# Patient Record
Sex: MALE | Race: BLACK OR AFRICAN AMERICAN | NOT HISPANIC OR LATINO | Employment: UNEMPLOYED | URBAN - METROPOLITAN AREA
[De-identification: names, ages, dates, MRNs, and addresses within clinical notes are randomized per-mention and may not be internally consistent; named-entity substitution may affect disease eponyms.]

---

## 2017-07-09 ENCOUNTER — APPOINTMENT (EMERGENCY)
Dept: RADIOLOGY | Facility: HOSPITAL | Age: 52
End: 2017-07-09
Payer: COMMERCIAL

## 2017-07-09 ENCOUNTER — HOSPITAL ENCOUNTER (EMERGENCY)
Facility: HOSPITAL | Age: 52
Discharge: HOME | End: 2017-07-09
Attending: EMERGENCY MEDICINE | Admitting: EMERGENCY MEDICINE
Payer: COMMERCIAL

## 2017-07-09 VITALS
HEART RATE: 69 BPM | BODY MASS INDEX: 29.01 KG/M2 | TEMPERATURE: 97.8 F | DIASTOLIC BLOOD PRESSURE: 70 MMHG | OXYGEN SATURATION: 95 % | SYSTOLIC BLOOD PRESSURE: 126 MMHG | RESPIRATION RATE: 18 BRPM | WEIGHT: 208 LBS

## 2017-07-09 DIAGNOSIS — K85.90 PANCREATITIS, ACUTE: Primary | ICD-10-CM

## 2017-07-09 DIAGNOSIS — K59.00 CONSTIPATION: ICD-10-CM

## 2017-07-09 DIAGNOSIS — R10.9 ABDOMINAL PAIN: ICD-10-CM

## 2017-07-09 LAB
ALBUMIN SERPL BCP-MCNC: 3.8 G/DL (ref 3.5–5)
ALP SERPL-CCNC: 89 U/L (ref 46–116)
ALT SERPL W P-5'-P-CCNC: 28 U/L (ref 12–78)
ANION GAP SERPL CALCULATED.3IONS-SCNC: 10 MMOL/L (ref 4–13)
AST SERPL W P-5'-P-CCNC: 18 U/L (ref 5–45)
ATRIAL RATE: 72 BPM
BASOPHILS # BLD AUTO: 0.01 THOUSANDS/ΜL (ref 0–0.1)
BASOPHILS NFR BLD AUTO: 0 % (ref 0–1)
BILIRUB SERPL-MCNC: 0.95 MG/DL (ref 0.2–1)
BUN SERPL-MCNC: 12 MG/DL (ref 5–25)
CALCIUM SERPL-MCNC: 9.5 MG/DL (ref 8.3–10.1)
CHLORIDE SERPL-SCNC: 99 MMOL/L (ref 100–108)
CO2 SERPL-SCNC: 25 MMOL/L (ref 21–32)
CREAT SERPL-MCNC: 0.98 MG/DL (ref 0.6–1.3)
EOSINOPHIL # BLD AUTO: 0.18 THOUSAND/ΜL (ref 0–0.61)
EOSINOPHIL NFR BLD AUTO: 2 % (ref 0–6)
ERYTHROCYTE [DISTWIDTH] IN BLOOD BY AUTOMATED COUNT: 13.4 % (ref 11.6–15.1)
GFR SERPL CREATININE-BSD FRML MDRD: >60 ML/MIN/1.73SQ M
GLUCOSE SERPL-MCNC: 186 MG/DL (ref 65–140)
HCT VFR BLD AUTO: 41.9 % (ref 36.5–49.3)
HGB BLD-MCNC: 14.3 G/DL (ref 12–17)
LIPASE SERPL-CCNC: 455 U/L (ref 73–393)
LYMPHOCYTES # BLD AUTO: 2.43 THOUSANDS/ΜL (ref 0.6–4.47)
LYMPHOCYTES NFR BLD AUTO: 28 % (ref 14–44)
MCH RBC QN AUTO: 29.6 PG (ref 26.8–34.3)
MCHC RBC AUTO-ENTMCNC: 34.1 G/DL (ref 31.4–37.4)
MCV RBC AUTO: 87 FL (ref 82–98)
MONOCYTES # BLD AUTO: 0.6 THOUSAND/ΜL (ref 0.17–1.22)
MONOCYTES NFR BLD AUTO: 7 % (ref 4–12)
NEUTROPHILS # BLD AUTO: 5.56 THOUSANDS/ΜL (ref 1.85–7.62)
NEUTS SEG NFR BLD AUTO: 63 % (ref 43–75)
NRBC BLD AUTO-RTO: 0 /100 WBCS
P AXIS: 73 DEGREES
PLATELET # BLD AUTO: 275 THOUSANDS/UL (ref 149–390)
PMV BLD AUTO: 9.4 FL (ref 8.9–12.7)
POTASSIUM SERPL-SCNC: 3.9 MMOL/L (ref 3.5–5.3)
PR INTERVAL: 176 MS
PROT SERPL-MCNC: 8.5 G/DL (ref 6.4–8.2)
QRS AXIS: -56 DEGREES
QRSD INTERVAL: 90 MS
QT INTERVAL: 366 MS
QTC INTERVAL: 400 MS
RBC # BLD AUTO: 4.83 MILLION/UL (ref 3.88–5.62)
SODIUM SERPL-SCNC: 134 MMOL/L (ref 136–145)
T WAVE AXIS: 71 DEGREES
VENTRICULAR RATE: 72 BPM
WBC # BLD AUTO: 8.79 THOUSAND/UL (ref 4.31–10.16)

## 2017-07-09 PROCEDURE — 96374 THER/PROPH/DIAG INJ IV PUSH: CPT

## 2017-07-09 PROCEDURE — 93005 ELECTROCARDIOGRAM TRACING: CPT

## 2017-07-09 PROCEDURE — 36415 COLL VENOUS BLD VENIPUNCTURE: CPT

## 2017-07-09 PROCEDURE — 74000 HB X-RAY EXAM OF ABDOMEN (SINGLE ANTEROPOSTERIOR VIEW): CPT

## 2017-07-09 PROCEDURE — 83690 ASSAY OF LIPASE: CPT | Performed by: EMERGENCY MEDICINE

## 2017-07-09 PROCEDURE — 99284 EMERGENCY DEPT VISIT MOD MDM: CPT

## 2017-07-09 PROCEDURE — 80053 COMPREHEN METABOLIC PANEL: CPT | Performed by: EMERGENCY MEDICINE

## 2017-07-09 PROCEDURE — 85025 COMPLETE CBC W/AUTO DIFF WBC: CPT | Performed by: EMERGENCY MEDICINE

## 2017-07-09 RX ORDER — POLYETHYLENE GLYCOL 3350 17 G/17G
17 POWDER, FOR SOLUTION ORAL 2 TIMES DAILY
Qty: 14 EACH | Refills: 0 | Status: SHIPPED | OUTPATIENT
Start: 2017-07-09 | End: 2017-12-08

## 2017-07-09 RX ORDER — POLYETHYLENE GLYCOL 3350 17 G/17G
17 POWDER, FOR SOLUTION ORAL ONCE
Status: COMPLETED | OUTPATIENT
Start: 2017-07-09 | End: 2017-07-09

## 2017-07-09 RX ORDER — SENNA AND DOCUSATE SODIUM 50; 8.6 MG/1; MG/1
1 TABLET, FILM COATED ORAL DAILY
Qty: 14 TABLET | Refills: 0 | Status: SHIPPED | OUTPATIENT
Start: 2017-07-09 | End: 2017-12-08

## 2017-07-09 RX ORDER — MORPHINE SULFATE 2 MG/ML
2 INJECTION, SOLUTION INTRAMUSCULAR; INTRAVENOUS ONCE
Status: COMPLETED | OUTPATIENT
Start: 2017-07-09 | End: 2017-07-09

## 2017-07-09 RX ADMIN — MORPHINE SULFATE 2 MG: 2 INJECTION, SOLUTION INTRAMUSCULAR; INTRAVENOUS at 12:45

## 2017-07-09 RX ADMIN — POLYETHYLENE GLYCOL 3350 17 G: 17 POWDER, FOR SOLUTION ORAL at 13:20

## 2017-07-10 ENCOUNTER — APPOINTMENT (EMERGENCY)
Dept: RADIOLOGY | Facility: HOSPITAL | Age: 52
End: 2017-07-10
Payer: COMMERCIAL

## 2017-07-10 ENCOUNTER — HOSPITAL ENCOUNTER (EMERGENCY)
Facility: HOSPITAL | Age: 52
Discharge: HOME | End: 2017-07-10
Attending: EMERGENCY MEDICINE
Payer: COMMERCIAL

## 2017-07-10 VITALS
WEIGHT: 208 LBS | TEMPERATURE: 98.3 F | HEART RATE: 68 BPM | DIASTOLIC BLOOD PRESSURE: 72 MMHG | OXYGEN SATURATION: 95 % | HEIGHT: 71 IN | BODY MASS INDEX: 29.12 KG/M2 | RESPIRATION RATE: 18 BRPM | SYSTOLIC BLOOD PRESSURE: 160 MMHG

## 2017-07-10 DIAGNOSIS — K85.90 PANCREATITIS: Primary | ICD-10-CM

## 2017-07-10 LAB
ALBUMIN SERPL BCP-MCNC: 3.8 G/DL (ref 3.5–5)
ALP SERPL-CCNC: 88 U/L (ref 46–116)
ALT SERPL W P-5'-P-CCNC: 29 U/L (ref 12–78)
ANION GAP SERPL CALCULATED.3IONS-SCNC: 9 MMOL/L (ref 4–13)
AST SERPL W P-5'-P-CCNC: 20 U/L (ref 5–45)
BASOPHILS # BLD AUTO: 0.02 THOUSANDS/ΜL (ref 0–0.1)
BASOPHILS NFR BLD AUTO: 0 % (ref 0–1)
BILIRUB SERPL-MCNC: 0.92 MG/DL (ref 0.2–1)
BUN SERPL-MCNC: 14 MG/DL (ref 5–25)
CALCIUM SERPL-MCNC: 9.4 MG/DL (ref 8.3–10.1)
CHLORIDE SERPL-SCNC: 101 MMOL/L (ref 100–108)
CO2 SERPL-SCNC: 27 MMOL/L (ref 21–32)
CREAT SERPL-MCNC: 0.91 MG/DL (ref 0.6–1.3)
EOSINOPHIL # BLD AUTO: 0.23 THOUSAND/ΜL (ref 0–0.61)
EOSINOPHIL NFR BLD AUTO: 3 % (ref 0–6)
ERYTHROCYTE [DISTWIDTH] IN BLOOD BY AUTOMATED COUNT: 13.5 % (ref 11.6–15.1)
GFR SERPL CREATININE-BSD FRML MDRD: >60 ML/MIN/1.73SQ M
GLUCOSE SERPL-MCNC: 132 MG/DL (ref 65–140)
HCT VFR BLD AUTO: 42.8 % (ref 36.5–49.3)
HGB BLD-MCNC: 14.7 G/DL (ref 12–17)
LIPASE SERPL-CCNC: 296 U/L (ref 73–393)
LYMPHOCYTES # BLD AUTO: 3.52 THOUSANDS/ΜL (ref 0.6–4.47)
LYMPHOCYTES NFR BLD AUTO: 41 % (ref 14–44)
MCH RBC QN AUTO: 29.9 PG (ref 26.8–34.3)
MCHC RBC AUTO-ENTMCNC: 34.3 G/DL (ref 31.4–37.4)
MCV RBC AUTO: 87 FL (ref 82–98)
MONOCYTES # BLD AUTO: 0.74 THOUSAND/ΜL (ref 0.17–1.22)
MONOCYTES NFR BLD AUTO: 9 % (ref 4–12)
NEUTROPHILS # BLD AUTO: 4.01 THOUSANDS/ΜL (ref 1.85–7.62)
NEUTS SEG NFR BLD AUTO: 47 % (ref 43–75)
NRBC BLD AUTO-RTO: 0 /100 WBCS
PLATELET # BLD AUTO: 299 THOUSANDS/UL (ref 149–390)
PMV BLD AUTO: 9.4 FL (ref 8.9–12.7)
POTASSIUM SERPL-SCNC: 4.1 MMOL/L (ref 3.5–5.3)
PROT SERPL-MCNC: 8.4 G/DL (ref 6.4–8.2)
RBC # BLD AUTO: 4.92 MILLION/UL (ref 3.88–5.62)
SODIUM SERPL-SCNC: 137 MMOL/L (ref 136–145)
WBC # BLD AUTO: 8.53 THOUSAND/UL (ref 4.31–10.16)

## 2017-07-10 PROCEDURE — 83690 ASSAY OF LIPASE: CPT | Performed by: EMERGENCY MEDICINE

## 2017-07-10 PROCEDURE — 36415 COLL VENOUS BLD VENIPUNCTURE: CPT | Performed by: EMERGENCY MEDICINE

## 2017-07-10 PROCEDURE — 96374 THER/PROPH/DIAG INJ IV PUSH: CPT

## 2017-07-10 PROCEDURE — 99284 EMERGENCY DEPT VISIT MOD MDM: CPT

## 2017-07-10 PROCEDURE — 85025 COMPLETE CBC W/AUTO DIFF WBC: CPT | Performed by: EMERGENCY MEDICINE

## 2017-07-10 PROCEDURE — 96375 TX/PRO/DX INJ NEW DRUG ADDON: CPT

## 2017-07-10 PROCEDURE — 96361 HYDRATE IV INFUSION ADD-ON: CPT

## 2017-07-10 PROCEDURE — 80053 COMPREHEN METABOLIC PANEL: CPT | Performed by: EMERGENCY MEDICINE

## 2017-07-10 PROCEDURE — 74177 CT ABD & PELVIS W/CONTRAST: CPT

## 2017-07-10 RX ORDER — ONDANSETRON 4 MG/1
4 TABLET, ORALLY DISINTEGRATING ORAL EVERY 8 HOURS PRN
Qty: 12 TABLET | Refills: 0 | Status: SHIPPED | OUTPATIENT
Start: 2017-07-10 | End: 2017-12-08

## 2017-07-10 RX ORDER — ONDANSETRON 2 MG/ML
4 INJECTION INTRAMUSCULAR; INTRAVENOUS ONCE
Status: COMPLETED | OUTPATIENT
Start: 2017-07-10 | End: 2017-07-10

## 2017-07-10 RX ORDER — NAPROXEN 500 MG/1
500 TABLET ORAL 2 TIMES DAILY WITH MEALS
Qty: 28 TABLET | Refills: 0 | Status: SHIPPED | OUTPATIENT
Start: 2017-07-10 | End: 2017-12-08

## 2017-07-10 RX ORDER — LISINOPRIL 20 MG/1
20 TABLET ORAL DAILY
COMMUNITY

## 2017-07-10 RX ORDER — MAGNESIUM HYDROXIDE/ALUMINUM HYDROXICE/SIMETHICONE 120; 1200; 1200 MG/30ML; MG/30ML; MG/30ML
15 SUSPENSION ORAL ONCE
Status: COMPLETED | OUTPATIENT
Start: 2017-07-10 | End: 2017-07-10

## 2017-07-10 RX ORDER — KETOROLAC TROMETHAMINE 30 MG/ML
15 INJECTION, SOLUTION INTRAMUSCULAR; INTRAVENOUS ONCE
Status: COMPLETED | OUTPATIENT
Start: 2017-07-10 | End: 2017-07-10

## 2017-07-10 RX ADMIN — SODIUM CHLORIDE 1000 ML: 0.9 INJECTION, SOLUTION INTRAVENOUS at 15:59

## 2017-07-10 RX ADMIN — LIDOCAINE HYDROCHLORIDE 10 ML: 20 SOLUTION ORAL; TOPICAL at 17:03

## 2017-07-10 RX ADMIN — ALUMINUM HYDROXIDE, MAGNESIUM HYDROXIDE, AND SIMETHICONE 15 ML: 200; 200; 20 SUSPENSION ORAL at 17:03

## 2017-07-10 RX ADMIN — KETOROLAC TROMETHAMINE 15 MG: 30 INJECTION, SOLUTION INTRAMUSCULAR at 15:58

## 2017-07-10 RX ADMIN — ONDANSETRON 4 MG: 2 INJECTION INTRAMUSCULAR; INTRAVENOUS at 15:58

## 2017-07-10 RX ADMIN — IOHEXOL 100 ML: 350 INJECTION, SOLUTION INTRAVENOUS at 16:26

## 2017-09-25 ENCOUNTER — APPOINTMENT (EMERGENCY)
Dept: RADIOLOGY | Facility: HOSPITAL | Age: 52
End: 2017-09-25
Payer: COMMERCIAL

## 2017-09-25 ENCOUNTER — HOSPITAL ENCOUNTER (EMERGENCY)
Facility: HOSPITAL | Age: 52
Discharge: HOME | End: 2017-09-26
Attending: EMERGENCY MEDICINE | Admitting: EMERGENCY MEDICINE
Payer: COMMERCIAL

## 2017-09-25 VITALS
OXYGEN SATURATION: 98 % | HEART RATE: 73 BPM | RESPIRATION RATE: 19 BRPM | DIASTOLIC BLOOD PRESSURE: 93 MMHG | HEIGHT: 71 IN | TEMPERATURE: 98.8 F | WEIGHT: 202.82 LBS | SYSTOLIC BLOOD PRESSURE: 144 MMHG | BODY MASS INDEX: 28.4 KG/M2

## 2017-09-25 DIAGNOSIS — S20.219A RIB CONTUSION: Primary | ICD-10-CM

## 2017-09-25 PROCEDURE — 71020 HB CHEST X-RAY 2VW FRONTAL&LATL: CPT

## 2017-09-25 RX ORDER — IBUPROFEN 400 MG/1
800 TABLET ORAL ONCE
Status: DISCONTINUED | OUTPATIENT
Start: 2017-09-26 | End: 2017-09-26 | Stop reason: HOSPADM

## 2017-09-26 PROCEDURE — 99283 EMERGENCY DEPT VISIT LOW MDM: CPT

## 2017-09-26 RX ORDER — OXYCODONE HYDROCHLORIDE AND ACETAMINOPHEN 5; 325 MG/1; MG/1
1 TABLET ORAL EVERY 4 HOURS PRN
Qty: 20 TABLET | Refills: 0 | Status: SHIPPED | OUTPATIENT
Start: 2017-09-26 | End: 2017-09-29

## 2017-09-26 RX ORDER — OXYCODONE HYDROCHLORIDE AND ACETAMINOPHEN 5; 325 MG/1; MG/1
1 TABLET ORAL ONCE
Status: COMPLETED | OUTPATIENT
Start: 2017-09-26 | End: 2017-09-26

## 2017-09-26 RX ADMIN — OXYCODONE AND ACETAMINOPHEN 1 TABLET: 5; 325 TABLET ORAL at 01:04

## 2017-09-26 NOTE — ED PROVIDER NOTES
History  Chief Complaint   Patient presents with    Fall     Pt brought via EMS for evaluation of right rib pain after falling in the shower  Pt denies LOC, denies striking head, denies thinners  Patient presents to the emergency department via ambulance after accidentally falling in the bathroom striking his right lateral posterior ribs on the bathtub  He denies any other injury  He denies head or neck pain  He denies loss of consciousness  He states it hurts to a breeze secondary to the pain  He denies belly or lower back pain  Denies long bone injury  Prior to Admission Medications   Prescriptions Last Dose Informant Patient Reported? Taking?   lisinopril (ZESTRIL) 20 mg tablet   Yes No   Sig: Take 20 mg by mouth daily   metFORMIN (GLUCOPHAGE) 1000 MG tablet   Yes No   Sig: Take 1,000 mg by mouth 2 (two) times a day with meals  naproxen (NAPROSYN) 500 mg tablet   No No   Sig: Take 1 tablet by mouth 2 (two) times a day with meals for 14 days   ondansetron (ZOFRAN-ODT) 4 mg disintegrating tablet   No No   Sig: Take 1 tablet by mouth every 8 (eight) hours as needed for nausea for up to 4 days   polyethylene glycol (MIRALAX) 17 g packet   No No   Sig: Take 17 g by mouth 2 (two) times a day BID until you have BM  Then daily until you are back to your regular schedule   senna-docusate sodium (SENOKOT-S) 8 6-50 mg per tablet   No No   Sig: Take 1 tablet by mouth daily      Facility-Administered Medications: None       Past Medical History:   Diagnosis Date    Diabetes mellitus     Hyperlipidemia     Hypertension        Past Surgical History:   Procedure Laterality Date    APPENDECTOMY         History reviewed  No pertinent family history  I have reviewed and agree with the history as documented      Social History   Substance Use Topics    Smoking status: Current Every Day Smoker     Packs/day: 1 00     Types: Cigarettes    Smokeless tobacco: Never Used    Alcohol use No        Review of Systems   Constitutional: Negative  Negative for activity change, appetite change, chills, diaphoresis, fatigue, fever and unexpected weight change  HENT: Negative  Eyes: Negative  Negative for photophobia and visual disturbance  Respiratory: Positive for shortness of breath  Negative for cough, chest tightness, wheezing and stridor  Cardiovascular: Positive for chest pain  Negative for palpitations and leg swelling  Gastrointestinal: Negative  Negative for abdominal pain, nausea and vomiting  Endocrine: Negative  Genitourinary: Negative  Musculoskeletal: Negative  Negative for back pain, neck pain and neck stiffness  Skin: Negative  Negative for wound  Allergic/Immunologic: Negative  Neurological: Negative  Negative for dizziness, syncope and headaches  Hematological: Negative  Does not bruise/bleed easily  Psychiatric/Behavioral: Negative  Physical Exam  ED Triage Vitals [09/25/17 2349]   Temperature Pulse Respirations Blood Pressure SpO2   98 8 °F (37 1 °C) 73 19 144/93 98 %      Temp Source Heart Rate Source Patient Position - Orthostatic VS BP Location FiO2 (%)   Oral Monitor Lying Right arm --      Pain Score       --           Physical Exam   Constitutional: He is oriented to person, place, and time  He appears well-developed and well-nourished  Nontoxic appearance  No respiratory distress  Mildly uncomfortable in appearance while lying supine and still  HENT:   Head: Normocephalic and atraumatic  Right Ear: External ear normal    Left Ear: External ear normal    Mouth/Throat: Oropharynx is clear and moist    No visible evidence of head or scalp trauma  Eyes: Conjunctivae and EOM are normal  Pupils are equal, round, and reactive to light  Neck: Normal range of motion  Neck supple  Cardiovascular: Normal rate, regular rhythm, normal heart sounds and intact distal pulses      Pulmonary/Chest: Effort normal and breath sounds normal  No respiratory distress  He has no wheezes  He has no rales  He exhibits tenderness  Tenderness to the right lateral rib area  No visible bruising ecchymosis subcutaneous air or crepitance  Abdominal: Soft  Bowel sounds are normal  He exhibits no distension and no mass  There is no tenderness  There is no rebound and no guarding  No hernia  Musculoskeletal: Normal range of motion  He exhibits no edema, tenderness or deformity  Neurological: He is alert and oriented to person, place, and time  He has normal reflexes  He displays normal reflexes  No cranial nerve deficit or sensory deficit  He exhibits normal muscle tone  Coordination normal    Skin: Skin is warm and dry  No rash noted  No erythema  No pallor  Psychiatric: He has a normal mood and affect  His behavior is normal  Judgment and thought content normal    Nursing note and vitals reviewed  ED Medications  Medications   ibuprofen (MOTRIN) tablet 800 mg (not administered)   oxyCODONE-acetaminophen (PERCOCET) 5-325 mg per tablet 1 tablet (not administered)       Diagnostic Studies  Labs Reviewed - No data to display    XR chest 2 views   ED Interpretation   NAD  No PTX or obvious rib fractures          Procedures  Procedures      Phone Contacts  ED Phone Contact    ED Course  ED Course as of Sep 26 0026   Tue Sep 26, 2017   0020 Patient is stable for discharge  His chest x-ray is unremarkable and shows no evidence of rib fractures or pneumothorax  Percocet was given and he will be given a script for a few days  I discussed signs and symptoms that would require the patient to return to the emergency department  [facility]  CritCare Time    Disposition  Final diagnoses:   Rib contusion     ED Disposition     ED Disposition Condition Comment    Discharge  Nahid Robertson discharge to home/self care      Condition at discharge: Stable        Follow-up Information     Follow up With Specialties Details Why [facility] physician Schedule an appointment as soon as possible for a visit          Patient's Medications   Discharge Prescriptions    OXYCODONE-ACETAMINOPHEN (PERCOCET) 5-325 MG PER TABLET    Take 1 tablet by mouth every 4 (four) hours as needed for moderate pain for up to 3 days Max Daily Amount: 6 tablets       Start Date: 9/26/2017 End Date: 9/29/2017       Order Dose: 1 tablet       Quantity: 20 tablet    Refills: 0     No discharge procedures on file      ED Provider  Electronically Signed by       Aydee Gooden MD  09/26/17 7814

## 2017-09-26 NOTE — ED NOTES
Discharge instructions and medications reviewed with pt  Pt verbalized understanding, with no further questions at this time       Karmen Figueredo, ASHLIE  09/26/17 7705

## 2017-09-26 NOTE — DISCHARGE INSTRUCTIONS
Contusion in Adults   WHAT YOU NEED TO KNOW:   What is a contusion? A contusion is a bruise that appears on your skin after an injury  A bruise happens when small blood vessels tear but skin does not  When blood vessels tear, blood leaks into nearby tissue, such as soft tissue or muscle  What causes a contusion? A hard object or a strained muscle can leave a bruise on your skin  A twisted knee or ankle can cause a bone bruise  You may get a bruise near an area where you have had blood taken for medical tests  What increases my risk for a contusion? · A bleeding disorder that makes you bleed more easily    · Kidney or liver disease, or an infection    · A family history of bleeding problems    · Medicines such as blood thinners or certain over-the-counter medicines and herbal medicines    · Weakened skin and muscles from older age or poor nutrition  What other signs and symptoms may I have with a contusion? · Pain that increases when you touch the bruise, walk, or use the area around the bruise    · Swelling or a lump at the site of the bruise or near it    · Red, blue, or black skin that may change to green or yellow after a few days           · Stiffness or problems moving the bruised area of your body  How is a contusion diagnosed? Your healthcare provider may ask about any injuries, infections, or bleeding problems you had in the past  He or she will check the skin over the injured area  He or she may touch it to see where it hurts  He or she may also check for problems you may have when you move your bruised area  You may need any of the following tests:  · Blood tests  may be used to check for blood disorders or to see how long it takes for your blood to clot  · Ultrasound  pictures may show how deep the bruise is and if any of your organs, such as your liver, are injured  · MRI  pictures may show if a hematoma (pooling of blood) has started to form   You may be given contrast liquid to help the pictures show up better  Tell the healthcare provider if you have ever had an allergic reaction to contrast liquid  Do not enter the MRI room with anything metal  Metal can cause serious injury  Tell the healthcare provider if you have any metal in or on your body  How is a contusion treated? Your bruise may heal without any treatment  Treatment depends on the part of your body that is injured, and how serious your injury is  You may need any of the following:  · NSAIDs , such as ibuprofen, help decrease swelling, pain, and fever  This medicine is available with or without a doctor's order  NSAIDs can cause stomach bleeding or kidney problems in certain people  If you take blood thinner medicine, always ask your healthcare provider if NSAIDs are safe for you  Always read the medicine label and follow directions  · Prescription pain medicine  may be given  Do not wait until the pain is severe before you take your medicine  · Aspiration  is a procedure to drain pooled blood in your muscle  This prevents increased pressure in the muscle  · Surgery  may be done to repair a tear in the muscle or relieve pressure in the muscle caused by swelling  What can I do to help my contusion heal?   · Rest the injured area  or use it less than usual  If you bruised your leg or foot, you may need crutches or a cane to help you walk  This will help you keep weight off your injured body part  · Apply ice  to decrease swelling and pain  Ice may also help prevent tissue damage  Use an ice pack, or put crushed ice in a plastic bag  Cover it with a towel and place it on your bruise for 15 to 20 minutes every hour or as directed  · Use compression  to support the area and decrease swelling  Wrap an elastic bandage around the area over the bruised muscle  Make sure the bandage is not too tight  You should be able to fit 1 finger between the bandage and your skin      · Elevate (raise) your injured body part  above the level of your heart to help decrease pain and swelling  Use pillows, blankets, or rolled towels to elevate the area as often as you can  · Do not drink alcohol  as directed  Alcohol may slow healing  · Do not stretch injured muscles  right after your injury  Ask your healthcare provider when and how you may safely stretch after your injury  Gentle stretches can help increase your flexibility  · Do not massage the area or put heating pads  on the bruise right after your injury  Heat and massage may slow healing  Your healthcare provider may tell you to apply heat after several days  At that time, heat will start to help the injury heal   How can I prevent a contusion? · Stretch and warm up before you play sports or exercise  · Wear protective gear when you play sports  Examples are shin guards and padding  · If you begin a new physical activity, start slowly to give your body a chance to adjust   When should I seek immediate care? · You have new trouble moving the injured area  · You have tingling or numbness in or near the injured area  · Your hand or foot below the bruise gets cold or turns pale  When should I contact my healthcare provider? · You find a new lump in the injured area  · Your symptoms do not improve with treatment after 4 to 5 days  · You have questions or concerns about your condition or care  CARE AGREEMENT:   You have the right to help plan your care  Learn about your health condition and how it may be treated  Discuss treatment options with your caregivers to decide what care you want to receive  You always have the right to refuse treatment  The above information is an  only  It is not intended as medical advice for individual conditions or treatments  Talk to your doctor, nurse or pharmacist before following any medical regimen to see if it is safe and effective for you    © 2017 [employer] Information is for End User's use only and may not be sold, redistributed or otherwise used for commercial purposes  All illustrations and images included in CareNotes® are the copyrighted property of [employer]  or [employer]  Rib Contusion   WHAT YOU NEED TO KNOW:   A rib contusion is a bruise on one or more of your ribs  DISCHARGE INSTRUCTIONS:   Return to the emergency department if:   · You have increased chest pain  · You have shortness of breath  · You start to cough up blood  · Your pain does not improve with pain medicine  Contact your healthcare provider if:   · You have a cough  · You have a fever  · You have questions or concerns about your condition or care  Medicines: You may need any of the following:  · NSAIDs , such as ibuprofen, help decrease swelling, pain, and fever  This medicine is available with or without a doctor's order  NSAIDs can cause stomach bleeding or kidney problems in certain people  If you take blood thinner medicine, always ask if NSAIDs are safe for you  Always read the medicine label and follow directions  Do not give these medicines to children under 10months of age without direction from your child's healthcare provider  · Prescription pain medicine  may be given  Ask how to take this medicine safely  · Take your medicine as directed  Contact your healthcare provider if you think your medicine is not helping or if you have side effects  Tell him of her if you are allergic to any medicine  Keep a list of the medicines, vitamins, and herbs you take  Include the amounts, and when and why you take them  Bring the list or the pill bottles to follow-up visits  Carry your medicine list with you in case of an emergency  Deep breathing:   · To help prevent pneumonia, take 10 deep breaths every hour, even when you wake up during the night  Brace your ribs with your hands or a pillow while you take deep breaths or cough  This will help decrease your pain      · You may need to use an incentive spirometer to help you take deeper breaths  Put the plastic piece into your mouth and take a very deep breath  Hold your breath as long as you can  Then let out your breath  Do this 10 times in a row every hour while you are awake  Rest:  Rest your ribs to decrease swelling and allow the injury to heal faster  Avoid activities that may cause more pain or damage to your ribs  As your pain decreases, begin movements slowly  Ice:  Ice helps decrease swelling and pain  Ice may also help prevent tissue damage  Use an ice pack or put crushed ice in a plastic bag  Cover it with a towel and place it on your bruised area for 15 to 20 minutes every hour as directed  Follow up with your healthcare provider as directed:  Write down your questions so you remember to ask them during your visits  © 2017 [address] [employer] Information is for End User's use only and may not be sold, redistributed or otherwise used for commercial purposes  All illustrations and images included in CareNotes® are the copyrighted property of [employer]  or [address]  The above information is an  only  It is not intended as medical advice for individual conditions or treatments  Talk to your doctor, nurse or pharmacist before following any medical regimen to see if it is safe and effective for you

## 2017-11-26 ENCOUNTER — APPOINTMENT (EMERGENCY)
Dept: CT IMAGING | Facility: HOSPITAL | Age: 52
End: 2017-11-26
Payer: COMMERCIAL

## 2017-11-26 ENCOUNTER — HOSPITAL ENCOUNTER (EMERGENCY)
Facility: HOSPITAL | Age: 52
Discharge: HOME | End: 2017-11-26
Attending: EMERGENCY MEDICINE
Payer: COMMERCIAL

## 2017-11-26 ENCOUNTER — APPOINTMENT (EMERGENCY)
Dept: RADIOLOGY | Facility: HOSPITAL | Age: 52
End: 2017-11-26
Payer: COMMERCIAL

## 2017-11-26 VITALS
OXYGEN SATURATION: 95 % | DIASTOLIC BLOOD PRESSURE: 81 MMHG | HEART RATE: 96 BPM | RESPIRATION RATE: 18 BRPM | BODY MASS INDEX: 29.01 KG/M2 | WEIGHT: 208 LBS | SYSTOLIC BLOOD PRESSURE: 168 MMHG | TEMPERATURE: 97.5 F

## 2017-11-26 DIAGNOSIS — R10.9 ABDOMINAL PAIN: Primary | ICD-10-CM

## 2017-11-26 LAB
ALBUMIN SERPL BCP-MCNC: 3.4 G/DL (ref 3.5–5)
ALP SERPL-CCNC: 86 U/L (ref 46–116)
ALT SERPL W P-5'-P-CCNC: 30 U/L (ref 12–78)
ANION GAP SERPL CALCULATED.3IONS-SCNC: 10 MMOL/L (ref 4–13)
APTT PPP: 27 SECONDS (ref 23–35)
AST SERPL W P-5'-P-CCNC: 26 U/L (ref 5–45)
ATRIAL RATE: 85 BPM
BACTERIA UR QL AUTO: ABNORMAL /HPF
BASOPHILS # BLD AUTO: 0.02 THOUSANDS/ΜL (ref 0–0.1)
BASOPHILS NFR BLD AUTO: 0 % (ref 0–1)
BILIRUB SERPL-MCNC: 0.5 MG/DL (ref 0.2–1)
BILIRUB UR QL STRIP: NEGATIVE
BUN SERPL-MCNC: 21 MG/DL (ref 5–25)
CALCIUM SERPL-MCNC: 8.8 MG/DL (ref 8.3–10.1)
CHLORIDE SERPL-SCNC: 101 MMOL/L (ref 100–108)
CLARITY UR: CLEAR
CO2 SERPL-SCNC: 25 MMOL/L (ref 21–32)
COLOR UR: COLORLESS
CREAT SERPL-MCNC: 1.2 MG/DL (ref 0.6–1.3)
EOSINOPHIL # BLD AUTO: 0.27 THOUSAND/ΜL (ref 0–0.61)
EOSINOPHIL NFR BLD AUTO: 3 % (ref 0–6)
ERYTHROCYTE [DISTWIDTH] IN BLOOD BY AUTOMATED COUNT: 13.3 % (ref 11.6–15.1)
GFR SERPL CREATININE-BSD FRML MDRD: 80 ML/MIN/1.73SQ M
GLUCOSE SERPL-MCNC: 336 MG/DL (ref 65–140)
GLUCOSE UR STRIP-MCNC: ABNORMAL MG/DL
HCT VFR BLD AUTO: 43.4 % (ref 36.5–49.3)
HGB BLD-MCNC: 14.8 G/DL (ref 12–17)
HGB UR QL STRIP.AUTO: NEGATIVE
INR PPP: 0.94 (ref 0.86–1.16)
KETONES UR STRIP-MCNC: NEGATIVE MG/DL
LEUKOCYTE ESTERASE UR QL STRIP: ABNORMAL
LIPASE SERPL-CCNC: 140 U/L (ref 73–393)
LYMPHOCYTES # BLD AUTO: 3.61 THOUSANDS/ΜL (ref 0.6–4.47)
LYMPHOCYTES NFR BLD AUTO: 44 % (ref 14–44)
MCH RBC QN AUTO: 29.1 PG (ref 26.8–34.3)
MCHC RBC AUTO-ENTMCNC: 34.1 G/DL (ref 31.4–37.4)
MCV RBC AUTO: 85 FL (ref 82–98)
MONOCYTES # BLD AUTO: 0.55 THOUSAND/ΜL (ref 0.17–1.22)
MONOCYTES NFR BLD AUTO: 7 % (ref 4–12)
NEUTROPHILS # BLD AUTO: 3.66 THOUSANDS/ΜL (ref 1.85–7.62)
NEUTS SEG NFR BLD AUTO: 45 % (ref 43–75)
NITRITE UR QL STRIP: NEGATIVE
NON-SQ EPI CELLS URNS QL MICRO: ABNORMAL /HPF
NRBC BLD AUTO-RTO: 0 /100 WBCS
P AXIS: 60 DEGREES
PH UR STRIP.AUTO: 6 [PH] (ref 4.5–8)
PLATELET # BLD AUTO: 274 THOUSANDS/UL (ref 149–390)
PMV BLD AUTO: 10 FL (ref 8.9–12.7)
POTASSIUM SERPL-SCNC: 5.3 MMOL/L (ref 3.5–5.3)
PR INTERVAL: 176 MS
PROT SERPL-MCNC: 7.6 G/DL (ref 6.4–8.2)
PROT UR STRIP-MCNC: NEGATIVE MG/DL
PROTHROMBIN TIME: 12.8 SECONDS (ref 12.1–14.4)
QRS AXIS: -5 DEGREES
QRSD INTERVAL: 96 MS
QT INTERVAL: 352 MS
QTC INTERVAL: 418 MS
RBC # BLD AUTO: 5.09 MILLION/UL (ref 3.88–5.62)
RBC #/AREA URNS AUTO: ABNORMAL /HPF
SODIUM SERPL-SCNC: 136 MMOL/L (ref 136–145)
SP GR UR STRIP.AUTO: 1.01 (ref 1–1.03)
T WAVE AXIS: 59 DEGREES
TROPONIN I SERPL-MCNC: <0.02 NG/ML
UROBILINOGEN UR QL STRIP.AUTO: 0.2 E.U./DL
VENTRICULAR RATE: 85 BPM
WBC # BLD AUTO: 8.13 THOUSAND/UL (ref 4.31–10.16)
WBC #/AREA URNS AUTO: ABNORMAL /HPF

## 2017-11-26 PROCEDURE — 99285 EMERGENCY DEPT VISIT HI MDM: CPT

## 2017-11-26 PROCEDURE — 74177 CT ABD & PELVIS W/CONTRAST: CPT

## 2017-11-26 PROCEDURE — 85025 COMPLETE CBC W/AUTO DIFF WBC: CPT | Performed by: EMERGENCY MEDICINE

## 2017-11-26 PROCEDURE — 81001 URINALYSIS AUTO W/SCOPE: CPT | Performed by: EMERGENCY MEDICINE

## 2017-11-26 PROCEDURE — 80053 COMPREHEN METABOLIC PANEL: CPT | Performed by: EMERGENCY MEDICINE

## 2017-11-26 PROCEDURE — 85730 THROMBOPLASTIN TIME PARTIAL: CPT | Performed by: EMERGENCY MEDICINE

## 2017-11-26 PROCEDURE — 96360 HYDRATION IV INFUSION INIT: CPT

## 2017-11-26 PROCEDURE — 96361 HYDRATE IV INFUSION ADD-ON: CPT

## 2017-11-26 PROCEDURE — 83690 ASSAY OF LIPASE: CPT | Performed by: EMERGENCY MEDICINE

## 2017-11-26 PROCEDURE — 93005 ELECTROCARDIOGRAM TRACING: CPT | Performed by: EMERGENCY MEDICINE

## 2017-11-26 PROCEDURE — 85610 PROTHROMBIN TIME: CPT | Performed by: EMERGENCY MEDICINE

## 2017-11-26 PROCEDURE — 71020 HB CHEST X-RAY 2VW FRONTAL&LATL: CPT

## 2017-11-26 PROCEDURE — 36415 COLL VENOUS BLD VENIPUNCTURE: CPT | Performed by: EMERGENCY MEDICINE

## 2017-11-26 PROCEDURE — 84484 ASSAY OF TROPONIN QUANT: CPT | Performed by: EMERGENCY MEDICINE

## 2017-11-26 RX ADMIN — SODIUM CHLORIDE 1000 ML: 0.9 INJECTION, SOLUTION INTRAVENOUS at 18:37

## 2017-11-26 RX ADMIN — IOHEXOL 100 ML: 350 INJECTION, SOLUTION INTRAVENOUS at 19:24

## 2017-11-26 NOTE — ED PROVIDER NOTES
History  Chief Complaint   Patient presents with    Abdominal Pain     patient is c/o abd pain, nausea and vomiting x 3 days  Patient with history htn, hld, diabetes who presents abdominal pain, middle for the past 3 days  Some nausea but no vomiting  No chest pain  Reports an episode of chest pain three weeks  No shortness of breath  Chronic mild lightheadedness  + diarrhea for the past 2 days  History of appendicitis and pancreatitis  Medical decision makin-year-old male with achy abdominal pain, nausea, vomiting, CT scans with no acute findings, discussed this with the patient, suspect viral syndrome causing the patient's symptoms, gave very strict return precautions  The patient (and any family present) verbalized understanding of the discharge instructions and warnings that would necessitate return to the Emergency Department  Gave verbal in addition to written discharge instructions  Specifically highlighted areas of special concern regarding the written and verbal discharge instructions and return precautions  All questions were answered prior to discharge  Prior to Admission Medications   Prescriptions Last Dose Informant Patient Reported? Taking?   lisinopril (ZESTRIL) 20 mg tablet   Yes No   Sig: Take 20 mg by mouth daily   metFORMIN (GLUCOPHAGE) 1000 MG tablet   Yes No   Sig: Take 1,000 mg by mouth 2 (two) times a day with meals     naproxen (NAPROSYN) 500 mg tablet   No No   Sig: Take 1 tablet by mouth 2 (two) times a day with meals for 14 days   ondansetron (ZOFRAN-ODT) 4 mg disintegrating tablet   No No   Sig: Take 1 tablet by mouth every 8 (eight) hours as needed for nausea for up to 4 days   polyethylene glycol (MIRALAX) 17 g packet   No No   Sig: Take 17 g by mouth 2 (two) times a day BID until you have BM  Then daily until you are back to your regular schedule   senna-docusate sodium (SENOKOT-S) 8 6-50 mg per tablet   No No   Sig: Take 1 tablet by mouth daily Facility-Administered Medications: None       Past Medical History:   Diagnosis Date    Diabetes mellitus ([facility] [address] )     Hyperlipidemia     Hypertension        Past Surgical History:   Procedure Laterality Date    APPENDECTOMY         History reviewed  No pertinent family history  I have reviewed and agree with the history as documented  Social History   Substance Use Topics    Smoking status: Current Every Day Smoker     Packs/day: 0 50     Types: Cigarettes    Smokeless tobacco: Never Used    Alcohol use No        Review of Systems   Constitutional: Negative for chills and fever  HENT: Negative for ear pain and hearing loss  Respiratory: Negative for chest tightness and shortness of breath  Cardiovascular: Negative for chest pain and leg swelling  Gastrointestinal: Positive for abdominal pain and nausea  Negative for diarrhea  Genitourinary: Negative for dysuria and hematuria  Musculoskeletal: Negative for joint swelling and neck stiffness  Skin: Negative for rash  Neurological: Negative for seizures and headaches  Psychiatric/Behavioral: Negative for hallucinations and suicidal ideas  All other systems reviewed and are negative  Physical Exam  ED Triage Vitals [11/26/17 1806]   Temperature Pulse Respirations Blood Pressure SpO2   97 5 °F (36 4 °C) 96 18 168/81 95 %      Temp Source Heart Rate Source Patient Position - Orthostatic VS BP Location FiO2 (%)   Temporal Monitor Sitting Right arm --      Pain Score       6           Orthostatic Vital Signs  Vitals:    11/26/17 1806   BP: 168/81   Pulse: 96   Patient Position - Orthostatic VS: Sitting       Physical Exam   Constitutional: He is oriented to person, place, and time  He appears well-developed and well-nourished  HENT:   Head: Normocephalic and atraumatic  Eyes: EOM are normal  Pupils are equal, round, and reactive to light  Neck: Normal range of motion  Neck supple     Cardiovascular: Normal rate, regular rhythm and normal heart sounds  No murmur heard  Pulmonary/Chest: Effort normal and breath sounds normal  No respiratory distress  He has no wheezes  Abdominal: Soft  Bowel sounds are normal  He exhibits no distension  There is no tenderness  Musculoskeletal: Normal range of motion  He exhibits no edema or tenderness  Neurological: He is alert and oriented to person, place, and time  No cranial nerve deficit  Coordination normal    Skin: Skin is warm and dry  He is not diaphoretic  No erythema  Psychiatric: He has a normal mood and affect  His behavior is normal    Nursing note and vitals reviewed  ED Medications  Medications   sodium chloride 0 9 % bolus 1,000 mL (0 mL Intravenous Stopped 11/26/17 2029)   iohexol (OMNIPAQUE) 350 MG/ML injection (MULTI-DOSE) 100 mL (100 mL Intravenous Given 11/26/17 1924)       Diagnostic Studies  Results Reviewed     Procedure Component Value Units Date/Time    Urine Microscopic [[id number]]  (Abnormal) Collected:  11/26/17 1851    Lab Status:  Final result Specimen:  Urine from Urine, Clean Catch Updated:  11/26/17 1905     RBC, UA None Seen /hpf      WBC, UA 2-4 (A) /hpf      Epithelial Cells None Seen /hpf      Bacteria, UA None Seen /hpf     Troponin I [[id number]]  (Normal) Collected:  11/26/17 1835    Lab Status:  Final result Specimen:  Blood from Arm, Left Updated:  11/26/17 1904     Troponin I <0 02 ng/mL     Narrative:         [employer] Chemistry analyzer 99% cutoff is > 0 04 ng/mL in network labs    o cTnI 99% cutoff is useful only when applied to patients in the clinical setting of myocardial ischemia  o cTnI 99% cutoff should be interpreted in the context of clinical history, ECG findings and possibly cardiac imaging to establish correct diagnosis  o cTnI 99% cutoff may be suggestive but clearly not indicative of a coronary event without the clinical setting of myocardial ischemia      Comprehensive metabolic panel [[id number]]  (Abnormal) Collected:  11/26/17 1835 Lab Status:  Final result Specimen:  Blood from Arm, Left Updated:  11/26/17 1903     Sodium 136 mmol/L      Potassium 5 3 mmol/L      Chloride 101 mmol/L      CO2 25 mmol/L      Anion Gap 10 mmol/L      BUN 21 mg/dL      Creatinine 1 20 mg/dL      Glucose 336 (H) mg/dL      Calcium 8 8 mg/dL      AST 26 U/L      ALT 30 U/L      Alkaline Phosphatase 86 U/L      Total Protein 7 6 g/dL      Albumin 3 4 (L) g/dL      Total Bilirubin 0 50 mg/dL      eGFR 80 ml/min/1 73sq m     Narrative:         National Kidney Disease Education Program recommendations are as follows:  GFR calculation is accurate only with a steady state creatinine  Chronic Kidney disease less than 60 ml/min/1 73 sq  meters  Kidney failure less than 15 ml/min/1 73 sq  meters  Lipase [[id number]]  (Normal) Collected:  11/26/17 1835    Lab Status:  Final result Specimen:  Blood from Arm, Left Updated:  11/26/17 1903     Lipase 140 u/L     UA w Reflex to Microscopic w Reflex to Culture [[id number]]  (Abnormal) Collected:  11/26/17 1851    Lab Status:  Final result Specimen:  Urine from Urine, Clean Catch Updated:  11/26/17 1859     Color, UA Colorless     Clarity, UA Clear     Specific Gravity, UA 1 010     pH, UA 6 0     Leukocytes, UA Elevated glucose may cause decreased leukocyte values  See urine microscopic for [facility] result/ (A)     Nitrite, UA Negative     Protein, UA Negative mg/dl      Glucose, UA >=1000 (1%) (A) mg/dl      Ketones, UA Negative mg/dl      Urobilinogen, UA 0 2 E U /dl      Bilirubin, UA Negative     Blood, UA Negative    Protime-INR [[id number]]  (Normal) Collected:  11/26/17 1835    Lab Status:  Final result Specimen:  Blood from Arm, Left Updated:  11/26/17 1857     Protime 12 8 seconds      INR 0 94    APTT [[id number]]  (Normal) Collected:  11/26/17 1835    Lab Status:  Final result Specimen:  Blood from Arm, Left Updated:  11/26/17 1857     PTT 27 seconds     Narrative:          Therapeutic Heparin Range = 60-90 seconds    CBC and differential [[id number]]  (Normal) Collected:  11/26/17 1835    Lab Status:  Final result Specimen:  Blood from Arm, Left Updated:  11/26/17 1844     WBC 8 13 Thousand/uL      RBC 5 09 Million/uL      Hemoglobin 14 8 g/dL      Hematocrit 43 4 %      MCV 85 fL      MCH 29 1 pg      MCHC 34 1 g/dL      RDW 13 3 %      MPV 10 0 fL      Platelets 653 Thousands/uL      nRBC 0 /100 WBCs      Neutrophils Relative 45 %      Lymphocytes Relative 44 %      Monocytes Relative 7 %      Eosinophils Relative 3 %      Basophils Relative 0 %      Neutrophils Absolute 3 66 Thousands/µL      Lymphocytes Absolute 3 61 Thousands/µL      Monocytes Absolute 0 55 Thousand/µL      Eosinophils Absolute 0 27 Thousand/µL      Basophils Absolute 0 02 Thousands/µL                  XR chest 2 views   ED Interpretation by Johana Fong MD (11/26 2002)   No active pulmonary disease, similar to prior CXR      CT abdomen pelvis with contrast   Final Result by Kumar Grewal MD (11/26 1941)      No evidence for an acute inflammatory process within the abdomen or pelvis  Healing right ninth and tenth ribs posteriorly  Workstation performed: [id number]                    Procedures  Procedures       Phone Contacts  ED Phone Contact    ED Course  ED Course as of Nov 26 2254   Sun Nov 26, 2017   1835 EKG rate of  [age]y-[age], sinus, no STEMI     1949 Impression       No evidence for an acute inflammatory process within the abdomen or pelvis  Healing right ninth and tenth ribs posteriorly                                      MDM  CritCare Time    Disposition  Final diagnoses:   Abdominal pain     Time reflects when diagnosis was documented in both MDM as applicable and the Disposition within this note     Time User Action Codes Description Comment    11/26/2017  8:02 PM Carla Cleary, [address] [R10 9] Abdominal pain       ED Disposition     ED Disposition Condition Comment    Discharge  Adron Clack discharge to home/self care     Condition at discharge: Good        Follow-up Information     Follow up With Specialties Details Why Contact Info    Primary Care Doctor  In 1 day          Discharge Medication List as of 11/26/2017  8:03 PM      CONTINUE these medications which have NOT CHANGED    Details   lisinopril (ZESTRIL) 20 mg tablet Take 20 mg by mouth daily, Historical Med      metFORMIN (GLUCOPHAGE) 1000 MG tablet Take 1,000 mg by mouth 2 (two) times a day with meals  , Until Discontinued, Historical Med      naproxen (NAPROSYN) 500 mg tablet Take 1 tablet by mouth 2 (two) times a day with meals for 14 days, Starting Mon 7/10/2017, Until Mon 7/24/2017, Print      ondansetron (ZOFRAN-ODT) 4 mg disintegrating tablet Take 1 tablet by mouth every 8 (eight) hours as needed for nausea for up to 4 days, Starting Mon 7/10/2017, Until Fri 7/14/2017, Print      polyethylene glycol (MIRALAX) 17 g packet Take 17 g by mouth 2 (two) times a day BID until you have BM  Then daily until you are back to your regular schedule, Starting Sun 7/9/2017, Print      senna-docusate sodium (SENOKOT-S) 8 6-50 mg per tablet Take 1 tablet by mouth daily, Starting Sun 7/9/2017, Print           No discharge procedures on file      ED Provider  Electronically Signed by           Bree Rubio MD  11/26/17 9384

## 2017-11-27 NOTE — ED NOTES
D/c instructions and rx reviewed w/ pt  All questions and concerns addressed at this time         Jake Marks RN  11/26/17 2030

## 2017-11-27 NOTE — DISCHARGE INSTRUCTIONS
Abdominal Pain   WHAT YOU NEED TO KNOW:   Abdominal pain can be dull, achy, or sharp  You may have pain in one area of your abdomen, or in your entire abdomen  Your pain may be caused by a condition such as constipation, food sensitivity or poisoning, infection, or a blockage  Abdominal pain can also be from a hernia, appendicitis, or an ulcer  Liver, gallbladder, or kidney conditions can also cause abdominal pain  The cause of your abdominal pain may be unknown  DISCHARGE INSTRUCTIONS:   Return to the emergency department if:   · You have new chest pain or shortness of breath  · You have pulsing pain in your upper abdomen or lower back that suddenly becomes constant  · Your pain is in the right lower abdominal area and worsens with movement  · You have a fever over 100 4°F (38°C) or shaking chills  · You are vomiting and cannot keep food or liquids down  · Your pain does not improve or gets worse over the next 8 to 12 hours  · You see blood in your vomit or bowel movements, or they look black and tarry  · Your skin or the whites of your eyes turn yellow  · You are a woman and have a large amount of vaginal bleeding that is not your monthly period  Contact your healthcare provider if:   · You have pain in your lower back  · You are a man and have pain in your testicles  · You have pain when you urinate  · You have questions or concerns about your condition or care  Follow up with your healthcare provider within 24 hours or as directed:  Write down your questions so you remember to ask them during your visits  Medicines:   · Medicines  may be given to calm your stomach and prevent vomiting or to decrease pain  Ask how to take pain medicine safely  · Take your medicine as directed  Contact your healthcare provider if you think your medicine is not helping or if you have side effects  Tell him of her if you are allergic to any medicine   Keep a list of the medicines, vitamins, and herbs you take  Include the amounts, and when and why you take them  Bring the list or the pill bottles to follow-up visits  Carry your medicine list with you in case of an emergency  © 2017 [address] [employer] Information is for End User's use only and may not be sold, redistributed or otherwise used for commercial purposes  All illustrations and images included in CareNotes® are the copyrighted property of [employer]  or [employer]  The above information is an  only  It is not intended as medical advice for individual conditions or treatments  Talk to your doctor, nurse or pharmacist before following any medical regimen to see if it is safe and effective for you

## 2017-12-08 ENCOUNTER — HOSPITAL ENCOUNTER (OUTPATIENT)
Facility: HOSPITAL | Age: 52
Setting detail: OBSERVATION
Discharge: HOME | End: 2017-12-11
Attending: EMERGENCY MEDICINE | Admitting: INTERNAL MEDICINE
Payer: COMMERCIAL

## 2017-12-08 ENCOUNTER — APPOINTMENT (EMERGENCY)
Dept: RADIOLOGY | Facility: HOSPITAL | Age: 52
End: 2017-12-08
Payer: COMMERCIAL

## 2017-12-08 DIAGNOSIS — R07.9 CHEST PAIN: Primary | ICD-10-CM

## 2017-12-08 PROBLEM — N17.9 AKI (ACUTE KIDNEY INJURY) (HCC): Status: ACTIVE | Noted: 2017-12-08

## 2017-12-08 PROBLEM — Z72.0 TOBACCO ABUSE: Chronic | Status: ACTIVE | Noted: 2017-12-08

## 2017-12-08 PROBLEM — E11.9 TYPE 2 DIABETES MELLITUS (HCC): Chronic | Status: ACTIVE | Noted: 2017-12-08

## 2017-12-08 PROBLEM — J45.909 ASTHMA: Chronic | Status: ACTIVE | Noted: 2017-12-08

## 2017-12-08 PROBLEM — E78.5 HYPERLIPIDEMIA: Chronic | Status: ACTIVE | Noted: 2017-12-08

## 2017-12-08 PROBLEM — I10 HYPERTENSION: Chronic | Status: ACTIVE | Noted: 2017-12-08

## 2017-12-08 LAB
ALBUMIN SERPL BCP-MCNC: 3.5 G/DL (ref 3.5–5)
ALP SERPL-CCNC: 86 U/L (ref 46–116)
ALT SERPL W P-5'-P-CCNC: 35 U/L (ref 12–78)
ANION GAP SERPL CALCULATED.3IONS-SCNC: 12 MMOL/L (ref 4–13)
AST SERPL W P-5'-P-CCNC: 16 U/L (ref 5–45)
BASOPHILS # BLD AUTO: 0.02 THOUSANDS/ΜL (ref 0–0.1)
BASOPHILS NFR BLD AUTO: 0 % (ref 0–1)
BILIRUB SERPL-MCNC: 0.4 MG/DL (ref 0.2–1)
BUN SERPL-MCNC: 16 MG/DL (ref 5–25)
CALCIUM SERPL-MCNC: 9 MG/DL (ref 8.3–10.1)
CHLORIDE SERPL-SCNC: 101 MMOL/L (ref 100–108)
CO2 SERPL-SCNC: 25 MMOL/L (ref 21–32)
CREAT SERPL-MCNC: 1.36 MG/DL (ref 0.6–1.3)
EOSINOPHIL # BLD AUTO: 0.27 THOUSAND/ΜL (ref 0–0.61)
EOSINOPHIL NFR BLD AUTO: 3 % (ref 0–6)
ERYTHROCYTE [DISTWIDTH] IN BLOOD BY AUTOMATED COUNT: 13.6 % (ref 11.6–15.1)
GFR SERPL CREATININE-BSD FRML MDRD: 69 ML/MIN/1.73SQ M
GLUCOSE SERPL-MCNC: 191 MG/DL (ref 65–140)
GLUCOSE SERPL-MCNC: 313 MG/DL (ref 65–140)
HCT VFR BLD AUTO: 42.8 % (ref 36.5–49.3)
HGB BLD-MCNC: 14.2 G/DL (ref 12–17)
LYMPHOCYTES # BLD AUTO: 3.5 THOUSANDS/ΜL (ref 0.6–4.47)
LYMPHOCYTES NFR BLD AUTO: 41 % (ref 14–44)
MCH RBC QN AUTO: 28.8 PG (ref 26.8–34.3)
MCHC RBC AUTO-ENTMCNC: 33.2 G/DL (ref 31.4–37.4)
MCV RBC AUTO: 87 FL (ref 82–98)
MONOCYTES # BLD AUTO: 0.69 THOUSAND/ΜL (ref 0.17–1.22)
MONOCYTES NFR BLD AUTO: 8 % (ref 4–12)
NEUTROPHILS # BLD AUTO: 3.98 THOUSANDS/ΜL (ref 1.85–7.62)
NEUTS SEG NFR BLD AUTO: 47 % (ref 43–75)
NRBC BLD AUTO-RTO: 0 /100 WBCS
NT-PROBNP SERPL-MCNC: 5 PG/ML
NT-PROBNP SERPL-MCNC: <5 PG/ML
PLATELET # BLD AUTO: 238 THOUSANDS/UL (ref 149–390)
PMV BLD AUTO: 9.9 FL (ref 8.9–12.7)
POTASSIUM SERPL-SCNC: 3.7 MMOL/L (ref 3.5–5.3)
PROT SERPL-MCNC: 7.4 G/DL (ref 6.4–8.2)
PROTHROMBIN TIME: 12.5 SECONDS (ref 12.1–14.4)
RBC # BLD AUTO: 4.93 MILLION/UL (ref 3.88–5.62)
SODIUM SERPL-SCNC: 138 MMOL/L (ref 136–145)
TROPONIN I SERPL-MCNC: <0.02 NG/ML
TROPONIN I SERPL-MCNC: <0.02 NG/ML
WBC # BLD AUTO: 8.47 THOUSAND/UL (ref 4.31–10.16)

## 2017-12-08 PROCEDURE — 80053 COMPREHEN METABOLIC PANEL: CPT | Performed by: EMERGENCY MEDICINE

## 2017-12-08 PROCEDURE — 94640 AIRWAY INHALATION TREATMENT: CPT

## 2017-12-08 PROCEDURE — 83880 ASSAY OF NATRIURETIC PEPTIDE: CPT | Performed by: EMERGENCY MEDICINE

## 2017-12-08 PROCEDURE — 71020 HB CHEST X-RAY 2VW FRONTAL&LATL: CPT

## 2017-12-08 PROCEDURE — 99285 EMERGENCY DEPT VISIT HI MDM: CPT

## 2017-12-08 PROCEDURE — 85611 PROTHROMBIN TEST: CPT | Performed by: EMERGENCY MEDICINE

## 2017-12-08 PROCEDURE — 84484 ASSAY OF TROPONIN QUANT: CPT | Performed by: EMERGENCY MEDICINE

## 2017-12-08 PROCEDURE — 84484 ASSAY OF TROPONIN QUANT: CPT | Performed by: PHYSICIAN ASSISTANT

## 2017-12-08 PROCEDURE — 83880 ASSAY OF NATRIURETIC PEPTIDE: CPT | Performed by: PHYSICIAN ASSISTANT

## 2017-12-08 PROCEDURE — 82948 REAGENT STRIP/BLOOD GLUCOSE: CPT

## 2017-12-08 PROCEDURE — 85025 COMPLETE CBC W/AUTO DIFF WBC: CPT | Performed by: EMERGENCY MEDICINE

## 2017-12-08 PROCEDURE — 93005 ELECTROCARDIOGRAM TRACING: CPT | Performed by: EMERGENCY MEDICINE

## 2017-12-08 PROCEDURE — 36415 COLL VENOUS BLD VENIPUNCTURE: CPT | Performed by: EMERGENCY MEDICINE

## 2017-12-08 RX ORDER — NICOTINE 21 MG/24HR
1 PATCH, TRANSDERMAL 24 HOURS TRANSDERMAL DAILY
Status: DISCONTINUED | OUTPATIENT
Start: 2017-12-09 | End: 2017-12-11 | Stop reason: HOSPADM

## 2017-12-08 RX ORDER — ATORVASTATIN CALCIUM 20 MG/1
20 TABLET, FILM COATED ORAL
Status: DISCONTINUED | OUTPATIENT
Start: 2017-12-09 | End: 2017-12-11 | Stop reason: HOSPADM

## 2017-12-08 RX ORDER — LISINOPRIL 20 MG/1
20 TABLET ORAL DAILY
Status: DISCONTINUED | OUTPATIENT
Start: 2017-12-09 | End: 2017-12-11 | Stop reason: HOSPADM

## 2017-12-08 RX ORDER — SODIUM CHLORIDE 9 MG/ML
125 INJECTION, SOLUTION INTRAVENOUS CONTINUOUS
Status: DISCONTINUED | OUTPATIENT
Start: 2017-12-08 | End: 2017-12-10

## 2017-12-08 RX ORDER — HEPARIN SODIUM 5000 [USP'U]/ML
5000 INJECTION, SOLUTION INTRAVENOUS; SUBCUTANEOUS EVERY 8 HOURS SCHEDULED
Status: DISCONTINUED | OUTPATIENT
Start: 2017-12-08 | End: 2017-12-11 | Stop reason: HOSPADM

## 2017-12-08 RX ORDER — ACETAMINOPHEN 325 MG/1
650 TABLET ORAL EVERY 4 HOURS PRN
Status: DISCONTINUED | OUTPATIENT
Start: 2017-12-08 | End: 2017-12-11 | Stop reason: HOSPADM

## 2017-12-08 RX ORDER — ATORVASTATIN CALCIUM 20 MG/1
20 TABLET, FILM COATED ORAL DAILY
COMMUNITY
End: 2019-04-22

## 2017-12-08 RX ADMIN — HEPARIN SODIUM 5000 UNITS: 5000 INJECTION, SOLUTION INTRAVENOUS; SUBCUTANEOUS at 21:33

## 2017-12-08 RX ADMIN — NITROGLYCERIN 1 INCH: 20 OINTMENT TOPICAL at 17:43

## 2017-12-08 RX ADMIN — SODIUM CHLORIDE 500 ML: 0.9 INJECTION, SOLUTION INTRAVENOUS at 18:13

## 2017-12-08 RX ADMIN — SODIUM CHLORIDE 125 ML/HR: 0.9 INJECTION, SOLUTION INTRAVENOUS at 20:42

## 2017-12-08 RX ADMIN — INSULIN LISPRO 2 UNITS: 100 INJECTION, SOLUTION INTRAVENOUS; SUBCUTANEOUS at 21:33

## 2017-12-08 RX ADMIN — IPRATROPIUM BROMIDE 0.5 MG: 0.5 SOLUTION RESPIRATORY (INHALATION) at 18:12

## 2017-12-08 RX ADMIN — INSULIN HUMAN 10 UNITS: 100 INJECTION, SOLUTION PARENTERAL at 18:17

## 2017-12-08 NOTE — ED PROVIDER NOTES
History  Chief Complaint   Patient presents with    Chest Pain     Patient presents today with a chief complaint of chest pain  Onset was 1 hour ago  Patient was getting dressed for work when he felt like "someone was sitting" on his chest  Patient also became cool and clammy  Patient had a similar episode yesterday  Patent also experienced nausea  Pain was 7/10     HPI  41-year-old [facility] [place] male presents with a chief complaint of chest pain  Patient states the pain started last night but went away and then when he was getting dressed for work today the pain came on again  Patient describes the chest pain as a heaviness in his chest that radiates down his arm and up his neck and into his back  Patient states he had some diaphoresis and some nausea coming with the chest pain  Patient was brought in by ambulance and had 2 sublingual nitroglycerines with relief of his pain from a 10 to a 4 and also 4 baby aspirin  Patient also admits to having a cold recently and a cough  Patient is a smoker and was counseled on trying to quit this nicotine use  Patient is a diabetic, has high blood pressure, and has high cholesterol  Prior to Admission Medications   Prescriptions Last Dose Informant Patient Reported? Taking?   atorvastatin (LIPITOR) 20 mg tablet   Yes Yes   Sig: Take 20 mg by mouth daily   lisinopril (ZESTRIL) 20 mg tablet   Yes No   Sig: Take 20 mg by mouth daily   metFORMIN (GLUCOPHAGE) 1000 MG tablet   Yes No   Sig: Take 1,000 mg by mouth 2 (two) times a day with meals  Facility-Administered Medications: None       Past Medical History:   Diagnosis Date    Asthma     Diabetes mellitus ([facility] [address] )     Hyperlipidemia     Hypertension        Past Surgical History:   Procedure Laterality Date    APPENDECTOMY      teenager        Family History   Problem Relation Age of Onset    Hypertension Mother     Hypertension Father      I have reviewed and agree with the history as documented      Social History   Substance Use Topics    Smoking status: Current Every Day Smoker     Packs/day: 1 00     Types: Cigarettes    Smokeless tobacco: Never Used    Alcohol use No        Review of Systems   Constitutional: Negative for chills and fever  HENT: Negative for congestion and rhinorrhea  Eyes: Negative for discharge and visual disturbance  Respiratory: Negative for shortness of breath and wheezing  Cardiovascular: Positive for chest pain  Negative for palpitations  Gastrointestinal: Positive for nausea  Negative for abdominal pain and vomiting  Endocrine: Negative for polydipsia and polyuria  Genitourinary: Negative for dysuria and hematuria  Musculoskeletal: Negative for arthralgias, gait problem and neck stiffness  Skin: Negative for rash and wound  Neurological: Positive for dizziness, weakness and numbness  Negative for headaches  Psychiatric/Behavioral: Negative for confusion and suicidal ideas  Physical Exam  ED Triage Vitals [12/08/17 1716]   Temperature Pulse Respirations Blood Pressure SpO2   98 °F (36 7 °C) (!) 109 18 132/82 98 %      Temp src Heart Rate Source Patient Position - Orthostatic VS BP Location FiO2 (%)   -- Monitor Sitting Right arm --      Pain Score       7           Orthostatic Vital Signs  Vitals:    12/08/17 1716 12/08/17 1813   BP: 132/82 129/81   Pulse: (!) 109 89   Patient Position - Orthostatic VS: Sitting        Physical Exam   Constitutional: He is oriented to person, place, and time  He appears well-developed and well-nourished  78-year-old  male sitting on the stretcher in mild distress with some chest heaviness  Patient describes the pain as a 4/10  HENT:   Head: Normocephalic and atraumatic  Mouth/Throat: Oropharynx is clear and moist    Eyes: EOM are normal  Pupils are equal, round, and reactive to light  Neck: Normal range of motion  Neck supple  Cardiovascular: Normal rate, regular rhythm and normal heart sounds  Pulmonary/Chest: Effort normal    Patient has coarse rhonchi bilaterally in inspiratory and expiratory breaths   Abdominal: Soft  Bowel sounds are normal  There is no tenderness  There is no rebound and no guarding  Musculoskeletal: Normal range of motion  Neurological: He is alert and oriented to person, place, and time  No cranial nerve deficit  He exhibits normal muscle tone  Coordination normal    Skin: Skin is warm and dry  Psychiatric: He has a normal mood and affect  Nursing note and vitals reviewed        ED Medications  Medications   sodium chloride 0 9 % bolus 500 mL (500 mL Intravenous New Bag 12/8/17 1813)   nitroglycerin (NITRO-BID) 2 % TD ointment 1 inch (1 inch Topical Given 12/8/17 1743)   ipratropium (ATROVENT) 0 02 % inhalation solution 0 5 mg (0 5 mg Nebulization Given 12/8/17 1812)   insulin regular (HumuLIN R,NovoLIN R) injection 10 Units (10 Units Intravenous Given 12/8/17 1817)       Diagnostic Studies  Results Reviewed     Procedure Component Value Units Date/Time    BNP [[id number]]  (Normal) Collected:  12/08/17 1731    Lab Status:  Final result Specimen:  Blood from Arm, Right Updated:  12/08/17 1817     NT-proBNP <5 pg/mL     Comprehensive metabolic panel [[id number]]  (Abnormal) Collected:  12/08/17 1731    Lab Status:  Final result Specimen:  Blood from Arm, Right Updated:  12/08/17 1757     Sodium 138 mmol/L      Potassium 3 7 mmol/L      Chloride 101 mmol/L      CO2 25 mmol/L      Anion Gap 12 mmol/L      BUN 16 mg/dL      Creatinine 1 36 (H) mg/dL      Glucose 313 (H) mg/dL      Calcium 9 0 mg/dL      AST 16 U/L      ALT 35 U/L      Alkaline Phosphatase 86 U/L      Total Protein 7 4 g/dL      Albumin 3 5 g/dL      Total Bilirubin 0 40 mg/dL      eGFR 69 ml/min/1 73sq m     Narrative:         National Kidney Disease Education Program recommendations are as follows:  GFR calculation is accurate only with a steady state creatinine  Chronic Kidney disease less than 60 ml/min/1 73 sq  meters  Kidney failure less than 15 ml/min/1 73 sq  meters  Troponin I [[id number]]  (Normal) Collected:  12/08/17 1731    Lab Status:  Final result Specimen:  Blood from Arm, Right Updated:  12/08/17 1756     Troponin I <0 02 ng/mL     Narrative:         [employer] Chemistry analyzer 99% cutoff is > 0 04 ng/mL in network labs    o cTnI 99% cutoff is useful only when applied to patients in the clinical setting of myocardial ischemia  o cTnI 99% cutoff should be interpreted in the context of clinical history, ECG findings and possibly cardiac imaging to establish correct diagnosis  o cTnI 99% cutoff may be suggestive but clearly not indicative of a coronary event without the clinical setting of myocardial ischemia  Equal mix, PT / INR [[id number]] Collected:  12/08/17 1741    Lab Status: In process Specimen:  Blood from Arm, Right Updated:  12/08/17 1747    CBC and differential [[id number]]  (Normal) Collected:  12/08/17 1731    Lab Status:  Final result Specimen:  Blood from Arm, Right Updated:  12/08/17 1739     WBC 8 47 Thousand/uL      RBC 4 93 Million/uL      Hemoglobin 14 2 g/dL      Hematocrit 42 8 %      MCV 87 fL      MCH 28 8 pg      MCHC 33 2 g/dL      RDW 13 6 %      MPV 9 9 fL      Platelets 194 Thousands/uL      nRBC 0 /100 WBCs      Neutrophils Relative 47 %      Lymphocytes Relative 41 %      Monocytes Relative 8 %      Eosinophils Relative 3 %      Basophils Relative 0 %      Neutrophils Absolute 3 98 Thousands/µL      Lymphocytes Absolute 3 50 Thousands/µL      Monocytes Absolute 0 69 Thousand/µL      Eosinophils Absolute 0 27 Thousand/µL      Basophils Absolute 0 02 Thousands/µL                  X-ray chest 2 views   ED Interpretation by Brandi Love DO (12/08 1802)   Prominent hilar adenopathy on L      Final Result by Jarrod Damon DO (12/08 1829)      No active pulmonary disease           Workstation performed: [id number]                    Procedures  ECG 12 Lead Documentation  Date/Time: 12/8/2017 5:39 PM  Performed by: Pearl Gale by: Monica Sy     ECG reviewed by me, the ED Provider: yes    Patient location:  ED  Interpretation:     Interpretation: normal    Rate:     ECG rate assessment: tachycardic    Rhythm:     Rhythm: sinus rhythm    ST segments:     ST segments:  Normal  Other findings:     Other findings: poor R wave progression             Phone Contacts  ED Phone Contact    ED Course  ED Course                                MDM  CritCare Time  Differential diagnosis includes:  1  Chest pain  2  Cardiac disease  3  Rule out MI  4  Diabetes  5  Hyperglycemia  6  Hyperlipidemia  7  Hyper cholesterolemia  8  Bronchitis  9  Rule out pneumonia  10  Left arm numbness  11  Angina  Disposition  Final diagnoses:   Chest pain     Time reflects when diagnosis was documented in both MDM as applicable and the Disposition within this note     Time User Action Codes Description Comment    12/8/2017  6:06 PM Sherl Blocker Add [R07 9] Chest pain       ED Disposition     ED Disposition Condition Comment    Admit  Case was discussed with Dr Uriel De Los Santos and the patient's admission status was agreed to be Admission Status: observation status to the service of Dr Kayden Barber   Follow-up Information    None       Patient's Medications   Discharge Prescriptions    No medications on file     No discharge procedures on file      ED Provider  Electronically Signed by           Bebe Quan DO  12/08/17 6948

## 2017-12-08 NOTE — H&P
History and Physical - [facility] Internal Medicine    Patient Information: Vj De Luna 47 y o  male MRN: [id number]  Unit/Bed#: ED 25 Encounter: [id number]  Admitting Physician: Fernando Parker PA-C  PCP: No primary care provider on file  Date of Admission:  12/08/17    Assessment/Plan:    Hospital Problem List:     Principal Problem:    Chest pain  Active Problems:    Hypertension    Hyperlipidemia    Type 2 diabetes mellitus (HCC)    Asthma    FLORENCE (acute kidney injury) ([facility] [address] )    Tobacco abuse      Plan for the Primary Problem(s):  · Chest pain  · First troponin negative, trend troponins q6h   · Obtain ECHO  · Stress test in AM  · Telemetry monitoring  · Cardiac diet, no chocolate or caffeine   · Encourage tobacco cessation   · BNP <5   · Continue statin    · CXR negative     Plan for Additional Problems:   · HTN  · Most recent /81  · Continue lisinopril 20 mg daily   · HLD  · Continue Lipitor 20 mg daily   · Type 2 DM  · Pt on metformin 1000 mg bid and victoza as an outpatient  · Pt reports his blood sugars run high, does not check them daily  · Hold metformin and victoza  · SSI with glucose checks, adjust if necessary   · Asthma   · No acute exacerbation   · Monitor symptoms, patient does not use inhalers at home  · FLORENCE  · Cr  1 36 today, per records baseline around 0 9  · IVF with NS 0 9% at 125 ml/hr  · Recheck BMP in the morning   · Tobacco abuse   · Patient smokes 1 ppd for the last 20 years   · Nicoderm patch 21 mg   · Encourage cessation     VTE Prophylaxis: Heparin  / sequential compression device   Code Status: Level I - spoke with patient and patient's wife at bedside  POLST: There is no POLST form on file for this patient (pre-hospital)    Anticipated Length of Stay:  Patient will be admitted on an Observation basis with an anticipated length of stay of  < 2 midnights     Justification for Hospital Stay: stress test and trending trops     Total Time for Visit, including Counseling / Coordination of Care: 45 minutes  Greater than 50% of this total time spent on direct patient counseling and coordination of care  Chief Complaint:   "I had chest pain and shortness of breath this afternoon "    History of Present Illness:    Srinivasa Quigley is a 47 y o  male with a significant past medical history of Dm, HTN, HLD and asthma who presents with chest pain x 2 days  Patient denies previous history of chest pain  He reports that he began to feel chest pain yesterday while at rest  This pain felt like a pressure, like "an elephant was sitting on my chest" with radiation down the left arm with some numbness  He reports that the sensation went away after one hour  He did not take any medications at home for the pain  He reports that this afternoon he got the same chest pain sensation with radiation down the arm and started to feel diaphoretic  This was taking place while he was getting dressed for work  He reports that since he was feeling diaphoretic he knew something was wrong and he called an ambulance  He denies lightheadedness, nausea and vomiting during this time  He did admit to some shortness of breath as well with palpitations during the episode  He reports that this episode of chest pain lasted for about 1 hour as well  He rated the pain a 7/10 when he called the ambulance and the pain is subsiding now  He currently rates it a 3/10  Patient does have a history of diabetes for which he takes metformin 1000 mg BID and was recently placed on victoza by his primary care doctor on Monday  He reports that he does not check his blood sugars every day but when he does they are usually high  His wife reports that the highest it's been at home is 336  Pt reports he has not had a stress test in a long time and never remembers having an ECHO done  He has no additional complaints at this time  He currently denies shortness of breath, pain in the calves and leg edema  He denies history of blood clots   All questions were answered to the best of my ability  Patient and wife verbalized understanding at bedside  Review of Systems:    Review of Systems   Constitutional: Positive for diaphoresis  Negative for chills and fever  HENT: Negative for congestion, ear pain, hearing loss, rhinorrhea, sinus pressure and sore throat  Eyes: Negative for pain, discharge, redness and itching  Respiratory: Positive for cough, chest tightness and shortness of breath  Negative for stridor  Cardiovascular: Positive for chest pain and palpitations  Negative for leg swelling  Gastrointestinal: Negative for abdominal pain, blood in stool, constipation, diarrhea, nausea and vomiting  Genitourinary: Negative for difficulty urinating, dysuria and hematuria  Musculoskeletal: Negative for arthralgias, back pain and joint swelling  Skin: Negative for color change, pallor and rash  Neurological: Negative for dizziness, weakness, light-headedness and headaches  Past Medical and Surgical History:     Past Medical History:   Diagnosis Date    Asthma     Diabetes mellitus ([facility] [address] )     Hyperlipidemia     Hypertension        Past Surgical History:   Procedure Laterality Date    APPENDECTOMY      teenager        Meds/Allergies:    Prior to Admission medications    Medication Sig Start Date End Date Taking? Authorizing Provider   atorvastatin (LIPITOR) 20 mg tablet Take 20 mg by mouth daily   Yes Historical Provider, MD   metFORMIN (GLUCOPHAGE) 1000 MG tablet Take by mouth 8/6/15 12/8/17 Yes Historical Provider, MD   lisinopril (ZESTRIL) 20 mg tablet Take 20 mg by mouth daily    Historical Provider, MD   metFORMIN (GLUCOPHAGE) 1000 MG tablet Take 1,000 mg by mouth 2 (two) times a day with meals      Historical Provider, MD   naproxen (NAPROSYN) 500 mg tablet Take 1 tablet by mouth 2 (two) times a day with meals for 14 days 7/10/17 12/8/17  Sandra Snowden MD   ondansetron (ZOFRAN-ODT) 4 mg disintegrating tablet Take 1 tablet by mouth every 8 (eight) hours as needed for nausea for up to 4 days 7/10/17 12/8/17  Boo Snowden MD   polyethylene glycol (MIRALAX) 17 g packet Take 17 g by mouth 2 (two) times a day BID until you have BM  Then daily until you are back to your regular schedule 7/9/17 12/8/17  Trinna Scheuermann, MD   senna-docusate sodium (SENOKOT-S) 8 6-50 mg per tablet Take 1 tablet by mouth daily 7/9/17 12/8/17  Trinna Scheuermann, MD     I have reviewed home medications with patient personally  Allergies: No Known Allergies    Social History:     Marital Status:    Occupation:   Patient Pre-hospital Living Situation: Patient lives at home with his wife and mother-in-law   Patient Pre-hospital Level of Mobility: Full mobility   Patient Pre-hospital Diet Restrictions: None  Substance Use History:   History   Alcohol Use No     History   Smoking Status    Current Every Day Smoker    Packs/day: 1 00    Types: Cigarettes   Smokeless Tobacco    Never Used     History   Drug Use No       Family History:    non-contributory, pt's mother and father with HTN, no strokes or MI family history     Physical Exam:     Vitals:   Blood Pressure: 129/81 (12/08/17 1813)  Pulse: 89 (12/08/17 1813)  Temperature: 98 °F (36 7 °C) (12/08/17 1716)  Respirations: 20 (12/08/17 1813)  Height: 5' 11" (180 3 cm) (12/08/17 1716)  Weight - Scale: 97 9 kg (215 lb 13 3 oz) (12/08/17 1716)  SpO2: 100 % (12/08/17 1813)    Physical Exam   Constitutional: He appears well-developed  No distress  Pt is in no acute distress lying in his hospital bed getting a nebulizer treatment accompanied by his wife   HENT:   Head: Normocephalic and atraumatic  Eyes: No scleral icterus  Neck: Neck supple  Cardiovascular: Normal rate, regular rhythm and intact distal pulses  No murmur heard  Pulmonary/Chest: Effort normal and breath sounds normal  No respiratory distress  He has no wheezes  He has no rales  Abdominal: Soft   Bowel sounds are normal  He exhibits no distension  There is no tenderness  There is no rebound  Musculoskeletal: He exhibits no edema or tenderness  No lower extremity edema, no calf tenderness    Neurological: He is alert  Skin: Skin is warm and dry  He is not diaphoretic  No erythema  Psychiatric: He has a normal mood and affect  Vitals reviewed  Additional Data:     Lab Results: I have personally reviewed pertinent reports  Results from last 7 days  Lab Units 12/08/17  1731   WBC Thousand/uL 8 47   HEMOGLOBIN g/dL 14 2   HEMATOCRIT % 42 8   PLATELETS Thousands/uL 238   NEUTROS PCT % 47   LYMPHS PCT % 41   MONOS PCT % 8   EOS PCT % 3       Results from last 7 days  Lab Units 12/08/17  1731   SODIUM mmol/L 138   POTASSIUM mmol/L 3 7   CHLORIDE mmol/L 101   CO2 mmol/L 25   BUN mg/dL 16   CREATININE mg/dL 1 36*   CALCIUM mg/dL 9 0   TOTAL PROTEIN g/dL 7 4   BILIRUBIN TOTAL mg/dL 0 40   ALK PHOS U/L 86   ALT U/L 35   AST U/L 16   GLUCOSE RANDOM mg/dL 313*           Imaging: I have personally reviewed pertinent reports  X-ray Chest 2 Views    Result Date: 12/8/2017  Narrative: CHEST - DUAL ENERGY INDICATION:  Chest pain  COMPARISON:  11/26/2017 VIEWS:  PA (including soft tissue/bone algorithms) and lateral projections IMAGES:  4 FINDINGS:     Cardiomediastinal silhouette appears unremarkable  The lungs are clear  No pneumothorax or pleural effusion  Visualized osseous structures appear within normal limits for the patient's age  Impression: No active pulmonary disease  Workstation performed: [id number]     Xr Chest 2 Views    Result Date: 11/27/2017  Narrative: CHEST - DUAL ENERGY INDICATION:  Left shoulder pain  COMPARISON:  9/25/2017  VIEWS:  PA (including soft tissue/bone algorithms) and lateral projections IMAGES:  4 FINDINGS:     Cardiomediastinal silhouette appears unremarkable  The lungs are clear  No pneumothorax or pleural effusion   Visualized osseous structures appear within normal limits for the patient's age  Impression: No active pulmonary disease  Workstation performed: [id number]     Ct Abdomen Pelvis With Contrast    Result Date: 11/26/2017  Narrative: CT ABDOMEN AND PELVIS WITH IV CONTRAST INDICATION:  Midabdominal pain COMPARISON: CT performed on 7/10/2017 TECHNIQUE:  CT examination of the abdomen and pelvis was performed  Reformatted images were created in axial, sagittal, and coronal planes  Radiation dose length product (DLP) for this visit:  662 mGy-cm   This examination, like all CT scans performed in the [facility], was performed utilizing techniques to minimize radiation dose exposure, including the use of iterative reconstruction and automated exposure control  IV Contrast:  100 mL of iohexol (OMNIPAQUE)     Enteric Contrast:  Enteric contrast was not administered  FINDINGS: ABDOMEN LOWER CHEST:  No significant abnormalities identified in the lower chest  LIVER/BILIARY TREE:  Unremarkable  GALLBLADDER:  No calcified gallstones  No pericholecystic inflammatory change  SPLEEN:  Unremarkable  PANCREAS:  Unremarkable  No evidence for pancreatitis  ADRENAL GLANDS:  Unremarkable  KIDNEYS/URETERS:  Unremarkable  No hydronephrosis  STOMACH AND BOWEL:  Unremarkable  APPENDIX:  No findings to suggest appendicitis  ABDOMINOPELVIC CAVITY:  No ascites or free intraperitoneal air  No lymphadenopathy  VESSELS:  Unremarkable for patient's age  PELVIS REPRODUCTIVE ORGANS:  Unremarkable for patient's age  URINARY BLADDER:  Unremarkable  ABDOMINAL WALL/INGUINAL REGIONS:  Unremarkable  OSSEOUS STRUCTURES:  Healing right ninth and 10th rib rib fractures posteriorly  Small adjacent pleural thickening versus subcapsular changes noted  Impression: No evidence for an acute inflammatory process within the abdomen or pelvis  Healing right ninth and tenth ribs posteriorly   Workstation performed: [id number]       EKG, Pathology, and Other Studies Reviewed on Admission:   · EKG: Sinus tachycardia, no ST segment elevations  · CT and CXR results reviewed     Allscripts / Epic Records Reviewed: Yes     ** Please Note: This note has been constructed using a voice recognition system   **

## 2017-12-09 LAB
ANION GAP SERPL CALCULATED.3IONS-SCNC: 10 MMOL/L (ref 4–13)
BUN SERPL-MCNC: 14 MG/DL (ref 5–25)
CALCIUM SERPL-MCNC: 7.9 MG/DL (ref 8.3–10.1)
CHLORIDE SERPL-SCNC: 105 MMOL/L (ref 100–108)
CO2 SERPL-SCNC: 24 MMOL/L (ref 21–32)
CREAT SERPL-MCNC: 0.88 MG/DL (ref 0.6–1.3)
GFR SERPL CREATININE-BSD FRML MDRD: 115 ML/MIN/1.73SQ M
GLUCOSE P FAST SERPL-MCNC: 180 MG/DL (ref 65–99)
GLUCOSE SERPL-MCNC: 135 MG/DL (ref 65–140)
GLUCOSE SERPL-MCNC: 163 MG/DL (ref 65–140)
GLUCOSE SERPL-MCNC: 163 MG/DL (ref 65–140)
GLUCOSE SERPL-MCNC: 180 MG/DL (ref 65–140)
GLUCOSE SERPL-MCNC: 216 MG/DL (ref 65–140)
PLATELET # BLD AUTO: 208 THOUSANDS/UL (ref 149–390)
PMV BLD AUTO: 9.7 FL (ref 8.9–12.7)
POTASSIUM SERPL-SCNC: 3.7 MMOL/L (ref 3.5–5.3)
SODIUM SERPL-SCNC: 139 MMOL/L (ref 136–145)
TROPONIN I SERPL-MCNC: 0.02 NG/ML

## 2017-12-09 PROCEDURE — 84484 ASSAY OF TROPONIN QUANT: CPT | Performed by: PHYSICIAN ASSISTANT

## 2017-12-09 PROCEDURE — 80048 BASIC METABOLIC PNL TOTAL CA: CPT | Performed by: PHYSICIAN ASSISTANT

## 2017-12-09 PROCEDURE — 82948 REAGENT STRIP/BLOOD GLUCOSE: CPT

## 2017-12-09 PROCEDURE — 85049 AUTOMATED PLATELET COUNT: CPT | Performed by: PHYSICIAN ASSISTANT

## 2017-12-09 RX ADMIN — HEPARIN SODIUM 5000 UNITS: 5000 INJECTION, SOLUTION INTRAVENOUS; SUBCUTANEOUS at 05:32

## 2017-12-09 RX ADMIN — INSULIN LISPRO 1 UNITS: 100 INJECTION, SOLUTION INTRAVENOUS; SUBCUTANEOUS at 07:52

## 2017-12-09 RX ADMIN — INSULIN LISPRO 1 UNITS: 100 INJECTION, SOLUTION INTRAVENOUS; SUBCUTANEOUS at 11:35

## 2017-12-09 RX ADMIN — LISINOPRIL 20 MG: 20 TABLET ORAL at 08:46

## 2017-12-09 RX ADMIN — HEPARIN SODIUM 5000 UNITS: 5000 INJECTION, SOLUTION INTRAVENOUS; SUBCUTANEOUS at 15:00

## 2017-12-09 RX ADMIN — INSULIN LISPRO 2 UNITS: 100 INJECTION, SOLUTION INTRAVENOUS; SUBCUTANEOUS at 15:06

## 2017-12-09 RX ADMIN — NICOTINE 1 PATCH: 21 PATCH, EXTENDED RELEASE TRANSDERMAL at 08:46

## 2017-12-09 RX ADMIN — HEPARIN SODIUM 5000 UNITS: 5000 INJECTION, SOLUTION INTRAVENOUS; SUBCUTANEOUS at 21:55

## 2017-12-09 RX ADMIN — SODIUM CHLORIDE 125 ML/HR: 0.9 INJECTION, SOLUTION INTRAVENOUS at 22:05

## 2017-12-09 RX ADMIN — SODIUM CHLORIDE 125 ML/HR: 0.9 INJECTION, SOLUTION INTRAVENOUS at 11:37

## 2017-12-09 RX ADMIN — SODIUM CHLORIDE 125 ML/HR: 0.9 INJECTION, SOLUTION INTRAVENOUS at 03:51

## 2017-12-09 RX ADMIN — ATORVASTATIN CALCIUM 20 MG: 20 TABLET, FILM COATED ORAL at 16:18

## 2017-12-09 NOTE — SOCIAL WORK
CM met with patient at bedside  He is alert and oriented  He resides at home with his fiance  He is independent with his ADLs  No hx of HH or DME  Denies hx of MH or substance abuse  He uses [facility] in [facility]  No POA and does not want information  No needs anticipated at this time  CM department will follow patient through discharge

## 2017-12-09 NOTE — CONSULTS
Consultation - Cardiology   Alisson Kuhn 47 y o  male MRN: [id number]  Unit/Bed#: -01 Encounter: [id number]    Assessment/Plan     Assessment:  1  Chest pain  MI ruled out  Plan:  1  Stress test and echocardiogram ordered  History of Present Illness   Physician Requesting Consult: Lauren Babcock MD  Reason for Consult / Principal Problem: Chest discomfort  HPI: Alisson Kuhn is a 47y o  year old male who presents with chest discomfort described as a feeling as though someone was sitting on his chest   The discomfort radiated down his left arm  Consults    Review of Systems   Constitutional: Positive for activity change  Respiratory: Positive for chest tightness  Negative for shortness of breath and wheezing  Cardiovascular: Positive for chest pain  Negative for palpitations and leg swelling         Historical Information   Past Medical History:   Diagnosis Date    Asthma     Diabetes mellitus ([address] )     Hyperlipidemia     Hypertension      Past Surgical History:   Procedure Laterality Date    APPENDECTOMY      teenager      History   Alcohol Use No     History   Drug Use No     History   Smoking Status    Current Every Day Smoker    Packs/day: 1 00    Types: Cigarettes   Smokeless Tobacco    Never Used     Family History:   Family History   Problem Relation Age of Onset    Hypertension Mother     Hypertension Father        Meds/Allergies   all current active meds have been reviewed and current meds:   Current Facility-Administered Medications   Medication Dose Route Frequency    acetaminophen (TYLENOL) tablet 650 mg  650 mg Oral Q4H PRN    atorvastatin (LIPITOR) tablet 20 mg  20 mg Oral Daily With Dinner    heparin (porcine) subcutaneous injection 5,000 Units  5,000 Units Subcutaneous Q8H [address]    insulin lispro (HumaLOG) 100 units/mL subcutaneous injection 1-6 Units  1-6 Units Subcutaneous TID AC    insulin lispro (HumaLOG) 100 units/mL subcutaneous injection 1-6 Units  1-6 Units Subcutaneous HS    lisinopril (ZESTRIL) tablet 20 mg  20 mg Oral Daily    nicotine (NICODERM CQ) 21 mg/24 hr TD 24 hr patch 1 patch  1 patch Transdermal Daily    pneumococcal 13-valent conjugate vaccine (PREVNAR-13) IM injection 0 5 mL  0 5 mL Intramuscular Prior to discharge    sodium chloride 0 9 % infusion  125 mL/hr Intravenous Continuous     No Known Allergies    Objective   Vitals: Blood pressure 132/72, pulse 79, temperature 98 7 °F (37 1 °C), temperature source Oral, resp  rate 19, height 5' 11" (1 803 m), weight 97 9 kg (215 lb 13 3 oz), SpO2 98 %  Orthostatic Blood Pressures    Flowsheet Row Most Recent Value   Blood Pressure  132/72 filed at 12/09/2017 1100   Patient Position - Orthostatic VS  Lying filed at 12/09/2017 1100            Intake/Output Summary (Last 24 hours) at 12/09/17 1335  Last data filed at 12/09/17 1137   Gross per 24 hour   Intake           2582 5 ml   Output              300 ml   Net           2282 5 ml       Invasive Devices     Peripheral Intravenous Line            Peripheral IV 12/09/17 Left Wrist less than 1 day                Physical Exam   Constitutional: He appears well-developed and well-nourished  No distress  HENT:   Head: Normocephalic and atraumatic  Neck: Neck supple  Cardiovascular: Normal rate, regular rhythm, S1 normal and S2 normal   Exam reveals no gallop and no friction rub  Murmur heard  Systolic murmur is present with a grade of 2/6   Pulmonary/Chest: Effort normal and breath sounds normal    Neurological: He is alert  Skin: Skin is warm and dry  He is not diaphoretic         Lab Results:   CBC with diff:   Results from last 7 days  Lab Units 12/09/17  0002 12/08/17  1731   WBC Thousand/uL  --  8 47   RBC Million/uL  --  4 93   HEMOGLOBIN g/dL  --  14 2   HEMATOCRIT %  --  42 8   MCV fL  --  87   MCH pg  --  28 8   MCHC g/dL  --  33 2   RDW %  --  13 6   MPV fL 9 7 9 9   PLATELETS Thousands/uL 208 238     CMP:   Results from last 7 days  Lab Units 12/09/17  0603 12/08/17  1731   SODIUM mmol/L 139 138   POTASSIUM mmol/L 3 7 3 7   CHLORIDE mmol/L 105 101   CO2 mmol/L 24 25   ANION GAP mmol/L 10 12   BUN mg/dL 14 16   CREATININE mg/dL 0 88 1 36*   GLUCOSE RANDOM mg/dL 180* 313*   CALCIUM mg/dL 7 9* 9 0   AST U/L  --  16   ALT U/L  --  35   ALK PHOS U/L  --  86   TOTAL PROTEIN g/dL  --  7 4   ALBUMIN g/dL  --  3 5   BILIRUBIN TOTAL mg/dL  --  0 40   EGFR ml/min/1 73sq m 115 69     Troponin:   0  Lab Value Date/Time   TROPONINI 0 02 12/09/2017 0002   TROPONINI <0 02 12/08/2017 2058   TROPONINI <0 02 12/08/2017 1731   TROPONINI <0 02 11/26/2017 1835     BNP:   Results from last 7 days  Lab Units 12/09/17  0603   SODIUM mmol/L 139   POTASSIUM mmol/L 3 7   CHLORIDE mmol/L 105   CO2 mmol/L 24   ANION GAP mmol/L 10   BUN mg/dL 14   CREATININE mg/dL 0 88   GLUCOSE RANDOM mg/dL 180*   CALCIUM mg/dL 7 9*   EGFR ml/min/1 73sq m 115     Coags:     TSH:     Imaging: I have personally reviewed pertinent reports  EKG: Pending  VTE Prophylaxis:     Code Status: Level 1 - Full Code  Advance Directive and Living Will:      Power of :    POLST:      Counseling / Coordination of Care  Total floor / unit time spent today 50 minutes  Greater than 50% of total time was spent with the patient and / or family counseling and / or coordination of care  A description of the counseling / coordination of care:50

## 2017-12-09 NOTE — PLAN OF CARE
Problem: PAIN - ADULT  Goal: Verbalizes/displays adequate comfort level or baseline comfort level  Interventions:  - Encourage patient to monitor pain and request assistance  - Assess pain using appropriate pain scale  - Administer analgesics based on type and severity of pain and evaluate response  - Implement non-pharmacological measures as appropriate and evaluate response  - Consider cultural and social influences on pain and pain management  - Notify physician/advanced practitioner if interventions unsuccessful or patient reports new pain  Outcome: Progressing      Problem: INFECTION - ADULT  Goal: Absence or prevention of progression during hospitalization  INTERVENTIONS:  - Assess and monitor for signs and symptoms of infection  - Monitor lab/diagnostic results  - Monitor all insertion sites, i e  indwelling lines, tubes, and drains  - Monitor endotracheal (as able) and nasal secretions for changes in amount and color  - [place] appropriate cooling/warming therapies per order  - Administer medications as ordered  - Instruct and encourage patient and family to use good hand hygiene technique  - Identify and instruct in appropriate isolation precautions for identified infection/condition  Outcome: Progressing    Goal: Absence of fever/infection during neutropenic period  INTERVENTIONS:  - Monitor WBC  - Implement neutropenic guidelines  Outcome: Progressing      Problem: SAFETY ADULT  Goal: Patient will remain free of falls  INTERVENTIONS:  - Assess patient frequently for physical needs  -  Identify cognitive and physical deficits and behaviors that affect risk of falls    -  [place] fall precautions as indicated by assessment   - Educate patient/family on patient safety including physical limitations  - Instruct patient to call for assistance with activity based on assessment  - Modify environment to reduce risk of injury  - Consider OT/PT consult to assist with strengthening/mobility  Outcome: Progressing    Goal: Maintain or return to baseline ADL function  INTERVENTIONS:  -  Assess patient's ability to carry out ADLs; assess patient's baseline for ADL function and identify physical deficits which impact ability to perform ADLs (bathing, care of mouth/teeth, toileting, grooming, dressing, etc )  - Assess/evaluate cause of self-care deficits   - Assess range of motion  - Assess patient's mobility; develop plan if impaired  - Assess patient's need for assistive devices and provide as appropriate  - Encourage maximum independence but intervene and supervise when necessary  ¯ Involve family in performance of ADLs  ¯ Assess for home care needs following discharge   ¯ Request OT consult to assist with ADL evaluation and planning for discharge  ¯ Provide patient education as appropriate  Outcome: Progressing    Goal: Maintain or return mobility status to optimal level  INTERVENTIONS:  - Assess patient's baseline mobility status (ambulation, transfers, stairs, etc )    - Identify cognitive and physical deficits and behaviors that affect mobility  - Identify mobility aids required to assist with transfers and/or ambulation (gait belt, sit-to-stand, lift, walker, cane, etc )  - [place] fall precautions as indicated by assessment  - Record patient progress and toleration of activity level on Mobility SBAR; progress patient to next Phase/Stage  - Instruct patient to call for assistance with activity based on assessment  - Request Rehabilitation consult to assist with strengthening/weightbearing, etc   Outcome: Progressing      Problem: DISCHARGE PLANNING  Goal: Discharge to home or other facility with appropriate resources  INTERVENTIONS:  - Identify barriers to discharge w/patient and caregiver  - Arrange for needed discharge resources and transportation as appropriate  - Identify discharge learning needs (meds, wound care, etc )  - Arrange for interpretive services to assist at discharge as needed  - Refer to Case Management Department for coordinating discharge planning if the patient needs post-hospital services based on physician/advanced practitioner order or complex needs related to functional status, cognitive ability, or social support system  Outcome: Progressing      Problem: Knowledge Deficit  Goal: Patient/family/caregiver demonstrates understanding of disease process, treatment plan, medications, and discharge instructions  Complete learning assessment and assess knowledge base  Interventions:  - Provide teaching at level of understanding  - Provide teaching via preferred learning methods  Outcome: Progressing      Problem: CARDIOVASCULAR - ADULT  Goal: Maintains optimal cardiac output and hemodynamic stability  INTERVENTIONS:  - Monitor I/O, vital signs and rhythm  - Monitor for S/S and trends of decreased cardiac output i e  bleeding, hypotension  - Administer and titrate ordered vasoactive medications to optimize hemodynamic stability  - Assess quality of pulses, skin color and temperature  - Assess for signs of decreased coronary artery perfusion - ex   Angina  - Instruct patient to report change in severity of symptoms  Outcome: Progressing    Goal: Absence of cardiac dysrhythmias or at baseline rhythm  INTERVENTIONS:  - Continuous cardiac monitoring, monitor vital signs, obtain 12 lead EKG if indicated  - Administer antiarrhythmic and heart rate control medications as ordered  - Monitor electrolytes and administer replacement therapy as ordered  Outcome: Progressing

## 2017-12-09 NOTE — PLAN OF CARE
Problem: DISCHARGE PLANNING - CARE MANAGEMENT  Goal: Discharge to post-acute care or home with appropriate resources  INTERVENTIONS:  - Conduct assessment to determine patient/family and health care team treatment goals, and need for post-acute services based on payer coverage, community resources, and patient preferences, and barriers to discharge  - Address psychosocial, clinical, and financial barriers to discharge as identified in assessment in conjunction with the patient/family and health care team  - Arrange appropriate level of post-acute services according to patients   needs and preference and payer coverage in collaboration with the physician and health care team  - Communicate with and update the patient/family, physician, and health care team regarding progress on the discharge plan  - Arrange appropriate transportation to post-acute venues  Outcome: Progressing  CM met with patient at bedside  He is alert and oriented  He resides at home with his fiance  He is independent with his ADLs  No hx of HH or DME  Denies hx of MH or substance abuse  He uses [facility] in [facility]  No POA and does not want information    No needs anticipated at this time  CM department will follow patient through discharge

## 2017-12-09 NOTE — CASE MANAGEMENT
[address] [place] [address] in the [facility] by Chan Pepper for 2017  Network Utilization Review Department  Phone: [phone]; Fax [phone]  ATTENTION: The Network Utilization Review Department is now centralized for our 7 Facilities  Make a note that we have a new phone and fax numbers for our Department  Please call with any questions or concerns to [phone] and carefully follow the prompts so that you are directed to the right person  All voicemails are confidential  Fax any determinations, approvals, denials, and requests for initial or continue stay review clinical to [phone]  Due to HIGH CALL volume, it would be easier if you could please send faxed requests to expedite your requests and in part, help us provide discharge notifications faster  Initial Clinical Review    Admission: Date/Time/Statement: 12/8 1815    Orders Placed This Encounter   Procedures    Place in Observation (expected length of stay for this patient is less than two midnights)     Standing Status:   Standing     Number of Occurrences:   1     Order Specific Question:   Admitting Physician     Answer:   Sirisha Gallardo     Order Specific Question:   Level of Care     Answer:   Med Surg [16]         ED: Date/Time/Mode of Arrival:   ED Arrival Information     Expected Arrival Acuity Means of Arrival Escorted By Service Admission Type    - 12/8/2017 17:02 Emergent Ambulance [facility]    Arrival Complaint    CHEST PAIN          Chief Complaint:   Chief Complaint   Patient presents with    Chest Pain     Patient presents today with a chief complaint of chest pain  Onset was 1 hour ago  Patient was getting dressed for work when he felt like "someone was sitting" on his chest  Patient also became cool and clammy  Patient had a similar episode yesterday  Patent also experienced nausea   Pain was 7/10       History of Illness: Cassandra Timo is a 47 y o  male with a significant past medical history of Dm, HTN, HLD and asthma who presents with chest pain x 2 days  Patient denies previous history of chest pain  He reports that he began to feel chest pain yesterday while at rest  This pain felt like a pressure, like "an elephant was sitting on my chest" with radiation down the left arm with some numbness  He reports that the sensation went away after one hour  He did not take any medications at home for the pain  He reports that this afternoon he got the same chest pain sensation with radiation down the arm and started to feel diaphoretic  This was taking place while he was getting dressed for work  He reports that since he was feeling diaphoretic he knew something was wrong and he called an ambulance  He denies lightheadedness, nausea and vomiting during this time  He did admit to some shortness of breath as well with palpitations during the episode  He reports that this episode of chest pain lasted for about 1 hour as well  He rated the pain a 7/10 when he called the ambulance and the pain is subsiding now  He currently rates it a 3/10  Patient does have a history of diabetes for which he takes metformin 1000 mg BID and was recently placed on victoza by his primary care doctor on Monday  He reports that he does not check his blood sugars every day but when he does they are usually high  His wife reports that the highest it's been at home is 336  Pt reports he has not had a stress test in a long time and never remembers having an ECHO done  He has no additional complaints at this time  He currently denies shortness of breath, pain in the calves and leg edema  He denies history of blood clots  All questions were answered to the best of my ability  Patient and wife verbalized understanding at bedside      ED Vital Signs:   ED Triage Vitals   Temperature Pulse Respirations Blood Pressure SpO2   12/08/17 1716 12/08/17 1716 12/08/17 1716 12/08/17 1716 12/08/17 1716 98 °F (36 7 °C) (!) 109 18 132/82 98 %      Temp Source Heart Rate Source Patient Position - Orthostatic VS BP Location FiO2 (%)   12/08/17 1935 12/08/17 1716 12/08/17 1716 12/08/17 1716 --   Oral Monitor Sitting Right arm       Pain Score       12/08/17 1716       7        Wt Readings from Last 1 Encounters:   12/08/17 97 9 kg (215 lb 13 3 oz)       Vital Signs (abnormal): wnl     Abnormal Labs/Diagnostic Test Results: BUN creat   c16 1 36, gluc  313  CXR - wnl     ED Treatment:   Medication Administration from 12/08/2017 1702 to 12/08/2017 1935       Date/Time Order Dose Route Action Action by Comments     12/08/2017 1743 nitroglycerin (NITRO-BID) 2 % TD ointment 1 inch 1 inch Topical Given Rakel Carlton RN      12/08/2017 1812 ipratropium (ATROVENT) 0 02 % inhalation solution 0 5 mg 0 5 mg Nebulization Given Rakel Carlton RN      12/08/2017 1813 sodium chloride 0 9 % bolus 500 mL 500 mL Intravenous New Bag Rakel Carlton RN      12/08/2017 1817 insulin regular (HumuLIN R,NovoLIN R) injection 10 Units 10 Units Intravenous Given Rakel Carlton RN           Past Medical/Surgical History: Active Ambulatory Problems     Diagnosis Date Noted    No Active Ambulatory Problems     Resolved Ambulatory Problems     Diagnosis Date Noted    No Resolved Ambulatory Problems     Past Medical History:   Diagnosis Date    Asthma     [facility]     Hyperlipidemia     Hypertension        Admitting Diagnosis: Chest pain [R07 9]    Age/Sex: 47 y o  male    Assessment/Plan:    Hospital Problem List:      Principal Problem:    Chest pain  Active Problems:    Hypertension    Hyperlipidemia    Type 2 diabetes mellitus (HCC)    Asthma    FLORENCE (acute kidney injury) ([facility] )    Tobacco abuse   Plan for the Primary Problem(s):  · Chest pain  ? First troponin negative, trend troponins q6h   ? Obtain ECHO  ? Stress test in AM  ? Telemetry monitoring  ? Cardiac diet, no chocolate or caffeine   ? Encourage tobacco cessation   ?  BNP <5   ? Continue statin    ? CXR negative      Plan for Additional Problems:   · HTN  ? Most recent /81  ? Continue lisinopril 20 mg daily   · HLD  ? Continue Lipitor 20 mg daily   · Type 2 DM  ? Pt on metformin 1000 mg bid and victoza as an outpatient  ? Pt reports his blood sugars run high, does not check them daily  ? Hold metformin and victoza  ? SSI with glucose checks, adjust if necessary   · Asthma   ? No acute exacerbation   ? Monitor symptoms, patient does not use inhalers at home  · FLORENCE  ? Cr  1 36 today, per records baseline around 0 9  ? IVF with NS 0 9% at 125 ml/hr  ? Recheck BMP in the morning   · Tobacco abuse   ? Patient smokes 1 ppd for the last 20 years   ? Nicoderm patch 21 mg   ? Encourage cessation      VTE Prophylaxis: Heparin  / sequential compression device   Code Status: Level I - spoke with patient and patient's wife at bedside  POLST: There is no POLST form on file for this patient (pre-hospital)     Anticipated Length of Stay:  Patient will be admitted on an Observation basis with an anticipated length of stay of  < 2 midnights  Justification for Hospital Stay: stress test and trending trops        Admission Orders:  Scheduled Meds:   atorvastatin 20 mg Oral Daily With Dinner   heparin (porcine) 5,000 Units Subcutaneous Q8H [address]   insulin lispro 1-6 Units Subcutaneous TID AC   insulin lispro 1-6 Units Subcutaneous HS   lisinopril 20 mg Oral Daily   nicotine 1 patch Transdermal Daily     Continuous Infusions:   sodium chloride 125 mL/hr Last Rate: 125 mL/hr (12/09/17 0351)     PRN Meds:   acetaminophen    pneumococcal 13-valent conjugate vaccine    Fingerstick ac and hs   Cardiac diet   Up and OOB as pe[place]   EKG prn cp   Tele   Echo   Stress test   SCD  12/9   Bmp, plt ct   Serial trop     Cardiology consult 12/9   Assessment:  1  Chest pain  MI ruled out  Plan:  1   Stress test and echocardiogram ordered

## 2017-12-09 NOTE — PROGRESS NOTES
[facility] Progress Note  Patient: Ellis Silverman 47 y o  male   MRN: [id number]  PCP: No primary care provider on file  Unit/Bed#: MS Joe-Ingrid Encounter: [id number]  Date Of Visit: 12/09/17    Assessment:    Principal Problem:    Chest pain  Active Problems:    Hypertension    Hyperlipidemia    Type 2 diabetes mellitus (HCC)    Asthma    FLORENCE (acute kidney injury) ([address] )    Tobacco abuse      Plan:    · Chest pain:  Described as elephant sitting on chest radiating down left arm  Patient has ruled out for myocardial infarction based on negative troponins however his symptoms are concerning especially in the face of a heart score of 5  I have consulted Cardiology  Echo and stress test are pending  Continue Lipitor  · Hypertension:  Continue Zestril  · Tobacco abuse counseled against continue Nicoderm  · Asthma continue Atrovent as needed  · Diabetes:  Patient on metformin at home on hold secondary to possible need for testing  Continue sliding scale insulin  · Acute kidney injury of unclear significance  Back to baseline  Continue to monitor      VTE Pharmacologic Prophylaxis:   Pharmacologic: Heparin  Mechanical VTE Prophylaxis in Place: Yes    Patient Centered Rounds: I have evaluated patient without nursing staff present due to Updated staff after my visit    Discussions with Specialists or Other Care Team Provider:  Discussed with Dr Giovana Giron    Education and Discussions with Family / Patient:  Updated spouse at bedside    Time Spent for Care: 20 minutes  More than 50% of total time spent on counseling and coordination of care as described above  Current Length of Stay: 0 day(s)    Current Patient Status: Observation   Certification Statement: The patient will continue to require additional inpatient hospital stay due to Awaiting echo and stress testing    Discharge Plan / Estimated Discharge Date:   To be determined    Code Status: Level 1 - Full Code      Subjective:   Patient states he has not had any further chest pain but he definitively describes his initial event with diaphoresis cold and clammy feeling and feeling like an elephant was sitting on his chest   Patient has been counseled advance tobacco abuse  I have told him I would like Cardiology to look at him because of his high risk description of his pain  Objective:     Vitals:   Temp (24hrs), Av 3 °F (36 8 °C), Min:97 4 °F (36 3 °C), Max:98 7 °F (37 1 °C)    HR:  [78-94] 85  Resp:  [16-20] 16  BP: (118-136)/(53-82) 135/72  SpO2:  [93 %-100 %] 97 %  Body mass index is 30 1 kg/m²  Input and Output Summary (last 24 hours):        Intake/Output Summary (Last 24 hours) at 17 1742  Last data filed at 17 1300   Gross per 24 hour   Intake           2942 5 ml   Output             1350 ml   Net           1592 5 ml       Physical Exam:     Physical Exam generally well-developed moderately overweight  male no acute distress he is normocephalic atraumatic pupils equal round and reactive to light extraocular muscles are intact mucous membranes are moist neck is supple there is no JVD no lymph nodes no carotid bruits chest is decreased but clear to auscultation is no rhonchi rales or wheezes  Chest is clear to auscultation is no rhonchi rales or wheezes  Cardiovascular regular rate rhythm positive S1 and S2 no S3 for murmur or gallop  Abdomen soft nontender nondistended with positive bowel sounds no hepatosplenomegaly no guarding or rebound  Extremities:  No clubbing cyanosis edema    Additional Data:     Labs:      Results from last 7 days  Lab Units 17  0002 17  1731   WBC Thousand/uL  --  8 47   HEMOGLOBIN g/dL  --  14 2   HEMATOCRIT %  --  42 8   PLATELETS Thousands/uL 208 238   NEUTROS PCT %  --  47   LYMPHS PCT %  --  41   MONOS PCT %  --  8   EOS PCT %  --  3       Results from last 7 days  Lab Units 17  0603 17  1731   SODIUM mmol/L 139 138   POTASSIUM mmol/L 3 7 3 7   CHLORIDE mmol/L 105 101   CO2 mmol/L 24 25   BUN mg/dL 14 16   CREATININE mg/dL 0 88 1 36*   CALCIUM mg/dL 7 9* 9 0   TOTAL PROTEIN g/dL  --  7 4   BILIRUBIN TOTAL mg/dL  --  0 40   ALK PHOS U/L  --  86   ALT U/L  --  35   AST U/L  --  16   GLUCOSE RANDOM mg/dL 180* 313*           * I Have Reviewed All Lab Data Listed Above  * Additional Pertinent Lab Tests Reviewed: All Labs Within Last 24 Hours Reviewed    Imaging:    Imaging Reports Reviewed Today Include:  None  Imaging Personally Reviewed by Myself Includes:  None    Recent Cultures (last 7 days):           Last 24 Hours Medication List:     atorvastatin 20 mg Oral Daily With Dinner   heparin (porcine) 5,000 Units Subcutaneous Q8H [facility]   insulin lispro 1-6 Units Subcutaneous TID AC   insulin lispro 1-6 Units Subcutaneous HS   lisinopril 20 mg Oral Daily   nicotine 1 patch Transdermal Daily        Today, Patient Was Seen By: Eriberto Hare MD Pager : [phone]     ** Please Note: This note has been constructed using a voice recognition system   **

## 2017-12-10 ENCOUNTER — APPOINTMENT (OUTPATIENT)
Dept: NON INVASIVE DIAGNOSTICS | Facility: HOSPITAL | Age: 52
End: 2017-12-10
Payer: COMMERCIAL

## 2017-12-10 LAB
ATRIAL RATE: 102 BPM
GLUCOSE SERPL-MCNC: 166 MG/DL (ref 65–140)
GLUCOSE SERPL-MCNC: 169 MG/DL (ref 65–140)
GLUCOSE SERPL-MCNC: 191 MG/DL (ref 65–140)
GLUCOSE SERPL-MCNC: 202 MG/DL (ref 65–140)
P AXIS: 54 DEGREES
PR INTERVAL: 176 MS
QRS AXIS: -26 DEGREES
QRSD INTERVAL: 98 MS
QT INTERVAL: 346 MS
QTC INTERVAL: 450 MS
T WAVE AXIS: 53 DEGREES
VENTRICULAR RATE: 102 BPM

## 2017-12-10 PROCEDURE — 93306 TTE W/DOPPLER COMPLETE: CPT

## 2017-12-10 PROCEDURE — 82948 REAGENT STRIP/BLOOD GLUCOSE: CPT

## 2017-12-10 RX ORDER — ASPIRIN 81 MG/1
81 TABLET, CHEWABLE ORAL DAILY
Status: DISCONTINUED | OUTPATIENT
Start: 2017-12-11 | End: 2017-12-11 | Stop reason: HOSPADM

## 2017-12-10 RX ADMIN — INSULIN LISPRO 1 UNITS: 100 INJECTION, SOLUTION INTRAVENOUS; SUBCUTANEOUS at 08:35

## 2017-12-10 RX ADMIN — SODIUM CHLORIDE 125 ML/HR: 0.9 INJECTION, SOLUTION INTRAVENOUS at 14:44

## 2017-12-10 RX ADMIN — INSULIN LISPRO 2 UNITS: 100 INJECTION, SOLUTION INTRAVENOUS; SUBCUTANEOUS at 21:22

## 2017-12-10 RX ADMIN — INSULIN LISPRO 2 UNITS: 100 INJECTION, SOLUTION INTRAVENOUS; SUBCUTANEOUS at 11:47

## 2017-12-10 RX ADMIN — SODIUM CHLORIDE 125 ML/HR: 0.9 INJECTION, SOLUTION INTRAVENOUS at 06:07

## 2017-12-10 RX ADMIN — ATORVASTATIN CALCIUM 20 MG: 20 TABLET, FILM COATED ORAL at 16:12

## 2017-12-10 RX ADMIN — HEPARIN SODIUM 5000 UNITS: 5000 INJECTION, SOLUTION INTRAVENOUS; SUBCUTANEOUS at 14:12

## 2017-12-10 RX ADMIN — INSULIN LISPRO 1 UNITS: 100 INJECTION, SOLUTION INTRAVENOUS; SUBCUTANEOUS at 16:13

## 2017-12-10 RX ADMIN — HEPARIN SODIUM 5000 UNITS: 5000 INJECTION, SOLUTION INTRAVENOUS; SUBCUTANEOUS at 05:10

## 2017-12-10 RX ADMIN — LISINOPRIL 20 MG: 20 TABLET ORAL at 08:36

## 2017-12-10 RX ADMIN — NICOTINE 1 PATCH: 21 PATCH, EXTENDED RELEASE TRANSDERMAL at 09:00

## 2017-12-10 RX ADMIN — HEPARIN SODIUM 5000 UNITS: 5000 INJECTION, SOLUTION INTRAVENOUS; SUBCUTANEOUS at 21:23

## 2017-12-10 NOTE — CASE MANAGEMENT
Continued Stay Review    Date: 12/10    Vital Signs: /78   Pulse 79   Temp 97 9 °F (36 6 °C) (Oral)   Resp 18   Ht 5' 11" (1 803 m)   Wt 97 9 kg (215 lb 13 3 oz)   SpO2 94%   BMI 30 10 kg/m²     Medications:   Scheduled Meds:   atorvastatin 20 mg Oral Daily With Dinner   heparin (porcine) 5,000 Units Subcutaneous Q8H [address]   insulin lispro 1-6 Units Subcutaneous TID AC   insulin lispro 1-6 Units Subcutaneous HS   lisinopril 20 mg Oral Daily   nicotine 1 patch Transdermal Daily     Continuous Infusions:   sodium chloride 125 mL/hr Last Rate: 125 mL/hr (12/10/17 6676)     PRN Meds:   acetaminophen    pneumococcal 13-valent conjugate vaccine    Abnormal Labs/Diagnostic Results: BS  169    Age/Sex: 47 y o  male     Assessment/Plan: Note from 12/9  Principal Problem:    Chest pain  Active Problems:    Hypertension    Hyperlipidemia    Type 2 diabetes mellitus (HCC)    Asthma    FLORENCE (acute kidney injury) ([facility] )    Tobacco abuse        Plan:     · Chest pain:  Described as elephant sitting on chest radiating down left arm  Patient has ruled out for myocardial infarction based on negative troponins however his symptoms are concerning especially in the face of a heart score of 5  I have consulted Cardiology  Echo and stress test are pending  Continue Lipitor  · Hypertension:  Continue Zestril  · Tobacco abuse counseled against continue Nicoderm  · Asthma continue Atrovent as needed  · Diabetes:  Patient on metformin at home on hold secondary to possible need for testing  Continue sliding scale insulin  · Acute kidney injury of unclear significance  Back to baseline  Continue to monitor        VTE Pharmacologic Prophylaxis:   Pharmacologic: Heparin  Mechanical VTE Prophylaxis in Place:  Yes     Patient Centered Rounds: I have evaluated patient without nursing staff present due to Updated staff after my visit     Discussions with Specialists or Other Care Team Provider:  Discussed with   Roe     Education and Discussions with Family / Patient:  Updated spouse at bedside     Time Spent for Care: 20 minutes  More than 50% of total time spent on counseling and coordination of care as described above      Current Length of Stay: 0 day(s)     Current Patient Status: Observation   Certification Statement: The patient will continue to require additional inpatient hospital stay due to Awaiting echo and stress testing     Discharge Plan / Estimated Discharge Date:   To be determined

## 2017-12-10 NOTE — PLAN OF CARE
Problem: PAIN - ADULT  Goal: Verbalizes/displays adequate comfort level or baseline comfort level  Interventions:  - Encourage patient to monitor pain and request assistance  - Assess pain using appropriate pain scale  - Administer analgesics based on type and severity of pain and evaluate response  - Implement non-pharmacological measures as appropriate and evaluate response  - Consider cultural and social influences on pain and pain management  - Notify physician/advanced practitioner if interventions unsuccessful or patient reports new pain   Outcome: Progressing      Problem: INFECTION - ADULT  Goal: Absence or prevention of progression during hospitalization  INTERVENTIONS:  - Assess and monitor for signs and symptoms of infection  - Monitor lab/diagnostic results  - Monitor all insertion sites, i e  indwelling lines, tubes, and drains  - Monitor endotracheal (as able) and nasal secretions for changes in amount and color  - [place] appropriate cooling/warming therapies per order  - Administer medications as ordered  - Instruct and encourage patient and family to use good hand hygiene technique  - Identify and instruct in appropriate isolation precautions for identified infection/condition   Outcome: Progressing    Goal: Absence of fever/infection during neutropenic period  INTERVENTIONS:  - Monitor WBC  - Implement neutropenic guidelines   Outcome: Progressing      Problem: SAFETY ADULT  Goal: Patient will remain free of falls  INTERVENTIONS:  - Assess patient frequently for physical needs  -  Identify cognitive and physical deficits and behaviors that affect risk of falls    -  [place] fall precautions as indicated by assessment   - Educate patient/family on patient safety including physical limitations  - Instruct patient to call for assistance with activity based on assessment  - Modify environment to reduce risk of injury  - Consider OT/PT consult to assist with strengthening/mobility    Outcome: Progressing    Goal: Maintain or return to baseline ADL function  INTERVENTIONS:  -  Assess patient's ability to carry out ADLs; assess patient's baseline for ADL function and identify physical deficits which impact ability to perform ADLs (bathing, care of mouth/teeth, toileting, grooming, dressing, etc )  - Assess/evaluate cause of self-care deficits   - Assess range of motion  - Assess patient's mobility; develop plan if impaired  - Assess patient's need for assistive devices and provide as appropriate  - Encourage maximum independence but intervene and supervise when necessary  ¯ Involve family in performance of ADLs  ¯ Assess for home care needs following discharge   ¯ Request OT consult to assist with ADL evaluation and planning for discharge  ¯ Provide patient education as appropriate   Outcome: Progressing    Goal: Maintain or return mobility status to optimal level  INTERVENTIONS:  - Assess patient's baseline mobility status (ambulation, transfers, stairs, etc )    - Identify cognitive and physical deficits and behaviors that affect mobility  - Identify mobility aids required to assist with transfers and/or ambulation (gait belt, sit-to-stand, lift, walker, cane, etc )  - [place] fall precautions as indicated by assessment  - Record patient progress and toleration of activity level on Mobility SBAR; progress patient to next Phase/Stage  - Instruct patient to call for assistance with activity based on assessment  - Request Rehabilitation consult to assist with strengthening/weightbearing, etc    Outcome: Progressing      Problem: DISCHARGE PLANNING  Goal: Discharge to home or other facility with appropriate resources  INTERVENTIONS:  - Identify barriers to discharge w/patient and caregiver  - Arrange for needed discharge resources and transportation as appropriate  - Identify discharge learning needs (meds, wound care, etc )  - Arrange for interpretive services to assist at discharge as needed  - Refer to Case Management Department for coordinating discharge planning if the patient needs post-hospital services based on physician/advanced practitioner order or complex needs related to functional status, cognitive ability, or social support system   Outcome: Progressing      Problem: Knowledge Deficit  Goal: Patient/family/caregiver demonstrates understanding of disease process, treatment plan, medications, and discharge instructions  Complete learning assessment and assess knowledge base  Interventions:  - Provide teaching at level of understanding  - Provide teaching via preferred learning methods   Outcome: Progressing      Problem: CARDIOVASCULAR - ADULT  Goal: Maintains optimal cardiac output and hemodynamic stability  INTERVENTIONS:  - Monitor I/O, vital signs and rhythm  - Monitor for S/S and trends of decreased cardiac output i e  bleeding, hypotension  - Administer and titrate ordered vasoactive medications to optimize hemodynamic stability  - Assess quality of pulses, skin color and temperature  - Assess for signs of decreased coronary artery perfusion - ex  Angina  - Instruct patient to report change in severity of symptoms   Outcome: Progressing    Goal: Absence of cardiac dysrhythmias or at baseline rhythm  INTERVENTIONS:  - Continuous cardiac monitoring, monitor vital signs, obtain 12 lead EKG if indicated  - Administer antiarrhythmic and heart rate control medications as ordered  - Monitor electrolytes and administer replacement therapy as ordered   Outcome: Progressing      Problem: Potential for Falls  Goal: Patient will remain free of falls  INTERVENTIONS:  - Assess patient frequently for physical needs  -  Identify cognitive and physical deficits and behaviors that affect risk of falls    -  [place] fall precautions as indicated by assessment   - Educate patient/family on patient safety including physical limitations  - Instruct patient to call for assistance with activity based on assessment  - Modify environment to reduce risk of injury  - Consider OT/PT consult to assist with strengthening/mobility    Outcome: Progressing      Problem: DISCHARGE PLANNING - CARE MANAGEMENT  Goal: Discharge to post-acute care or home with appropriate resources  INTERVENTIONS:  - Conduct assessment to determine patient/family and health care team treatment goals, and need for post-acute services based on payer coverage, community resources, and patient preferences, and barriers to discharge  - Address psychosocial, clinical, and financial barriers to discharge as identified in assessment in conjunction with the patient/family and health care team  - Arrange appropriate level of post-acute services according to patients   needs and preference and payer coverage in collaboration with the physician and health care team  - Communicate with and update the patient/family, physician, and health care team regarding progress on the discharge plan  - Arrange appropriate transportation to post-acute venues   Outcome: Progressing

## 2017-12-11 ENCOUNTER — APPOINTMENT (OUTPATIENT)
Dept: NUCLEAR MEDICINE | Facility: HOSPITAL | Age: 52
End: 2017-12-11
Payer: COMMERCIAL

## 2017-12-11 ENCOUNTER — APPOINTMENT (OUTPATIENT)
Dept: NON INVASIVE DIAGNOSTICS | Facility: HOSPITAL | Age: 52
End: 2017-12-11
Payer: COMMERCIAL

## 2017-12-11 VITALS
HEIGHT: 71 IN | OXYGEN SATURATION: 97 % | WEIGHT: 215.83 LBS | DIASTOLIC BLOOD PRESSURE: 79 MMHG | BODY MASS INDEX: 30.22 KG/M2 | HEART RATE: 75 BPM | RESPIRATION RATE: 18 BRPM | SYSTOLIC BLOOD PRESSURE: 133 MMHG | TEMPERATURE: 97.6 F

## 2017-12-11 LAB
AMPHETAMINES SERPL QL SCN: NEGATIVE
ANION GAP SERPL CALCULATED.3IONS-SCNC: 10 MMOL/L (ref 4–13)
BARBITURATES UR QL: NEGATIVE
BENZODIAZ UR QL: NEGATIVE
BUN SERPL-MCNC: 11 MG/DL (ref 5–25)
CALCIUM SERPL-MCNC: 8.6 MG/DL (ref 8.3–10.1)
CHLORIDE SERPL-SCNC: 105 MMOL/L (ref 100–108)
CO2 SERPL-SCNC: 23 MMOL/L (ref 21–32)
COCAINE UR QL: NEGATIVE
CREAT SERPL-MCNC: 0.89 MG/DL (ref 0.6–1.3)
EST. AVERAGE GLUCOSE BLD GHB EST-MCNC: 255 MG/DL
GFR SERPL CREATININE-BSD FRML MDRD: 114 ML/MIN/1.73SQ M
GLUCOSE P FAST SERPL-MCNC: 204 MG/DL (ref 65–99)
GLUCOSE SERPL-MCNC: 189 MG/DL (ref 65–140)
GLUCOSE SERPL-MCNC: 190 MG/DL (ref 65–140)
GLUCOSE SERPL-MCNC: 204 MG/DL (ref 65–140)
HBA1C MFR BLD: 10.5 % (ref 4.2–6.3)
MAX DIASTOLIC BP: 100 MMHG
MAX HEART RATE: 155 BPM
MAX PREDICTED HEART RATE: 169 BPM
MAX. SYSTOLIC BP: 230 MMHG
METHADONE UR QL: NEGATIVE
OPIATES UR QL SCN: NEGATIVE
PCP UR QL: NEGATIVE
POTASSIUM SERPL-SCNC: 3.8 MMOL/L (ref 3.5–5.3)
PROTOCOL NAME: NORMAL
SODIUM SERPL-SCNC: 138 MMOL/L (ref 136–145)
TARGET HR FORMULA: NORMAL
TEST INDICATION: NORMAL
THC UR QL: NEGATIVE
TIME IN EXERCISE PHASE: NORMAL

## 2017-12-11 PROCEDURE — 93017 CV STRESS TEST TRACING ONLY: CPT

## 2017-12-11 PROCEDURE — A9502 TC99M TETROFOSMIN: HCPCS

## 2017-12-11 PROCEDURE — 90670 PCV13 VACCINE IM: CPT | Performed by: PHYSICIAN ASSISTANT

## 2017-12-11 PROCEDURE — 83036 HEMOGLOBIN GLYCOSYLATED A1C: CPT | Performed by: INTERNAL MEDICINE

## 2017-12-11 PROCEDURE — 78452 HT MUSCLE IMAGE SPECT MULT: CPT

## 2017-12-11 PROCEDURE — 80307 DRUG TEST PRSMV CHEM ANLYZR: CPT | Performed by: INTERNAL MEDICINE

## 2017-12-11 PROCEDURE — 82948 REAGENT STRIP/BLOOD GLUCOSE: CPT

## 2017-12-11 PROCEDURE — 80048 BASIC METABOLIC PNL TOTAL CA: CPT | Performed by: INTERNAL MEDICINE

## 2017-12-11 RX ORDER — NICOTINE 21 MG/24HR
1 PATCH, TRANSDERMAL 24 HOURS TRANSDERMAL DAILY
Qty: 28 PATCH | Refills: 0 | Status: SHIPPED | OUTPATIENT
Start: 2017-12-12

## 2017-12-11 RX ORDER — ASPIRIN 81 MG/1
81 TABLET, CHEWABLE ORAL DAILY
Qty: 30 TABLET | Refills: 0 | Status: SHIPPED | OUTPATIENT
Start: 2017-12-12 | End: 2019-04-22

## 2017-12-11 RX ADMIN — ASPIRIN 81 MG 81 MG: 81 TABLET ORAL at 08:30

## 2017-12-11 RX ADMIN — NICOTINE 1 PATCH: 21 PATCH, EXTENDED RELEASE TRANSDERMAL at 09:00

## 2017-12-11 RX ADMIN — HEPARIN SODIUM 5000 UNITS: 5000 INJECTION, SOLUTION INTRAVENOUS; SUBCUTANEOUS at 05:43

## 2017-12-11 RX ADMIN — HEPARIN SODIUM 5000 UNITS: 5000 INJECTION, SOLUTION INTRAVENOUS; SUBCUTANEOUS at 13:17

## 2017-12-11 RX ADMIN — INSULIN LISPRO 2 UNITS: 100 INJECTION, SOLUTION INTRAVENOUS; SUBCUTANEOUS at 12:43

## 2017-12-11 RX ADMIN — INSULIN LISPRO 1 UNITS: 100 INJECTION, SOLUTION INTRAVENOUS; SUBCUTANEOUS at 08:30

## 2017-12-11 RX ADMIN — PNEUMOCOCCAL 13-VALENT CONJUGATE VACCINE 0.5 ML: 2.2; 2.2; 2.2; 2.2; 2.2; 4.4; 2.2; 2.2; 2.2; 2.2; 2.2; 2.2; 2.2 INJECTION, SUSPENSION INTRAMUSCULAR at 14:11

## 2017-12-11 RX ADMIN — LISINOPRIL 20 MG: 20 TABLET ORAL at 08:30

## 2017-12-11 NOTE — PROGRESS NOTES
[facility] Progress Note  Patient: Toshia Verdin 47 y o  male   MRN: [id number]  PCP: No primary care provider on file  Unit/Bed#: -Ingrid Encounter: [id number]  Date Of Visit: 12/10/17    Assessment:    Principal Problem:    Chest pain  Active Problems:    Hypertension    Hyperlipidemia    Type 2 diabetes mellitus (HCC)    Asthma    FLORENCE (acute kidney injury) ([address] )    Tobacco abuse      Plan:    · Chest Pain: with high risk features  No further since in hospital  Echo shows 55-65% Ef, No RWMA, Trace mitral valve, trace regurg, trace pulmonic regurg  · Aspirin and statin  · Send urine drug screen , not done on admit  · Diabetes: poorly controlled check hemoglobin a1c  · SS for now   · Acute kidney injury improved  Recheck in am  Stop IVF for now , taking po  Ok  · Tobacco cessation counseled  VTE Pharmacologic Prophylaxis:   Pharmacologic: Heparin  Mechanical VTE Prophylaxis in Place: Yes    Patient Centered Rounds: I have performed bedside rounds with nursing staff today  Discussions with Specialists or Other Care Team Provider: reviewed cardiology note    Education and Discussions with Family / Patient: updated spouse at bedside    Time Spent for Care: 20 minutes  More than 50% of total time spent on counseling and coordination of care as described above  Current Length of Stay: 0 day(s)    Current Patient Status: Observation   Certification Statement: The patient will continue to require additional inpatient hospital stay due to Need for testing patient high risk symptoms    Discharge Plan / Estimated Discharge Date:  With tomorrow after stress test    Code Status: Level 1 - Full Code      Subjective:   Patient states no further chest pain   Eating and drinking well       Objective:     Vitals:   Temp (24hrs), Av 9 °F (36 6 °C), Min:97 5 °F (36 4 °C), Max:98 4 °F (36 9 °C)    HR:  [79-87] 87  Resp:  [16-19] 18  BP: (110-155)/(62-89) 155/79  SpO2:  [93 %-98 %] 97 %  Body mass index is 30 1 kg/m²  Input and Output Summary (last 24 hours): Intake/Output Summary (Last 24 hours) at 12/10/17 2101  Last data filed at 12/10/17 1951   Gross per 24 hour   Intake             3665 ml   Output                0 ml   Net             3665 ml       Physical Exam:     Physical Exam  Well-developed reasonably nourished slightly overweight  male no acute distress he is normocephalic atraumatic pupils equal round reactive to light extraocular muscles intact mucous membranes moist neck is supple there is no JVD no lymph nodes no carotid bruits chest is decreased but clear to auscultation is no rhonchi rales or wheezes  Cardiovascular regular rate rhythm positive S1 and S2 no S3-S4 murmur or gallop  Abdomen is soft nontender nondistended with positive bowel sounds no hepatosplenomegaly no guarding or rebound  Neurologically awake alert oriented cranial nerves 2-12 intact psychiatrically affect is      Additional Data:     Labs:      Results from last 7 days  Lab Units 12/09/17  0002 12/08/17  1731   WBC Thousand/uL  --  8 47   HEMOGLOBIN g/dL  --  14 2   HEMATOCRIT %  --  42 8   PLATELETS Thousands/uL 208 238   NEUTROS PCT %  --  47   LYMPHS PCT %  --  41   MONOS PCT %  --  8   EOS PCT %  --  3       Results from last 7 days  Lab Units 12/09/17  0603 12/08/17  1731   SODIUM mmol/L 139 138   POTASSIUM mmol/L 3 7 3 7   CHLORIDE mmol/L 105 101   CO2 mmol/L 24 25   BUN mg/dL 14 16   CREATININE mg/dL 0 88 1 36*   CALCIUM mg/dL 7 9* 9 0   TOTAL PROTEIN g/dL  --  7 4   BILIRUBIN TOTAL mg/dL  --  0 40   ALK PHOS U/L  --  86   ALT U/L  --  35   AST U/L  --  16   GLUCOSE RANDOM mg/dL 180* 313*           * I Have Reviewed All Lab Data Listed Above    * Additional Pertinent Lab Tests Reviewed: No New Labs Available For Today    Imaging:    Imaging Reports Reviewed Today Include:  None  Imaging Personally Reviewed by Myself Includes:  None    Recent Cultures (last 7 days):           Last 24 Hours Medication List:     [START ON 12/11/2017] aspirin 81 mg Oral Daily   atorvastatin 20 mg Oral Daily With Dinner   heparin (porcine) 5,000 Units Subcutaneous Q8H [address]   insulin lispro 1-6 Units Subcutaneous TID AC   insulin lispro 1-6 Units Subcutaneous HS   lisinopril 20 mg Oral Daily   nicotine 1 patch Transdermal Daily        Today, Patient Was Seen By: April De Dios MD Pager : [phone]     ** Please Note: This note has been constructed using a voice recognition system   **

## 2017-12-11 NOTE — DISCHARGE SUMMARY
Discharge Summary - [facility] Internal Medicine    Patient Information: Jenni García 47 y o  male MRN: [id number]  Unit/Bed#: -01 Encounter: [id number]    Discharging Physician / Practitioner: Benito Hernandez MD  PCP: No primary care provider on file  Admission Date: 12/8/2017  Discharge Date: 12/11/17    Reason for Admission: Chest Pain     Discharge Diagnoses:     Principal Problem:  ·   Chest pain: Echo showed Normal ejection fraction   Stress test without abnormality  In light of recent ED visits for GI symptoms will ask patient to follow up with PCP for GI workup  UDS was negative  Active Problems:  ·   Hypertension: continue ACE inhibitor  ·   Hyperlipidemia: Not checked during this visit will request check through primary care, continue lipitor    Type 2 diabetes mellitus ([facility] [address] ): poorly controled  Continue metformin and request f/u with PCP for consideration of additional agents  ·   FLORENCE (acute kidney injury) ([facility] [address] ): resolved unclear etiology  ·   Tobacco abuse: have counseled against , will provide script for nicotine replacement        Consultations During Hospital Stay:  · Cardiology    Procedures Performed:     · Echo:  EJECTION FRACTION 55-65%  NO WALL MOTION ABNORMALITY  WALL THICKNESS WAS INCREASED  TRACE MITRAL VALVE REGURGITATION TRACE TRICUSPID VALVE REGURGITATION AND MILD PULMONIC VALVE REGURGITATION WERE NOTED  · Nucleolar stress test showed no diagnostic evidence for perfusion abnormality  L of fever function was normal with an EF of 57%    Significant Findings / Test Results:     · Hgaic: 10 5    Incidental Findings:   · None    Test Results Pending at Discharge (will require follow up): · None     Outpatient Tests Requested:  · none    Complications:  none    Hospital Course:     Jenni García is a 47 y o  male patient who originally presented to the hospital on 12/8/2017 due to central sternal chest pain radiating down the left arm with associated diaphoresis    Patient was admitted to the hospital he had no further pain throughout the hospital course is troponins were negative but his symptoms were significant enough to warrant evaluation with nuclear stress testing  His echo was within normal limits with only trace valvular disease, and a stress test also showed no ischemia  Patient will discharged home with follow-up to a primary care physician for potential GI evaluation as in looking back through his records it appears that he has had a lot of GI symptoms which may mimic cardiac symptoms similar to his presentation  Condition at Discharge: good     Discharge Day Visit / Exam:     Subjective:  Patient states he is feeling fine no new symptoms  Vitals: Blood Pressure: 133/79 (12/11/17 0700)  Pulse: 75 (12/11/17 0700)  Temperature: 97 6 °F (36 4 °C) (12/11/17 0700)  Temp Source: Oral (12/11/17 0700)  Respirations: 18 (12/11/17 0700)  Height: 5' 11" (180 3 cm) (12/08/17 1935)  Weight - Scale: 97 9 kg (215 lb 13 3 oz) (12/08/17 1935)  SpO2: 97 % (12/11/17 0700)  Exam:   Physical Exam  General well-developed mildly overweight  male no acute distress he is normocephalic atraumatic pupils equal round reactive to light extraocular muscles intact mucous membranes are moist neck is supple there is no JVD no lymph nodes no carotid bruits chest is decreased but clear to auscultation is no rhonchi rales or wheezes  Cardiovascular regular rate rhythm positive S1 and S2 no S3-S4 murmur or gallop  Abdomen is soft nontender nondistended with positive bowel sounds no hepatosplenomegaly no guarding or rebound extremities show no clubbing cyanosis edema  Neurologically is awake alert oriented cranial nerves 2-12 intact        Discharge instructions/Information to patient and family:   See after visit summary for information provided to patient and family        Provisions for Follow-Up Care:  See after visit summary for information related to follow-up care and any pertinent home health orders  Disposition:     Home    For Discharges to [facility] SNF:   · Not Applicable to this Patient - Not Applicable to this Patient    Planned Readmission:  None     Discharge Statement:  I spent 25 minutes discharging the patient  This time was spent on the day of discharge  I had direct contact with the patient on the day of discharge  Greater than 50% of the total time was spent examining patient, answering all patient questions, arranging and discussing plan of care with patient as well as directly providing post-discharge instructions  Additional time then spent on discharge activities  Discharge Medications:  See after visit summary for reconciled discharge medications provided to patient and family        ** Please Note: This note has been constructed using a voice recognition system **

## 2017-12-11 NOTE — PLAN OF CARE
Problem: DISCHARGE PLANNING - CARE MANAGEMENT  Goal: Discharge to post-acute care or home with appropriate resources  INTERVENTIONS:  - Conduct assessment to determine patient/family and health care team treatment goals, and need for post-acute services based on payer coverage, community resources, and patient preferences, and barriers to discharge  - Address psychosocial, clinical, and financial barriers to discharge as identified in assessment in conjunction with the patient/family and health care team  - Arrange appropriate level of post-acute services according to patient's   needs and preference and payer coverage in collaboration with the physician and health care team  - Communicate with and update the patient/family, physician, and health care team regarding progress on the discharge plan  - Arrange appropriate transportation to post-acute venues   Outcome: Completed Date Met: 12/11/17  CM met with pt at bedside  Pt to be discharged today with no needs  Father-in-law Tanmay Hopkins will transport home

## 2017-12-11 NOTE — SOCIAL WORK
CM met with pt at bedside  Pt to be discharged today with no needs  Father-in-law Kathleen Garcia will transport home

## 2017-12-11 NOTE — PLAN OF CARE
Problem: PAIN - ADULT  Goal: Verbalizes/displays adequate comfort level or baseline comfort level  Interventions:  - Encourage patient to monitor pain and request assistance  - Assess pain using appropriate pain scale  - Administer analgesics based on type and severity of pain and evaluate response  - Implement non-pharmacological measures as appropriate and evaluate response  - Consider cultural and social influences on pain and pain management  - Notify physician/advanced practitioner if interventions unsuccessful or patient reports new pain   Outcome: Progressing      Problem: INFECTION - ADULT  Goal: Absence or prevention of progression during hospitalization  INTERVENTIONS:  - Assess and monitor for signs and symptoms of infection  - Monitor lab/diagnostic results  - Monitor all insertion sites, i e  indwelling lines, tubes, and drains  - Monitor endotracheal (as able) and nasal secretions for changes in amount and color  - [place] appropriate cooling/warming therapies per order  - Administer medications as ordered  - Instruct and encourage patient and family to use good hand hygiene technique  - Identify and instruct in appropriate isolation precautions for identified infection/condition   Outcome: Progressing    Goal: Absence of fever/infection during neutropenic period  INTERVENTIONS:  - Monitor WBC  - Implement neutropenic guidelines   Outcome: Progressing      Problem: SAFETY ADULT  Goal: Patient will remain free of falls  INTERVENTIONS:  - Assess patient frequently for physical needs  -  Identify cognitive and physical deficits and behaviors that affect risk of falls    -  [place] fall precautions as indicated by assessment   - Educate patient/family on patient safety including physical limitations  - Instruct patient to call for assistance with activity based on assessment  - Modify environment to reduce risk of injury  - Consider OT/PT consult to assist with strengthening/mobility    Outcome: Progressing    Goal: Maintain or return to baseline ADL function  INTERVENTIONS:  -  Assess patient's ability to carry out ADLs; assess patient's baseline for ADL function and identify physical deficits which impact ability to perform ADLs (bathing, care of mouth/teeth, toileting, grooming, dressing, etc )  - Assess/evaluate cause of self-care deficits   - Assess range of motion  - Assess patient's mobility; develop plan if impaired  - Assess patient's need for assistive devices and provide as appropriate  - Encourage maximum independence but intervene and supervise when necessary  ¯ Involve family in performance of ADLs  ¯ Assess for home care needs following discharge   ¯ Request OT consult to assist with ADL evaluation and planning for discharge  ¯ Provide patient education as appropriate   Outcome: Progressing    Goal: Maintain or return mobility status to optimal level  INTERVENTIONS:  - Assess patient's baseline mobility status (ambulation, transfers, stairs, etc )    - Identify cognitive and physical deficits and behaviors that affect mobility  - Identify mobility aids required to assist with transfers and/or ambulation (gait belt, sit-to-stand, lift, walker, cane, etc )  - [place] fall precautions as indicated by assessment  - Record patient progress and toleration of activity level on Mobility SBAR; progress patient to next Phase/Stage  - Instruct patient to call for assistance with activity based on assessment  - Request Rehabilitation consult to assist with strengthening/weightbearing, etc    Outcome: Progressing      Problem: DISCHARGE PLANNING  Goal: Discharge to home or other facility with appropriate resources  INTERVENTIONS:  - Identify barriers to discharge w/patient and caregiver  - Arrange for needed discharge resources and transportation as appropriate  - Identify discharge learning needs (meds, wound care, etc )  - Arrange for interpretive services to assist at discharge as needed  - Refer to Case Management Department for coordinating discharge planning if the patient needs post-hospital services based on physician/advanced practitioner order or complex needs related to functional status, cognitive ability, or social support system   Outcome: Progressing      Problem: Knowledge Deficit  Goal: Patient/family/caregiver demonstrates understanding of disease process, treatment plan, medications, and discharge instructions  Complete learning assessment and assess knowledge base  Interventions:  - Provide teaching at level of understanding  - Provide teaching via preferred learning methods   Outcome: Progressing      Problem: CARDIOVASCULAR - ADULT  Goal: Maintains optimal cardiac output and hemodynamic stability  INTERVENTIONS:  - Monitor I/O, vital signs and rhythm  - Monitor for S/S and trends of decreased cardiac output i e  bleeding, hypotension  - Administer and titrate ordered vasoactive medications to optimize hemodynamic stability  - Assess quality of pulses, skin color and temperature  - Assess for signs of decreased coronary artery perfusion - ex  Angina  - Instruct patient to report change in severity of symptoms   Outcome: Progressing    Goal: Absence of cardiac dysrhythmias or at baseline rhythm  INTERVENTIONS:  - Continuous cardiac monitoring, monitor vital signs, obtain 12 lead EKG if indicated  - Administer antiarrhythmic and heart rate control medications as ordered  - Monitor electrolytes and administer replacement therapy as ordered   Outcome: Progressing      Problem: Potential for Falls  Goal: Patient will remain free of falls  INTERVENTIONS:  - Assess patient frequently for physical needs  -  Identify cognitive and physical deficits and behaviors that affect risk of falls    -  [place] fall precautions as indicated by assessment   - Educate patient/family on patient safety including physical limitations  - Instruct patient to call for assistance with activity based on assessment  - Modify environment to reduce risk of injury  - Consider OT/PT consult to assist with strengthening/mobility    Outcome: Progressing      Problem: DISCHARGE PLANNING - CARE MANAGEMENT  Goal: Discharge to post-acute care or home with appropriate resources  INTERVENTIONS:  - Conduct assessment to determine patient/family and health care team treatment goals, and need for post-acute services based on payer coverage, community resources, and patient preferences, and barriers to discharge  - Address psychosocial, clinical, and financial barriers to discharge as identified in assessment in conjunction with the patient/family and health care team  - Arrange appropriate level of post-acute services according to patients   needs and preference and payer coverage in collaboration with the physician and health care team  - Communicate with and update the patient/family, physician, and health care team regarding progress on the discharge plan  - Arrange appropriate transportation to post-acute venues   Outcome: Progressing

## 2018-03-31 ENCOUNTER — HOSPITAL ENCOUNTER (EMERGENCY)
Facility: HOSPITAL | Age: 53
Discharge: HOME | End: 2018-03-31
Attending: EMERGENCY MEDICINE | Admitting: EMERGENCY MEDICINE
Payer: COMMERCIAL

## 2018-03-31 ENCOUNTER — APPOINTMENT (EMERGENCY)
Dept: CT IMAGING | Facility: HOSPITAL | Age: 53
End: 2018-03-31
Payer: COMMERCIAL

## 2018-03-31 ENCOUNTER — APPOINTMENT (EMERGENCY)
Dept: RADIOLOGY | Facility: HOSPITAL | Age: 53
End: 2018-03-31
Payer: COMMERCIAL

## 2018-03-31 VITALS
OXYGEN SATURATION: 94 % | TEMPERATURE: 98.4 F | WEIGHT: 205 LBS | RESPIRATION RATE: 16 BRPM | BODY MASS INDEX: 28.59 KG/M2 | HEART RATE: 84 BPM | SYSTOLIC BLOOD PRESSURE: 130 MMHG | DIASTOLIC BLOOD PRESSURE: 88 MMHG

## 2018-03-31 DIAGNOSIS — K27.9 PEPTIC ULCER DISEASE: ICD-10-CM

## 2018-03-31 DIAGNOSIS — R10.9 ABDOMINAL PAIN: Primary | ICD-10-CM

## 2018-03-31 DIAGNOSIS — R11.0 NAUSEA: ICD-10-CM

## 2018-03-31 LAB
ALBUMIN SERPL BCP-MCNC: 3.4 G/DL (ref 3.5–5)
ALP SERPL-CCNC: 104 U/L (ref 46–116)
ALT SERPL W P-5'-P-CCNC: 84 U/L (ref 12–78)
ANION GAP SERPL CALCULATED.3IONS-SCNC: 11 MMOL/L (ref 4–13)
APTT PPP: 30 SECONDS (ref 23–35)
AST SERPL W P-5'-P-CCNC: 28 U/L (ref 5–45)
BASOPHILS # BLD AUTO: 0.01 THOUSANDS/ΜL (ref 0–0.1)
BASOPHILS NFR BLD AUTO: 0 % (ref 0–1)
BILIRUB DIRECT SERPL-MCNC: 0.11 MG/DL (ref 0–0.2)
BILIRUB SERPL-MCNC: 0.5 MG/DL (ref 0.2–1)
BUN SERPL-MCNC: 13 MG/DL (ref 5–25)
CALCIUM SERPL-MCNC: 9 MG/DL (ref 8.3–10.1)
CHLORIDE SERPL-SCNC: 100 MMOL/L (ref 100–108)
CO2 SERPL-SCNC: 25 MMOL/L (ref 21–32)
CREAT SERPL-MCNC: 1.09 MG/DL (ref 0.6–1.3)
EOSINOPHIL # BLD AUTO: 0.16 THOUSAND/ΜL (ref 0–0.61)
EOSINOPHIL NFR BLD AUTO: 3 % (ref 0–6)
ERYTHROCYTE [DISTWIDTH] IN BLOOD BY AUTOMATED COUNT: 13.4 % (ref 11.6–15.1)
GFR SERPL CREATININE-BSD FRML MDRD: 90 ML/MIN/1.73SQ M
GLUCOSE SERPL-MCNC: 228 MG/DL (ref 65–140)
HCT VFR BLD AUTO: 46.3 % (ref 36.5–49.3)
HGB BLD-MCNC: 15.4 G/DL (ref 12–17)
INR PPP: 0.99 (ref 0.86–1.16)
LACTATE SERPL-SCNC: 0.9 MMOL/L (ref 0.5–2)
LIPASE SERPL-CCNC: 104 U/L (ref 73–393)
LYMPHOCYTES # BLD AUTO: 2.53 THOUSANDS/ΜL (ref 0.6–4.47)
LYMPHOCYTES NFR BLD AUTO: 46 % (ref 14–44)
MCH RBC QN AUTO: 28.7 PG (ref 26.8–34.3)
MCHC RBC AUTO-ENTMCNC: 33.3 G/DL (ref 31.4–37.4)
MCV RBC AUTO: 86 FL (ref 82–98)
MONOCYTES # BLD AUTO: 0.44 THOUSAND/ΜL (ref 0.17–1.22)
MONOCYTES NFR BLD AUTO: 8 % (ref 4–12)
NEUTROPHILS # BLD AUTO: 2.36 THOUSANDS/ΜL (ref 1.85–7.62)
NEUTS SEG NFR BLD AUTO: 43 % (ref 43–75)
NRBC BLD AUTO-RTO: 0 /100 WBCS
PLATELET # BLD AUTO: 267 THOUSANDS/UL (ref 149–390)
PMV BLD AUTO: 9.8 FL (ref 8.9–12.7)
POTASSIUM SERPL-SCNC: 4.1 MMOL/L (ref 3.5–5.3)
PROT SERPL-MCNC: 8 G/DL (ref 6.4–8.2)
PROTHROMBIN TIME: 13.3 SECONDS (ref 12.1–14.4)
RBC # BLD AUTO: 5.36 MILLION/UL (ref 3.88–5.62)
SODIUM SERPL-SCNC: 136 MMOL/L (ref 136–145)
TROPONIN I SERPL-MCNC: <0.02 NG/ML
TSH SERPL DL<=0.05 MIU/L-ACNC: 0.6 UIU/ML (ref 0.36–3.74)
WBC # BLD AUTO: 5.52 THOUSAND/UL (ref 4.31–10.16)

## 2018-03-31 PROCEDURE — 80076 HEPATIC FUNCTION PANEL: CPT | Performed by: EMERGENCY MEDICINE

## 2018-03-31 PROCEDURE — C9113 INJ PANTOPRAZOLE SODIUM, VIA: HCPCS | Performed by: EMERGENCY MEDICINE

## 2018-03-31 PROCEDURE — 83690 ASSAY OF LIPASE: CPT | Performed by: EMERGENCY MEDICINE

## 2018-03-31 PROCEDURE — 96375 TX/PRO/DX INJ NEW DRUG ADDON: CPT

## 2018-03-31 PROCEDURE — 71046 X-RAY EXAM CHEST 2 VIEWS: CPT

## 2018-03-31 PROCEDURE — 96361 HYDRATE IV INFUSION ADD-ON: CPT

## 2018-03-31 PROCEDURE — 96374 THER/PROPH/DIAG INJ IV PUSH: CPT

## 2018-03-31 PROCEDURE — 85610 PROTHROMBIN TIME: CPT | Performed by: EMERGENCY MEDICINE

## 2018-03-31 PROCEDURE — 36415 COLL VENOUS BLD VENIPUNCTURE: CPT | Performed by: EMERGENCY MEDICINE

## 2018-03-31 PROCEDURE — 84443 ASSAY THYROID STIM HORMONE: CPT | Performed by: EMERGENCY MEDICINE

## 2018-03-31 PROCEDURE — 80048 BASIC METABOLIC PNL TOTAL CA: CPT | Performed by: EMERGENCY MEDICINE

## 2018-03-31 PROCEDURE — 83605 ASSAY OF LACTIC ACID: CPT | Performed by: EMERGENCY MEDICINE

## 2018-03-31 PROCEDURE — 84484 ASSAY OF TROPONIN QUANT: CPT | Performed by: EMERGENCY MEDICINE

## 2018-03-31 PROCEDURE — 85730 THROMBOPLASTIN TIME PARTIAL: CPT | Performed by: EMERGENCY MEDICINE

## 2018-03-31 PROCEDURE — 74177 CT ABD & PELVIS W/CONTRAST: CPT

## 2018-03-31 PROCEDURE — 85025 COMPLETE CBC W/AUTO DIFF WBC: CPT | Performed by: EMERGENCY MEDICINE

## 2018-03-31 PROCEDURE — 99284 EMERGENCY DEPT VISIT MOD MDM: CPT

## 2018-03-31 PROCEDURE — 93005 ELECTROCARDIOGRAM TRACING: CPT

## 2018-03-31 RX ORDER — PANTOPRAZOLE SODIUM 40 MG/1
40 INJECTION, POWDER, FOR SOLUTION INTRAVENOUS ONCE
Status: COMPLETED | OUTPATIENT
Start: 2018-03-31 | End: 2018-03-31

## 2018-03-31 RX ORDER — PANTOPRAZOLE SODIUM 40 MG/1
40 TABLET, DELAYED RELEASE ORAL DAILY
Qty: 30 TABLET | Refills: 0 | Status: SHIPPED | OUTPATIENT
Start: 2018-03-31 | End: 2019-04-10 | Stop reason: SDUPTHER

## 2018-03-31 RX ORDER — ONDANSETRON 4 MG/1
4 TABLET, FILM COATED ORAL EVERY 6 HOURS
Qty: 12 TABLET | Refills: 0 | Status: SHIPPED | OUTPATIENT
Start: 2018-03-31 | End: 2019-04-22

## 2018-03-31 RX ORDER — ONDANSETRON 2 MG/ML
4 INJECTION INTRAMUSCULAR; INTRAVENOUS ONCE
Status: COMPLETED | OUTPATIENT
Start: 2018-03-31 | End: 2018-03-31

## 2018-03-31 RX ORDER — SUCRALFATE 1 G/1
1 TABLET ORAL 4 TIMES DAILY
Qty: 60 TABLET | Refills: 0 | Status: SHIPPED | OUTPATIENT
Start: 2018-03-31 | End: 2019-04-22

## 2018-03-31 RX ORDER — SUCRALFATE ORAL 1 G/10ML
1000 SUSPENSION ORAL ONCE
Status: COMPLETED | OUTPATIENT
Start: 2018-03-31 | End: 2018-03-31

## 2018-03-31 RX ADMIN — ONDANSETRON 4 MG: 2 INJECTION INTRAMUSCULAR; INTRAVENOUS at 16:31

## 2018-03-31 RX ADMIN — IOHEXOL 100 ML: 350 INJECTION, SOLUTION INTRAVENOUS at 16:29

## 2018-03-31 RX ADMIN — SODIUM CHLORIDE 1000 ML: 0.9 INJECTION, SOLUTION INTRAVENOUS at 15:55

## 2018-03-31 RX ADMIN — SUCRALFATE 1000 MG: 1 SUSPENSION ORAL at 16:31

## 2018-03-31 RX ADMIN — PANTOPRAZOLE SODIUM 40 MG: 40 INJECTION, POWDER, FOR SOLUTION INTRAVENOUS at 16:31

## 2018-03-31 NOTE — DISCHARGE INSTRUCTIONS
Abdominal Pain   WHAT YOU NEED TO KNOW:   Abdominal pain can be dull, achy, or sharp  You may have pain in one area of your abdomen, or in your entire abdomen  Your pain may be caused by a condition such as constipation, food sensitivity or poisoning, infection, or a blockage  Abdominal pain can also be from a hernia, appendicitis, or an ulcer  Liver, gallbladder, or kidney conditions can also cause abdominal pain  The cause of your abdominal pain may be unknown  DISCHARGE INSTRUCTIONS:   Return to the emergency department if:   · You have new chest pain or shortness of breath  · You have pulsing pain in your upper abdomen or lower back that suddenly becomes constant  · Your pain is in the right lower abdominal area and worsens with movement  · You have a fever over 100 4°F (38°C) or shaking chills  · You are vomiting and cannot keep food or liquids down  · Your pain does not improve or gets worse over the next 8 to 12 hours  · You see blood in your vomit or bowel movements, or they look black and tarry  · Your skin or the whites of your eyes turn yellow  · You are a woman and have a large amount of vaginal bleeding that is not your monthly period  Contact your healthcare provider if:   · You have pain in your lower back  · You are a man and have pain in your testicles  · You have pain when you urinate  · You have questions or concerns about your condition or care  Follow up with your healthcare provider within 24 hours or as directed:  Write down your questions so you remember to ask them during your visits  Medicines:   · Medicines  may be given to calm your stomach and prevent vomiting or to decrease pain  Ask how to take pain medicine safely  Take your medicine as directed    Contact your healthcare provider if you think your medicine is not     Peptic Ulcer   WHAT YOU NEED TO KNOW:   A peptic ulcer is an open sore in the lining of your stomach, intestine, or esophagus  Peptic ulcers have different names, depending on their location  Gastric ulcers are peptic ulcers in the stomach  Duodenal ulcers are peptic ulcers in the intestine  A peptic ulcer in the esophagus is called an esophageal ulcer  Peptic ulcers may be a short-term or long-term problem  DISCHARGE INSTRUCTIONS:   Medicines:   · Medicines  that decrease the amount of acid made by your stomach may be given  You may also need medicines that protect your stomach lining from acid and antibiotics to treat H  pylori infection  · Take your medicine as directed  Contact your healthcare provider if you think your medicine is not helping or if you have side effects  Tell him or her if you are allergic to any medicine  Keep a list of the medicines, vitamins, and herbs you take  Include the amounts, and when and why you take them  Bring the list or the pill bottles to follow-up visits  Carry your medicine list with you in case of an emergency  Follow up with your healthcare provider as directed:  Write down your questions so you remember to ask them during your visits  Nutrition:   · Avoid carbonated drinks, alcohol, and caffeine  Caffeine is found in some coffees, teas, and sodas  It is also found in chocolate  · Do not eat foods that upset your stomach  These include spicy or acidic foods, such as oranges  · Eat small meals more often rather than big meals  An empty stomach may make your symptoms worse  Quit smoking:  If you smoke, it is never too late to quit  Smoking increases your risk of developing ulcers  Ask your healthcare provider for information if you need help quitting  Contact your healthcare provider if:   · You have a fever  · You have diarrhea or constipation  · Your stomach pain does not go away or gets worse after you take medicine  · You have questions or concerns about your condition or care    Return to the emergency department if:   · You have a fast heartbeat, fast breathing, or are too dizzy or weak to stand up  · You have severe pain in your stomach  · Your vomit looks like coffee grounds or has blood in it  · Your bowel movements are bloody or black  · You have sudden shortness of breath  © 2017 [address] [employer] Information is for End User's use only and may not be sold, redistributed or otherwise used for commercial purposes  All illustrations and images included in CareNotes® are the copyrighted property of [employer] , [employer]  or [employer]  The above information is an  only  It is not intended as medical advice for individual conditions or treatments  Talk to your doctor, nurse or pharmacist before following any medical regimen to see if it is safe and effective for you  Diet for Stomach Ulcers and Gastritis   WHAT YOU NEED TO KNOW:   A diet for stomach ulcers and gastritis is a meal plan that limits foods that irritate your stomach  Certain foods may worsen symptoms such as stomach pain, bloating, heartburn, or indigestion  DISCHARGE INSTRUCTIONS:   Foods to limit or avoid:  You may need to avoid acidic, spicy, or high-fat foods  Not all foods affect everyone the same way  You will need to learn which foods worsen your symptoms and limit those foods   The following are some foods that may worsen ulcer or gastritis symptoms:  · Beverages:      ¨ Whole milk and chocolate milk    ¨ Hot cocoa and cola    ¨ Any beverage with caffeine    ¨ Regular and decaffeinated coffee    ¨ Peppermint and spearmint tea    ¨ Green and black tea, with or without caffeine    ¨ Orange and grapefruit juices    ¨ Drinks that contain alcohol    · Spices and seasonings:      ¨ Black and red pepper    ¨ Chili powder    ¨ Mustard seed and nutmeg    · Other foods:      ¨ Dairy foods made from whole milk or cream    ¨ Chocolate    ¨ Spicy or strongly flavored cheeses, such as jalapeno or black pepper    ¨ Highly seasoned, high-fat meats, such as sausage, salami, rutledge, ham, and cold cuts    ¨ Hot chiles and peppers    ¨ Tomato products, such as tomato paste, tomato sauce, or tomato juice  Foods to include:  Eat a variety of healthy foods from all the food groups  Eat fruits, vegetables, whole grains, and fat-free or low-fat dairy foods  Whole grains include whole-wheat breads, cereals, pasta, and brown rice  Choose lean meats, poultry (chicken and turkey), fish, beans, eggs, and nuts  A healthy meal plan is low in unhealthy fats, salt, and added sugar  Healthy fats include olive oil and canola oil  Ask your dietitian for more information about a healthy diet  Other helpful guidelines:   · Do not eat right before bedtime  Stop eating at least 2 hours before bedtime  · Eat small, frequent meals  Your stomach may tolerate small, frequent meals better than large meals  © 2017 [address] [employer] Information is for End User's use only and may not be sold, redistributed or otherwise used for commercial purposes  All illustrations and images included in CareNotes® are the copyrighted property of A D A M [employer]  or Chan [employer]  The above information is an  only  It is not intended as medical advice for individual conditions or treatments  Talk to your doctor, nurse or pharmacist before following any medical regimen to see if it is safe and effective for you

## 2018-03-31 NOTE — ED PROVIDER NOTES
History  Chief Complaint   Patient presents with    Abdominal Pain     patient is c/o epigastric pain, burping, and nausea since yesterday  unable to describe pain     HPI  72-year-old [place] American male with a chief complaint of midepigastric pain  Patient states the pain started yesterday and radiated to his back  Patient states he has been nauseous however he has not vomited at all  Patient does have a history of pancreatitis approximately 6 months ago when he was incarcerated  Patient denies alcohol intake  Patient is a smoker  Patient has never been diagnosed with peptic ulcer disease  Patient is a diabetic  Patient also has a history of hypertension and hyperlipidemia  Prior to Admission Medications   Prescriptions Last Dose Informant Patient Reported? Taking? Liraglutide 18 MG/3ML SOPN   Yes Yes   Sig: Inject 1 2 mL under the skin   aspirin 81 mg chewable tablet   No Yes   Sig: Chew 1 tablet daily   atorvastatin (LIPITOR) 20 mg tablet   Yes Yes   Sig: Take 20 mg by mouth daily   lisinopril (ZESTRIL) 20 mg tablet   Yes Yes   Sig: Take 20 mg by mouth daily   metFORMIN (GLUCOPHAGE) 1000 MG tablet   Yes Yes   Sig: Take 1,000 mg by mouth 2 (two) times a day with meals  nicotine (NICODERM CQ) 21 mg/24 hr TD 24 hr patch   No No   Sig: Place 1 patch on the skin daily      Facility-Administered Medications: None       Past Medical History:   Diagnosis Date    Asthma     Diabetes mellitus ([facility] [address] )     Hyperlipidemia     Hypertension        Past Surgical History:   Procedure Laterality Date    APPENDECTOMY      teenager        Family History   Problem Relation Age of Onset    Hypertension Mother     Hypertension Father      I have reviewed and agree with the history as documented      Social History   Substance Use Topics    Smoking status: Current Every Day Smoker     Packs/day: 1 00     Types: Cigarettes    Smokeless tobacco: Never Used    Alcohol use No        Review of Systems Constitutional: Negative for chills and fever  HENT: Negative for congestion and rhinorrhea  Eyes: Negative for discharge and visual disturbance  Respiratory: Negative for shortness of breath and wheezing  Cardiovascular: Negative for chest pain and palpitations  Gastrointestinal: Positive for abdominal pain and nausea  Negative for vomiting  Positive burping   Endocrine: Negative for polydipsia and polyuria  Genitourinary: Negative for dysuria and hematuria  Musculoskeletal: Negative for arthralgias, gait problem and neck stiffness  Skin: Negative for rash and wound  Neurological: Negative for dizziness and headaches  Psychiatric/Behavioral: Negative for confusion and suicidal ideas  Physical Exam  ED Triage Vitals [03/31/18 1444]   Temperature Pulse Respirations Blood Pressure SpO2   98 4 °F (36 9 °C) 97 16 (!) 150/105 97 %      Temp Source Heart Rate Source Patient Position - Orthostatic VS BP Location FiO2 (%)   Oral Monitor Sitting Right arm --      Pain Score       7           Orthostatic Vital Signs  Vitals:    03/31/18 1444 03/31/18 1537   BP: (!) 150/105 130/88   Pulse: 97 84   Patient Position - Orthostatic VS: Sitting Lying       Physical Exam   Constitutional: He is oriented to person, place, and time  He appears well-developed and well-nourished  60-year-old  male lying on the stretcher in some distress with midepigastric pain  HENT:   Head: Normocephalic and atraumatic  Mouth/Throat: Oropharynx is clear and moist    Eyes: EOM are normal  Pupils are equal, round, and reactive to light  Neck: Normal range of motion  Neck supple  Cardiovascular: Normal rate, regular rhythm and normal heart sounds  Pulmonary/Chest: Breath sounds normal  No respiratory distress  He has no wheezes  He exhibits no tenderness  Abdominal: Soft  Bowel sounds are normal  He exhibits no distension  There is tenderness  There is no rebound and no guarding  Musculoskeletal: Normal range of motion  Neurological: He is alert and oriented to person, place, and time  No cranial nerve deficit  He exhibits normal muscle tone  Coordination normal    Skin: Skin is warm and dry  Psychiatric: He has a normal mood and affect  Nursing note and vitals reviewed  ED Medications  Medications   sodium chloride 0 9 % bolus 1,000 mL (1,000 mL Intravenous Not Given 3/31/18 1655)   sodium chloride 0 9 % bolus 1,000 mL (0 mL Intravenous Stopped 3/31/18 1655)   ondansetron (ZOFRAN) injection 4 mg (4 mg Intravenous Given 3/31/18 1631)   sucralfate (CARAFATE) oral suspension 1,000 mg (1,000 mg Oral Given 3/31/18 1631)   pantoprazole (PROTONIX) injection 40 mg (40 mg Intravenous Given 3/31/18 1631)   iohexol (OMNIPAQUE) 350 MG/ML injection (MULTI-DOSE) 100 mL (100 mL Intravenous Given 3/31/18 1629)       Diagnostic Studies  Results Reviewed     Procedure Component Value Units Date/Time    Hepatic function panel [[id number]]  (Abnormal) Collected:  03/31/18 1533    Lab Status:  Final result Specimen:  Blood from Arm, Right Updated:  03/31/18 1610     Total Bilirubin 0 50 mg/dL      Bilirubin, Direct 0 11 mg/dL      Alkaline Phosphatase 104 U/L      AST 28 U/L      ALT 84 (H) U/L      Total Protein 8 0 g/dL      Albumin 3 4 (L) g/dL     TSH [[id number]]  (Normal) Collected:  03/31/18 1533    Lab Status:  Final result Specimen:  Blood from Arm, Right Updated:  03/31/18 1610     TSH 3RD GENERATON 0 602 uIU/mL     Narrative:         Patients undergoing fluorescein dye angiography may retain small amounts of fluorescein in the body for 48-72 hours post procedure  Samples containing fluorescein can produce falsely depressed TSH values  If the patient had this procedure,a specimen should be resubmitted post fluorescein clearance      Lipase [[id number]]  (Normal) Collected:  03/31/18 1533    Lab Status:  Final result Specimen:  Blood from Arm, Right Updated:  03/31/18 1610     Lipase 104 u/L Lactic acid, plasma [[id number]]  (Normal) Collected:  03/31/18 1536    Lab Status:  Final result Specimen:  Blood from Arm, Right Updated:  03/31/18 1607     LACTIC ACID 0 9 mmol/L     Narrative:         Result may be elevated if tourniquet was used during collection  Troponin I [[id number]]  (Normal) Collected:  03/31/18 1533    Lab Status:  Final result Specimen:  Blood from Arm, Right Updated:  03/31/18 1605     Troponin I <0 02 ng/mL     Narrative:         [employer] Chemistry analyzer 99% cutoff is > 0 04 ng/mL in network labs    o cTnI 99% cutoff is useful only when applied to patients in the clinical setting of myocardial ischemia  o cTnI 99% cutoff should be interpreted in the context of clinical history, ECG findings and possibly cardiac imaging to establish correct diagnosis  o cTnI 99% cutoff may be suggestive but clearly not indicative of a coronary event without the clinical setting of myocardial ischemia  Basic metabolic panel [[id number]]  (Abnormal) Collected:  03/31/18 1533    Lab Status:  Final result Specimen:  Blood from Arm, Right Updated:  03/31/18 1602     Sodium 136 mmol/L      Potassium 4 1 mmol/L      Chloride 100 mmol/L      CO2 25 mmol/L      Anion Gap 11 mmol/L      BUN 13 mg/dL      Creatinine 1 09 mg/dL      Glucose 228 (H) mg/dL      Calcium 9 0 mg/dL      eGFR 90 ml/min/1 73sq m     Narrative:         National Kidney Disease Education Program recommendations are as follows:  GFR calculation is accurate only with a steady state creatinine  Chronic Kidney disease less than 60 ml/min/1 73 sq  meters  Kidney failure less than 15 ml/min/1 73 sq  meters      [facility] [[id number]]  (Normal) Collected:  03/31/18 1536    Lab Status:  Final result Specimen:  Blood from Arm, Right Updated:  03/31/18 1557     Protime 13 3 seconds      INR 0 99    APTT [[id number]]  (Normal) Collected:  03/31/18 1536    Lab Status:  Final result Specimen:  Blood from Arm, Right Updated:  03/31/18 1557     PTT 30 seconds     Narrative: Therapeutic Heparin Range = 60-90 seconds    CBC and differential [[id number]]  (Abnormal) Collected:  03/31/18 1533    Lab Status:  Final result Specimen:  Blood from Arm, Right Updated:  03/31/18 1544     WBC 5 52 Thousand/uL      RBC 5 36 Million/uL      Hemoglobin 15 4 g/dL      Hematocrit 46 3 %      MCV 86 fL      MCH 28 7 pg      MCHC 33 3 g/dL      RDW 13 4 %      MPV 9 8 fL      Platelets 626 Thousands/uL      nRBC 0 /100 WBCs      Neutrophils Relative 43 %      Lymphocytes Relative 46 (H) %      Monocytes Relative 8 %      Eosinophils Relative 3 %      Basophils Relative 0 %      Neutrophils Absolute 2 36 Thousands/µL      Lymphocytes Absolute 2 53 Thousands/µL      Monocytes Absolute 0 44 Thousand/µL      Eosinophils Absolute 0 16 Thousand/µL      Basophils Absolute 0 01 Thousands/µL     Rapid drug screen, urine [[id number]]     Lab Status:  No result Specimen:  Urine     UA w Reflex to Microscopic w Reflex to Culture [[id number]]     Lab Status:  No result Specimen:  Urine                  CT abdomen pelvis with contrast   Final Result by Cj Salas MD (03/31 1640)   1  No acute intra-abdominal pathology  Workstation performed: [id number]         XR chest 2 views   ED Interpretation by May Solorzano DO (03/31 1607)   NAD                 Procedures  ECG 12 Lead Documentation  Date/Time: 3/31/2018 3:59 PM  Performed by: Jose J Jang  Authorized by: Jose J Jang     ECG reviewed by me, the ED Provider: yes    Patient location:  ED  Interpretation:     Interpretation: normal    Rate:     ECG rate assessment: tachycardic    Rhythm:     Rhythm: sinus rhythm    ST segments:     ST segments:  Normal           Phone Contacts  ED Phone Contact    ED Course  ED Course       4:15 p m :  I re-evaluated patient  Patient is feeling much better  States the pain is gone  Patient is sitting comfortably on the stretcher    I reviewed his EKG and his lab work with the patient  His blood sugar was high in the 200s, 228 and I ordered some IV fluids as I await the CT results  4:45 p m :  I reviewed the CT results with the patient that were basically normal  Patient still very comfortable on the stretcher  I gave him a copy of his CT results and I will place patient on Protonix Carafate and Zofran as needed  Dr Nancie Murphy the gastroenterologist was given as referral for the patient to follow up  MDM  CritCare Time     Differential diagnosis includes:  1  Abdominal pain  2  Midepigastric abdominal pain  3  Pancreatitis  4  Peptic ulcer disease  5  Gastritis  6  Rule out cholecystitis    Disposition  Final diagnoses:   Abdominal pain - Mid-epigastric pain   Peptic ulcer disease - vs Gastritis   Nausea     Time reflects when diagnosis was documented in both MDM as applicable and the Disposition within this note     Time User Action Codes Description Comment    3/31/2018  4:34 PM Al Lions Add [R10 9] Abdominal pain     3/31/2018  4:35 PM Al Lions Modify [R10 9] Abdominal pain Mid-epigastric pain    3/31/2018  4:44 PM Al Lions Add [K27 9] Peptic ulcer disease     3/31/2018  4:44 PM Al Lions Modify [K27 9] Peptic ulcer disease vs Gastritis    3/31/2018  4:47 PM Al Lions Add [R11 0] Nausea       ED Disposition     ED Disposition Condition Comment    Discharge  Jenni García discharge to home/self care      Condition at discharge: Good        Follow-up Information     Follow up With Specialties Details Why Contact Info    Caron Lee MD Gastroenterology In 1 week  [place] [phone]      Mamadou Helm MD Internal Medicine In 1 week  [address] [place] [phone]          Patient's Medications   Discharge Prescriptions    ONDANSETRON (ZOFRAN) 4 MG TABLET    Take 1 tablet (4 mg total) by mouth every 6 (six) hours For nausea or vomiting       Start Date: 3/31/2018 End Date: --       Order Dose: 4 mg       Quantity: 12 tablet    Refills: 0    PANTOPRAZOLE (PROTONIX) 40 MG TABLET    Take 1 tablet (40 mg total) by mouth daily       Start Date: 3/31/2018 End Date: --       Order Dose: 40 mg       Quantity: 30 tablet    Refills: 0    SUCRALFATE (CARAFATE) 1 G TABLET    Take 1 tablet (1 g total) by mouth 4 (four) times a day       Start Date: 3/31/2018 End Date: --       Order Dose: 1 g       Quantity: 60 tablet    Refills: 0     No discharge procedures on file      ED Provider  Electronically Signed by           Cecil Morton DO  03/31/18 2244

## 2018-04-02 LAB
ATRIAL RATE: 82 BPM
P AXIS: 69 DEGREES
PR INTERVAL: 168 MS
QRS AXIS: -21 DEGREES
QRSD INTERVAL: 104 MS
QT INTERVAL: 356 MS
QTC INTERVAL: 415 MS
T WAVE AXIS: 58 DEGREES
VENTRICULAR RATE: 82 BPM

## 2018-04-02 PROCEDURE — 93010 ELECTROCARDIOGRAM REPORT: CPT | Performed by: INTERNAL MEDICINE

## 2018-07-17 ENCOUNTER — HOSPITAL ENCOUNTER (EMERGENCY)
Facility: HOSPITAL | Age: 53
Discharge: HOME | End: 2018-07-17
Admitting: EMERGENCY MEDICINE
Payer: COMMERCIAL

## 2018-07-17 ENCOUNTER — APPOINTMENT (EMERGENCY)
Dept: RADIOLOGY | Facility: HOSPITAL | Age: 53
End: 2018-07-17
Payer: COMMERCIAL

## 2018-07-17 VITALS
DIASTOLIC BLOOD PRESSURE: 86 MMHG | WEIGHT: 205.03 LBS | OXYGEN SATURATION: 97 % | SYSTOLIC BLOOD PRESSURE: 146 MMHG | RESPIRATION RATE: 17 BRPM | BODY MASS INDEX: 28.7 KG/M2 | HEART RATE: 82 BPM | HEIGHT: 71 IN

## 2018-07-17 DIAGNOSIS — R07.9 CHEST PAIN: Primary | ICD-10-CM

## 2018-07-17 LAB
ALBUMIN SERPL BCP-MCNC: 3.8 G/DL (ref 3.5–5)
ALP SERPL-CCNC: 79 U/L (ref 46–116)
ALT SERPL W P-5'-P-CCNC: 25 U/L (ref 12–78)
ANION GAP SERPL CALCULATED.3IONS-SCNC: 12 MMOL/L (ref 4–13)
AST SERPL W P-5'-P-CCNC: 8 U/L (ref 5–45)
BASOPHILS # BLD AUTO: 0.03 THOUSANDS/ΜL (ref 0–0.1)
BASOPHILS NFR BLD AUTO: 0 % (ref 0–1)
BILIRUB SERPL-MCNC: 0.4 MG/DL (ref 0.2–1)
BUN SERPL-MCNC: 15 MG/DL (ref 5–25)
CALCIUM SERPL-MCNC: 8.9 MG/DL (ref 8.3–10.1)
CHLORIDE SERPL-SCNC: 101 MMOL/L (ref 100–108)
CO2 SERPL-SCNC: 24 MMOL/L (ref 21–32)
CREAT SERPL-MCNC: 1.03 MG/DL (ref 0.6–1.3)
EOSINOPHIL # BLD AUTO: 0.22 THOUSAND/ΜL (ref 0–0.61)
EOSINOPHIL NFR BLD AUTO: 3 % (ref 0–6)
ERYTHROCYTE [DISTWIDTH] IN BLOOD BY AUTOMATED COUNT: 14.1 % (ref 11.6–15.1)
GFR SERPL CREATININE-BSD FRML MDRD: 97 ML/MIN/1.73SQ M
GLUCOSE SERPL-MCNC: 243 MG/DL (ref 65–140)
HCT VFR BLD AUTO: 49.6 % (ref 36.5–49.3)
HGB BLD-MCNC: 16.5 G/DL (ref 12–17)
IMM GRANULOCYTES # BLD AUTO: 0.02 THOUSAND/UL (ref 0–0.2)
IMM GRANULOCYTES NFR BLD AUTO: 0 % (ref 0–2)
LYMPHOCYTES # BLD AUTO: 4.39 THOUSANDS/ΜL (ref 0.6–4.47)
LYMPHOCYTES NFR BLD AUTO: 52 % (ref 14–44)
MCH RBC QN AUTO: 29 PG (ref 26.8–34.3)
MCHC RBC AUTO-ENTMCNC: 33.3 G/DL (ref 31.4–37.4)
MCV RBC AUTO: 87 FL (ref 82–98)
MONOCYTES # BLD AUTO: 0.8 THOUSAND/ΜL (ref 0.17–1.22)
MONOCYTES NFR BLD AUTO: 10 % (ref 4–12)
NEUTROPHILS # BLD AUTO: 2.98 THOUSANDS/ΜL (ref 1.85–7.62)
NEUTS SEG NFR BLD AUTO: 35 % (ref 43–75)
NRBC BLD AUTO-RTO: 0 /100 WBCS
PLATELET # BLD AUTO: 283 THOUSANDS/UL (ref 149–390)
PMV BLD AUTO: 9.3 FL (ref 8.9–12.7)
POTASSIUM SERPL-SCNC: 4.2 MMOL/L (ref 3.5–5.3)
PROT SERPL-MCNC: 8.3 G/DL (ref 6.4–8.2)
RBC # BLD AUTO: 5.68 MILLION/UL (ref 3.88–5.62)
SODIUM SERPL-SCNC: 137 MMOL/L (ref 136–145)
TROPONIN I SERPL-MCNC: 0.02 NG/ML
TROPONIN I SERPL-MCNC: <0.02 NG/ML
WBC # BLD AUTO: 8.44 THOUSAND/UL (ref 4.31–10.16)

## 2018-07-17 PROCEDURE — 71046 X-RAY EXAM CHEST 2 VIEWS: CPT

## 2018-07-17 PROCEDURE — 36415 COLL VENOUS BLD VENIPUNCTURE: CPT

## 2018-07-17 PROCEDURE — 85025 COMPLETE CBC W/AUTO DIFF WBC: CPT

## 2018-07-17 PROCEDURE — 84484 ASSAY OF TROPONIN QUANT: CPT

## 2018-07-17 PROCEDURE — 84484 ASSAY OF TROPONIN QUANT: CPT | Performed by: NURSE PRACTITIONER

## 2018-07-17 PROCEDURE — 93005 ELECTROCARDIOGRAM TRACING: CPT

## 2018-07-17 PROCEDURE — 99285 EMERGENCY DEPT VISIT HI MDM: CPT

## 2018-07-17 PROCEDURE — 80053 COMPREHEN METABOLIC PANEL: CPT

## 2018-07-17 RX ORDER — ASPIRIN 81 MG/1
324 TABLET, CHEWABLE ORAL ONCE
Status: COMPLETED | OUTPATIENT
Start: 2018-07-17 | End: 2018-07-17

## 2018-07-17 RX ADMIN — ASPIRIN 81 MG CHEWABLE TABLET 324 MG: 81 TABLET CHEWABLE at 11:34

## 2018-07-17 NOTE — DISCHARGE INSTRUCTIONS

## 2018-07-17 NOTE — ED PROVIDER NOTES
History  Chief Complaint   Patient presents with    Chest Pain     Pt with chest pain since about 8am this morning, in the left side of his chest radiates to his back  Pt also with a dry non productive cough      60-year-old male patient presents here with a chief complaint of chest pain  His chest pain involve the left side of his chest and wrapped around to his back and was associated with some tingling in his arm  Started around 8:00 a m  lasted for about an hour  He denies nausea vomiting or diaphoresis at the time  He had no other symptoms  The onset was nonexertional in nature  He has had a previous episode like this probably about a year ago  Prior to Admission Medications   Prescriptions Last Dose Informant Patient Reported? Taking? Liraglutide 18 MG/3ML SOPN   Yes No   Sig: Inject 1 2 mL under the skin   aspirin 81 mg chewable tablet   No No   Sig: Chew 1 tablet daily   atorvastatin (LIPITOR) 20 mg tablet   Yes No   Sig: Take 20 mg by mouth daily   lisinopril (ZESTRIL) 20 mg tablet   Yes No   Sig: Take 20 mg by mouth daily   metFORMIN (GLUCOPHAGE) 1000 MG tablet   Yes No   Sig: Take 1,000 mg by mouth 2 (two) times a day with meals     nicotine (NICODERM CQ) 21 mg/24 hr TD 24 hr patch   No No   Sig: Place 1 patch on the skin daily   ondansetron (ZOFRAN) 4 mg tablet   No No   Sig: Take 1 tablet (4 mg total) by mouth every 6 (six) hours For nausea or vomiting   pantoprazole (PROTONIX) 40 mg tablet   No No   Sig: Take 1 tablet (40 mg total) by mouth daily   sucralfate (CARAFATE) 1 g tablet   No No   Sig: Take 1 tablet (1 g total) by mouth 4 (four) times a day      Facility-Administered Medications: None       Past Medical History:   Diagnosis Date    Asthma     Diabetes mellitus ([address] )     Hyperlipidemia     Hypertension        Past Surgical History:   Procedure Laterality Date    APPENDECTOMY      teenager        Family History   Problem Relation Age of Onset    Hypertension Mother  Hypertension Father      I have reviewed and agree with the history as documented  Social History   Substance Use Topics    Smoking status: Current Every Day Smoker     Packs/day: 1 00     Types: Cigarettes    Smokeless tobacco: Never Used    Alcohol use No        Review of Systems   Constitutional: Negative for diaphoresis, fatigue and fever  HENT: Negative for congestion, ear pain, nosebleeds and sore throat  Eyes: Negative for photophobia, pain, discharge and visual disturbance  Respiratory: Negative for cough, choking, chest tightness, shortness of breath and wheezing  Cardiovascular: Positive for chest pain  Negative for palpitations  Gastrointestinal: Negative for abdominal distention, abdominal pain, diarrhea and vomiting  Genitourinary: Negative for dysuria, flank pain and frequency  Musculoskeletal: Negative for back pain, gait problem and joint swelling  Skin: Negative for color change and rash  Neurological: Negative for dizziness, syncope and headaches  Psychiatric/Behavioral: Negative for behavioral problems and confusion  The patient is not nervous/anxious  All other systems reviewed and are negative  Physical Exam  Physical Exam   Constitutional: He is oriented to person, place, and time  He appears well-developed and well-nourished  HENT:   Head: Normocephalic and atraumatic  Eyes: Pupils are equal, round, and reactive to light  Neck: Normal range of motion  Neck supple  Cardiovascular: Normal rate, regular rhythm, normal heart sounds and normal pulses  PMI is not displaced  Pulmonary/Chest: Effort normal and breath sounds normal  No respiratory distress  Abdominal: Soft  He exhibits no distension  There is no guarding  Musculoskeletal: Normal range of motion  Lymphadenopathy:     He has no cervical adenopathy  Neurological: He is alert and oriented to person, place, and time  Skin: Skin is warm and dry  No rash noted   He is not diaphoretic  No pallor  Psychiatric: He has a normal mood and affect  Vitals reviewed  Vital Signs  ED Triage Vitals [07/17/18 1038]   Temp Pulse Respirations Blood Pressure SpO2   -- 82 16 138/95 98 %      Temp src Heart Rate Source Patient Position - Orthostatic VS BP Location FiO2 (%)   -- Monitor Sitting Right arm --      Pain Score       6           Vitals:    07/17/18 1038 07/17/18 1221 07/17/18 1230   BP: 138/95 146/86 146/86   Pulse: 82 75 82   Patient Position - Orthostatic VS: Sitting Lying Lying       Visual Acuity      ED Medications  Medications   aspirin chewable tablet 324 mg (324 mg Oral Given 7/17/18 1134)       Diagnostic Studies  Results Reviewed     Procedure Component Value Units Date/Time    Troponin I repeat in 3 hrs [[id number]]  (Normal) Collected:  07/17/18 1322    Lab Status:  Final result Specimen:  Blood from Arm, Left Updated:  07/17/18 1353     Troponin I 0 02 ng/mL     Troponin I [[id number]]  (Normal) Collected:  07/17/18 1044    Lab Status:  Final result Specimen:  Blood from Arm, Left Updated:  07/17/18 1109     Troponin I <0 02 ng/mL     Comprehensive metabolic panel [[id number]]  (Abnormal) Collected:  07/17/18 1044    Lab Status:  Final result Specimen:  Blood from Arm, Left Updated:  07/17/18 1108     Sodium 137 mmol/L      Potassium 4 2 mmol/L      Chloride 101 mmol/L      CO2 24 mmol/L      Anion Gap 12 mmol/L      BUN 15 mg/dL      Creatinine 1 03 mg/dL      Glucose 243 (H) mg/dL      Calcium 8 9 mg/dL      AST 8 U/L      ALT 25 U/L      Alkaline Phosphatase 79 U/L      Total Protein 8 3 (H) g/dL      Albumin 3 8 g/dL      Total Bilirubin 0 40 mg/dL      eGFR 97 ml/min/1 73sq m     Narrative:         National Kidney Disease Education Program recommendations are as follows:  GFR calculation is accurate only with a steady state creatinine  Chronic Kidney disease less than 60 ml/min/1 73 sq  meters  Kidney failure less than 15 ml/min/1 73 sq  meters      CBC and differential [[id number]]  (Abnormal) Collected:  07/17/18 1044    Lab Status:  Final result Specimen:  Blood from Arm, Left Updated:  07/17/18 1052     WBC 8 44 Thousand/uL      RBC 5 68 (H) Million/uL      Hemoglobin 16 5 g/dL      Hematocrit 49 6 (H) %      MCV 87 fL      MCH 29 0 pg      MCHC 33 3 g/dL      RDW 14 1 %      MPV 9 3 fL      Platelets 089 Thousands/uL      nRBC 0 /100 WBCs      Neutrophils Relative 35 (L) %      Immat GRANS % 0 %      Lymphocytes Relative 52 (H) %      Monocytes Relative 10 %      Eosinophils Relative 3 %      Basophils Relative 0 %      Neutrophils Absolute 2 98 Thousands/µL      Immature Grans Absolute 0 02 Thousand/uL      Lymphocytes Absolute 4 39 Thousands/µL      Monocytes Absolute 0 80 Thousand/µL      Eosinophils Absolute 0 22 Thousand/µL      Basophils Absolute 0 03 Thousands/µL                  X-ray chest 2 views   Final Result by Sandra Cm DO (07/17 1352)   No consolidation or effusion  Workstation performed: [id number]                    Procedures  Procedures       Phone Contacts  ED Phone Contact    ED Course         HEART Risk Score      Most Recent Value   History  0 Filed at: 07/19/2018 1844   ECG  0 Filed at: 07/19/2018 1844   Age  2 Filed at: 07/19/2018 1844   Risk Factors  1 Filed at: 07/19/2018 1844   Troponin  0 Filed at: 07/19/2018 1844   Heart Score Risk Calculator   History  0 Filed at: 07/19/2018 1844   ECG  0 Filed at: 07/19/2018 1844   Age  2 Filed at: 07/19/2018 1844   Risk Factors  1 Filed at: 07/19/2018 1844   Troponin  0 Filed at: 07/19/2018 1844   HEART Score  3 Filed at: 07/19/2018 1844   HEART Score  3 Filed at: 07/19/2018 1844                            MDM  Number of Diagnoses or Management Options  Chest pain: new and requires workup  Diagnosis management comments: Low risk heart score  Asymptomatic  Return precautions discussed with the patient    Outpatient stress test prescription provided       Amount and/or Complexity of Data Reviewed  Clinical lab tests: reviewed and ordered  Tests in the radiology section of CPT®: reviewed and ordered  Tests in the medicine section of CPT®: reviewed and ordered  Independent visualization of images, tracings, or specimens: yes    Patient Progress  Patient progress: stable    CritCare Time    Disposition  Final diagnoses:   Chest pain     Time reflects when diagnosis was documented in both MDM as applicable and the Disposition within this note     Time User Action Codes Description Comment    7/17/2018  2:17 PM Joel Samson Add [R07 9] Chest pain       ED Disposition     ED Disposition Condition Comment    Discharge  Landon Flanagan discharge to home/self care  Condition at discharge: Good        Follow-up Information     Follow up With Specialties Details Why Contact Info    Leah Sal MD Internal Medicine In 2 days For Continued Evaluation [address]  [phone]            Discharge Medication List as of 7/17/2018  2:21 PM      CONTINUE these medications which have NOT CHANGED    Details   aspirin 81 mg chewable tablet Chew 1 tablet daily, Starting Tue 12/12/2017, Print      atorvastatin (LIPITOR) 20 mg tablet Take 20 mg by mouth daily, Historical Med      Liraglutide 18 MG/3ML SOPN Inject 1 2 mL under the skin, Historical Med      lisinopril (ZESTRIL) 20 mg tablet Take 20 mg by mouth daily, Historical Med      metFORMIN (GLUCOPHAGE) 1000 MG tablet Take 1,000 mg by mouth 2 (two) times a day with meals  , Until Discontinued, Historical Med      nicotine (NICODERM CQ) 21 mg/24 hr TD 24 hr patch Place 1 patch on the skin daily, Starting Tue 12/12/2017, Print      ondansetron (ZOFRAN) 4 mg tablet Take 1 tablet (4 mg total) by mouth every 6 (six) hours For nausea or vomiting, Starting Sat 3/31/2018, Print      pantoprazole (PROTONIX) 40 mg tablet Take 1 tablet (40 mg total) by mouth daily, Starting Sat 3/31/2018, Print      sucralfate (CARAFATE) 1 g tablet Take 1 tablet (1 g total) by mouth 4 (four) times a day, Starting Sat 3/31/2018, Print             Outpatient Discharge Orders  Echo stress test w contrast if indicated   Standing Status: Future  Standing Exp   Date: 08/17/18         ED Provider  Electronically Signed by           LONNIE Barber  07/19/18 1457

## 2018-07-18 LAB
ATRIAL RATE: 80 BPM
ATRIAL RATE: 80 BPM
P AXIS: 48 DEGREES
P AXIS: 65 DEGREES
PR INTERVAL: 162 MS
PR INTERVAL: 164 MS
QRS AXIS: -31 DEGREES
QRS AXIS: 2 DEGREES
QRSD INTERVAL: 84 MS
QRSD INTERVAL: 92 MS
QT INTERVAL: 356 MS
QT INTERVAL: 370 MS
QTC INTERVAL: 410 MS
QTC INTERVAL: 426 MS
T WAVE AXIS: 33 DEGREES
T WAVE AXIS: 45 DEGREES
VENTRICULAR RATE: 80 BPM
VENTRICULAR RATE: 80 BPM

## 2018-07-18 PROCEDURE — 93010 ELECTROCARDIOGRAM REPORT: CPT | Performed by: INTERNAL MEDICINE

## 2018-07-22 ENCOUNTER — HOSPITAL ENCOUNTER (EMERGENCY)
Facility: HOSPITAL | Age: 53
Discharge: HOME | End: 2018-07-22
Admitting: EMERGENCY MEDICINE
Payer: COMMERCIAL

## 2018-07-22 ENCOUNTER — APPOINTMENT (EMERGENCY)
Dept: RADIOLOGY | Facility: HOSPITAL | Age: 53
End: 2018-07-22
Payer: COMMERCIAL

## 2018-07-22 VITALS
SYSTOLIC BLOOD PRESSURE: 148 MMHG | RESPIRATION RATE: 18 BRPM | WEIGHT: 205.03 LBS | HEART RATE: 90 BPM | OXYGEN SATURATION: 95 % | BODY MASS INDEX: 28.7 KG/M2 | HEIGHT: 71 IN | TEMPERATURE: 98.8 F | DIASTOLIC BLOOD PRESSURE: 72 MMHG

## 2018-07-22 DIAGNOSIS — J20.9 ACUTE BRONCHITIS, UNSPECIFIED ORGANISM: Primary | ICD-10-CM

## 2018-07-22 PROCEDURE — 99283 EMERGENCY DEPT VISIT LOW MDM: CPT

## 2018-07-22 PROCEDURE — 94640 AIRWAY INHALATION TREATMENT: CPT

## 2018-07-22 PROCEDURE — 71046 X-RAY EXAM CHEST 2 VIEWS: CPT

## 2018-07-22 RX ORDER — AZITHROMYCIN 250 MG/1
TABLET, FILM COATED ORAL
Qty: 6 TABLET | Refills: 0 | Status: SHIPPED | OUTPATIENT
Start: 2018-07-22 | End: 2018-07-27

## 2018-07-22 RX ORDER — DEXTROMETHORPHAN HYDROBROMIDE AND PROMETHAZINE HYDROCHLORIDE 15; 6.25 MG/5ML; MG/5ML
5 SYRUP ORAL 4 TIMES DAILY PRN
Qty: 118 ML | Refills: 0 | Status: SHIPPED | OUTPATIENT
Start: 2018-07-22 | End: 2018-07-27

## 2018-07-22 RX ORDER — ALBUTEROL SULFATE 90 UG/1
2 AEROSOL, METERED RESPIRATORY (INHALATION) EVERY 6 HOURS PRN
Qty: 1 INHALER | Refills: 0 | Status: SHIPPED | OUTPATIENT
Start: 2018-07-22 | End: 2018-08-01

## 2018-07-22 RX ORDER — PREDNISONE 20 MG/1
60 TABLET ORAL DAILY
Qty: 15 TABLET | Refills: 0 | Status: SHIPPED | OUTPATIENT
Start: 2018-07-22 | End: 2018-07-27

## 2018-07-22 RX ORDER — PREDNISONE 20 MG/1
60 TABLET ORAL ONCE
Status: COMPLETED | OUTPATIENT
Start: 2018-07-22 | End: 2018-07-22

## 2018-07-22 RX ORDER — ALBUTEROL SULFATE 2.5 MG/3ML
5 SOLUTION RESPIRATORY (INHALATION) ONCE
Status: COMPLETED | OUTPATIENT
Start: 2018-07-22 | End: 2018-07-22

## 2018-07-22 RX ADMIN — ALBUTEROL SULFATE 5 MG: 2.5 SOLUTION RESPIRATORY (INHALATION) at 12:26

## 2018-07-22 RX ADMIN — IPRATROPIUM BROMIDE 0.5 MG: 0.5 SOLUTION RESPIRATORY (INHALATION) at 12:26

## 2018-07-22 RX ADMIN — PREDNISONE 60 MG: 20 TABLET ORAL at 12:25

## 2018-07-22 RX ADMIN — HYDROCODONE BITARTRATE AND HOMATROPINE METHYLBROMIDE 5 ML: 5; 1.5 SOLUTION ORAL at 12:28

## 2018-07-22 NOTE — DISCHARGE INSTRUCTIONS
Acute Bronchitis   WHAT YOU SHOULD KNOW:   Acute bronchitis is swelling and irritation in the air passages of your lungs  This irritation may cause you to cough or have other breathing problems  Acute bronchitis often starts because of another viral illness, such as a cold or the flu  The illness spreads from your nose and throat to your windpipe and airways  Bronchitis is often called a chest cold  Acute bronchitis lasts about 2 weeks and is usually not a serious illness  AFTER YOU LEAVE:   Medicines:   · Ibuprofen or acetaminophen:  These medicines help lower a fever  They are available without a doctor's order  Ask your healthcare provider which medicine is right for you  Ask how much to take and how often to take it  Follow directions  These medicines can cause stomach bleeding if not taken correctly  Ibuprofen can cause kidney damage  Do not take ibuprofen if you have kidney disease, an ulcer, or allergies to aspirin  Acetaminophen can cause liver damage  Do not drink alcohol if you take acetaminophen  · Cough medicine: This medicine helps loosen mucus in your lungs and make it easier to cough up  This can help you breathe easier  · Inhalers: You may need one or more inhalers to help you breathe easier and cough less  An inhaler gives your medicine in a mist form so that you can breathe it into your lungs  Ask your healthcare provider to show you how to use your inhaler correctly  · Steroid medicine:  Steroid medicine helps open your air passages so you can breathe easier  · Take your medicine as directed  Call your healthcare provider if you think your medicine is not helping or if you have side effects  Tell him if you are allergic to any medicine  Keep a list of the medicines, vitamins, and herbs you take  Include the amounts, and when and why you take them  Bring the list or the pill bottles to follow-up visits  Carry your medicine list with you in case of an emergency    How to use an inhaler:   · Shake the inhaler well to make sure you get the correct amount of medicine per puff  Remove the cover from your inhaler's mouthpiece  If you are using a spacer, connect your inhaler to the flat end of the spacer  · Exhale as much air from your lungs as you can  Put the mouthpiece in your mouth past your front teeth and rest it on the top of your tongue  Do not block the mouthpiece opening with your tongue  · Breathe in through your mouth at a slow and steady rate  As you do this, press the inhaler to release the puff of medicine  Finish breathing in slowly and deeply as you inhale the medicine  When your lungs are full, hold your breath for 10 seconds  Then breathe out slowly through puckered lips or through your nose  · If you need to take more puffs, wait at least 1 minute between each puff  · Rinse your mouth with water after you use the inhaler  This may keep you from getting a mouth infection or irritation  · Follow the instructions that come with your inhaler to clean it  You should clean your inhaler at least once a week  Ways to care for yourself:   · Avoid alcohol:  Alcohol dulls your urge to cough and sneeze  When you have bronchitis, you need to be able to cough and sneeze to clear your air passages  Alcohol also causes your body to lose fluid  This can make the mucus in your lungs thicker and harder to cough up  · Avoid irritants in the air:  Do not smoke or allow others to smoke around you  Avoid chemicals, fumes, and dust  Wear a face mask if you must work around dust or fumes  Stay inside on days when air pollution levels are high  If you have allergies, stay inside when pollen counts are high  Avoid aerosol products  This includes spray-on deodorant, bug spray, and hair spray  · Drink more liquids:  Most people should drink at least 8 eight-ounce cups of water a day  You may need to drink more liquids when you have acute bronchitis   Liquids help keep your air passages moist and help you cough up mucus  · Get more rest:  You may feel like resting more  Slowly start to do more each day  Rest when you feel it is needed  · Eat healthy foods:  Eat a variety healthy foods every day  Your diet should include fruits, vegetables, breads, and protein (such as chicken, fish, and beans)  Dairy products (such as milk, cheese, and ice cream) can sometimes increase the amount of mucus your body makes  Ask if you should decrease your intake of dairy products  · Use a humidifier:  Use a cool mist humidifier to increase air moisture in your home  This may make it easier for you to breathe and help decrease your cough  Decrease your risk of acute bronchitis:   · Get the vaccinations you need:  Ask your healthcare provider if you should get vaccinated against the flu or pneumonia  · Avoid things that may irritate your lungs:  Stay inside or cover your mouth and nose with a scarf when you are outside during cold weather  You should also stay inside on days when air pollution levels are high  If you have allergies, stay inside when pollen counts are high  Avoid using aerosol products in your home  This includes spray-on deodorant, bug spray, and hair spray  · Avoid the spread of germs:        [facility] your hands often with soap and water  Carry germ-killing gel with you  You can use the gel to clean your hands when there is no soap and water available  ¨ Do not touch your eyes, nose, or mouth unless you have washed your hands first     ¨ Always cover your mouth when you cough  Cough into a tissue or your shirtsleeve so you do not spread germs from your hands  ¨ Try to avoid people who have a cold or the flu  If you are sick, stay away from others as much as possible  Follow up with your healthcare provider as directed:  Write down questions you have so you will remember to ask them during your follow-up visits    Contact your healthcare provider if:   · You have a fever     · Your skin becomes itchy or you have a rash after you take your medicine  · Your breathing problems do not go away or get worse  · Your cough does not get better with treatment  · You cough up blood  · You have questions or concerns about your condition or care  Seek care immediately or call 911 if:   · You faint  · Your lips or fingernails turn blue  · You feel like you are not getting enough air when you breathe  · You have swelling of your lips, tongue, or throat that makes it hard to breathe or swallow  © 2014 [employer] is for End User's use only and may not be sold, redistributed or otherwise used for commercial purposes  All illustrations and images included in CareNotes® are the copyrighted property of [employer]  or [employer]  The above information is an  only  It is not intended as medical advice for individual conditions or treatments  Talk to your doctor, nurse or pharmacist before following any medical regimen to see if it is safe and effective for you

## 2018-07-22 NOTE — ED PROVIDER NOTES
History  Chief Complaint   Patient presents with    Cough     Pt presents with productive cough, sore throat, and  sinus congestion x 6 days  No meds PTA  64-year-old male patient presenting here with a chief complaint of cough for about a week  He reports a raspy voice  Reports occasional wheezing  Subjective fever  Does not appear toxic            Prior to Admission Medications   Prescriptions Last Dose Informant Patient Reported? Taking? Liraglutide 18 MG/3ML SOPN   Yes No   Sig: Inject 1 2 mL under the skin   aspirin 81 mg chewable tablet   No No   Sig: Chew 1 tablet daily   atorvastatin (LIPITOR) 20 mg tablet   Yes No   Sig: Take 20 mg by mouth daily   lisinopril (ZESTRIL) 20 mg tablet   Yes No   Sig: Take 20 mg by mouth daily   metFORMIN (GLUCOPHAGE) 1000 MG tablet   Yes No   Sig: Take 1,000 mg by mouth 2 (two) times a day with meals  nicotine (NICODERM CQ) 21 mg/24 hr TD 24 hr patch   No No   Sig: Place 1 patch on the skin daily   ondansetron (ZOFRAN) 4 mg tablet   No No   Sig: Take 1 tablet (4 mg total) by mouth every 6 (six) hours For nausea or vomiting   pantoprazole (PROTONIX) 40 mg tablet   No No   Sig: Take 1 tablet (40 mg total) by mouth daily   sucralfate (CARAFATE) 1 g tablet   No No   Sig: Take 1 tablet (1 g total) by mouth 4 (four) times a day      Facility-Administered Medications: None       Past Medical History:   Diagnosis Date    Asthma     Diabetes mellitus ([facility] [place] [address] )     Hyperlipidemia     Hypertension        Past Surgical History:   Procedure Laterality Date    APPENDECTOMY      teenager        Family History   Problem Relation Age of Onset    Hypertension Mother     Hypertension Father      I have reviewed and agree with the history as documented      Social History   Substance Use Topics    Smoking status: Current Every Day Smoker     Packs/day: 1 00     Types: Cigarettes    Smokeless tobacco: Never Used    Alcohol use No        Review of Systems   Constitutional: Positive for chills  Negative for diaphoresis, fatigue and fever  HENT: Positive for rhinorrhea  Negative for congestion, ear pain, nosebleeds, postnasal drip and sore throat  Eyes: Negative for photophobia, pain, discharge and visual disturbance  Respiratory: Positive for cough and wheezing  Negative for choking, chest tightness and shortness of breath  Cardiovascular: Negative for chest pain and palpitations  Gastrointestinal: Negative for abdominal distention, abdominal pain, diarrhea, nausea and vomiting  Genitourinary: Negative for dysuria, flank pain, frequency and urgency  Musculoskeletal: Positive for myalgias  Negative for back pain, gait problem, joint swelling and neck pain  Skin: Negative for color change, pallor and rash  Neurological: Negative for dizziness, syncope, weakness, light-headedness and headaches  Psychiatric/Behavioral: Negative for behavioral problems and confusion  The patient is not nervous/anxious  All other systems reviewed and are negative  Physical Exam  Physical Exam   Constitutional: He is oriented to person, place, and time  He appears well-developed and well-nourished  He appears distressed (mildly)  HENT:   Head: Normocephalic and atraumatic  Nose: Rhinorrhea (clear) present  No sinus tenderness  Mouth/Throat: Mucous membranes are normal  No trismus in the jaw  Posterior oropharyngeal erythema present  No oropharyngeal exudate  Eyes: Conjunctivae and EOM are normal  Pupils are equal, round, and reactive to light  Right eye exhibits no discharge  Left eye exhibits no discharge  Neck: Normal range of motion  Neck supple  No JVD present  Cardiovascular: Normal rate and regular rhythm  Pulmonary/Chest: Effort normal  He has wheezes  He exhibits tenderness (from coughing)  Abdominal: Soft  He exhibits no distension  There is no guarding  Musculoskeletal: Normal range of motion  He exhibits tenderness (generalized body aches)  Lymphadenopathy:     He has no cervical adenopathy  Neurological: He is alert and oriented to person, place, and time  Skin: Skin is warm  No rash noted  Psychiatric: He has a normal mood and affect  Vitals reviewed  Vital Signs  ED Triage Vitals [07/22/18 1143]   Temperature Pulse Respirations Blood Pressure SpO2   98 8 °F (37 1 °C) 80 18 131/77 95 %      Temp src Heart Rate Source Patient Position - Orthostatic VS BP Location FiO2 (%)   -- Monitor Sitting Left arm --      Pain Score       5           Vitals:    07/22/18 1143 07/22/18 1418   BP: 131/77 148/72   Pulse: 80 90   Patient Position - Orthostatic VS: Sitting Lying       Visual Acuity      ED Medications  Medications   albuterol inhalation solution 5 mg (5 mg Nebulization Given 7/22/18 1226)   ipratropium (ATROVENT) 0 02 % inhalation solution 0 5 mg (0 5 mg Nebulization Given 7/22/18 1226)   predniSONE tablet 60 mg (60 mg Oral Given 7/22/18 1225)   HYDROcodone-homatropine (HYCODAN) 5-1 5 mg/5 mL oral syrup 5 mL (5 mL Oral Given 7/22/18 1228)       Diagnostic Studies  Results Reviewed     None                 XR chest pa & lateral   Final Result by Tia Oliveira MD (07/22 1301)      No acute cardiopulmonary disease              Workstation performed: [id number]                    Procedures  Procedures       Phone Contacts  ED Phone Contact    ED Course                               MDM  Number of Diagnoses or Management Options  Acute bronchitis, unspecified organism: new and requires workup     Amount and/or Complexity of Data Reviewed  Tests in the radiology section of CPT®: reviewed and ordered    Patient Progress  Patient progress: improved    CritCare Time    Disposition  Final diagnoses:   Acute bronchitis, unspecified organism     Time reflects when diagnosis was documented in both MDM as applicable and the Disposition within this note     Time User Action Codes Description Comment    7/22/2018  2:10 PM Deloris Smith Add [J20 9] Acute bronchitis, unspecified organism       ED Disposition     ED Disposition Condition Comment    Discharge  Toshia Verdin discharge to home/self care  Condition at discharge: Good        Follow-up Information     Follow up With Specialties Details Why Contact Info    Constanza Manriquez MD Internal Medicine Schedule an appointment as soon as possible for a visit For Continued Evaluation [facility]  [phone]            Discharge Medication List as of 7/22/2018  2:12 PM      START taking these medications    Details   albuterol (PROVENTIL HFA,VENTOLIN HFA) 90 mcg/act inhaler Inhale 2 puffs every 6 (six) hours as needed for wheezing for up to 10 days, Starting Sun 7/22/2018, Until Wed 8/1/2018, Print      azithromycin (ZITHROMAX) 250 mg tablet Take 500 mg day 1, 250 mg days 2-5, Print      predniSONE 20 mg tablet Take 3 tablets (60 mg total) by mouth daily for 5 days, Starting Sun 7/22/2018, Until Fri 7/27/2018, Print      promethazine-dextromethorphan (PHENERGAN-DM) 6 25-15 mg/5 mL oral syrup Take 5 mL by mouth 4 (four) times a day as needed for cough for up to 5 days, Starting Sun 7/22/2018, Until Fri 7/27/2018, Print         CONTINUE these medications which have NOT CHANGED    Details   aspirin 81 mg chewable tablet Chew 1 tablet daily, Starting Tue 12/12/2017, Print      atorvastatin (LIPITOR) 20 mg tablet Take 20 mg by mouth daily, Historical Med      Liraglutide 18 MG/3ML SOPN Inject 1 2 mL under the skin, Historical Med      lisinopril (ZESTRIL) 20 mg tablet Take 20 mg by mouth daily, Historical Med      metFORMIN (GLUCOPHAGE) 1000 MG tablet Take 1,000 mg by mouth 2 (two) times a day with meals  , Until Discontinued, Historical Med      nicotine (NICODERM CQ) 21 mg/24 hr TD 24 hr patch Place 1 patch on the skin daily, Starting Tue 12/12/2017, Print      ondansetron (ZOFRAN) 4 mg tablet Take 1 tablet (4 mg total) by mouth every 6 (six) hours For nausea or vomiting, Starting Sat 3/31/2018, Print      pantoprazole (PROTONIX) 40 mg tablet Take 1 tablet (40 mg total) by mouth daily, Starting Sat 3/31/2018, Print      sucralfate (CARAFATE) 1 g tablet Take 1 tablet (1 g total) by mouth 4 (four) times a day, Starting Sat 3/31/2018, Print           No discharge procedures on file      ED Provider  Electronically Signed by           LONNIE Brand  07/22/18 8298

## 2018-09-16 ENCOUNTER — HOSPITAL ENCOUNTER (EMERGENCY)
Facility: HOSPITAL | Age: 53
Discharge: HOME | End: 2018-09-17
Attending: EMERGENCY MEDICINE
Payer: COMMERCIAL

## 2018-09-16 ENCOUNTER — APPOINTMENT (EMERGENCY)
Dept: RADIOLOGY | Facility: HOSPITAL | Age: 53
End: 2018-09-16
Payer: COMMERCIAL

## 2018-09-16 DIAGNOSIS — J45.901 ASTHMA EXACERBATION: ICD-10-CM

## 2018-09-16 DIAGNOSIS — R06.00 DYSPNEA: Primary | ICD-10-CM

## 2018-09-16 DIAGNOSIS — R73.9 HYPERGLYCEMIA: ICD-10-CM

## 2018-09-16 LAB
ALBUMIN SERPL BCP-MCNC: 3.5 G/DL (ref 3.5–5)
ALP SERPL-CCNC: 94 U/L (ref 46–116)
ALT SERPL W P-5'-P-CCNC: 33 U/L (ref 12–78)
ANION GAP SERPL CALCULATED.3IONS-SCNC: 9 MMOL/L (ref 4–13)
AST SERPL W P-5'-P-CCNC: 12 U/L (ref 5–45)
ATRIAL RATE: 85 BPM
BASOPHILS # BLD AUTO: 0.01 THOUSANDS/ΜL (ref 0–0.1)
BASOPHILS NFR BLD AUTO: 0 % (ref 0–1)
BILIRUB SERPL-MCNC: 0.5 MG/DL (ref 0.2–1)
BUN SERPL-MCNC: 11 MG/DL (ref 5–25)
CALCIUM SERPL-MCNC: 9.2 MG/DL (ref 8.3–10.1)
CHLORIDE SERPL-SCNC: 100 MMOL/L (ref 100–108)
CO2 SERPL-SCNC: 29 MMOL/L (ref 21–32)
CREAT SERPL-MCNC: 1.16 MG/DL (ref 0.6–1.3)
DEPRECATED D DIMER PPP: 349 NG/ML (FEU) (ref 0–424)
EOSINOPHIL # BLD AUTO: 0.16 THOUSAND/ΜL (ref 0–0.61)
EOSINOPHIL NFR BLD AUTO: 1 % (ref 0–6)
ERYTHROCYTE [DISTWIDTH] IN BLOOD BY AUTOMATED COUNT: 14.4 % (ref 11.6–15.1)
GFR SERPL CREATININE-BSD FRML MDRD: 83 ML/MIN/1.73SQ M
GLUCOSE SERPL-MCNC: 303 MG/DL (ref 65–140)
HCT VFR BLD AUTO: 44.9 % (ref 36.5–49.3)
HGB BLD-MCNC: 15.1 G/DL (ref 12–17)
LYMPHOCYTES # BLD AUTO: 1.99 THOUSANDS/ΜL (ref 0.6–4.47)
LYMPHOCYTES NFR BLD AUTO: 17 % (ref 14–44)
MCH RBC QN AUTO: 29 PG (ref 26.8–34.3)
MCHC RBC AUTO-ENTMCNC: 33.6 G/DL (ref 31.4–37.4)
MCV RBC AUTO: 86 FL (ref 82–98)
MONOCYTES # BLD AUTO: 0.84 THOUSAND/ΜL (ref 0.17–1.22)
MONOCYTES NFR BLD AUTO: 7 % (ref 4–12)
NEUTROPHILS # BLD AUTO: 8.97 THOUSANDS/ΜL (ref 1.85–7.62)
NEUTS SEG NFR BLD AUTO: 75 % (ref 43–75)
P AXIS: 63 DEGREES
PLATELET # BLD AUTO: 257 THOUSANDS/UL (ref 149–390)
PMV BLD AUTO: 9.9 FL (ref 8.9–12.7)
POTASSIUM SERPL-SCNC: 3.8 MMOL/L (ref 3.5–5.3)
PR INTERVAL: 168 MS
PROT SERPL-MCNC: 7.6 G/DL (ref 6.4–8.2)
QRS AXIS: 22 DEGREES
QRSD INTERVAL: 90 MS
QT INTERVAL: 342 MS
QTC INTERVAL: 406 MS
RBC # BLD AUTO: 5.2 MILLION/UL (ref 3.88–5.62)
SODIUM SERPL-SCNC: 138 MMOL/L (ref 136–145)
T WAVE AXIS: 74 DEGREES
TROPONIN I SERPL-MCNC: <0.02 NG/ML
VENTRICULAR RATE: 85 BPM
WBC # BLD AUTO: 11.97 THOUSAND/UL (ref 4.31–10.16)

## 2018-09-16 PROCEDURE — 36415 COLL VENOUS BLD VENIPUNCTURE: CPT | Performed by: EMERGENCY MEDICINE

## 2018-09-16 PROCEDURE — 80053 COMPREHEN METABOLIC PANEL: CPT | Performed by: EMERGENCY MEDICINE

## 2018-09-16 PROCEDURE — 93005 ELECTROCARDIOGRAM TRACING: CPT

## 2018-09-16 PROCEDURE — 93010 ELECTROCARDIOGRAM REPORT: CPT | Performed by: INTERNAL MEDICINE

## 2018-09-16 PROCEDURE — 94640 AIRWAY INHALATION TREATMENT: CPT

## 2018-09-16 PROCEDURE — 96366 THER/PROPH/DIAG IV INF ADDON: CPT

## 2018-09-16 PROCEDURE — 85025 COMPLETE CBC W/AUTO DIFF WBC: CPT | Performed by: EMERGENCY MEDICINE

## 2018-09-16 PROCEDURE — 96365 THER/PROPH/DIAG IV INF INIT: CPT

## 2018-09-16 PROCEDURE — 85379 FIBRIN DEGRADATION QUANT: CPT | Performed by: EMERGENCY MEDICINE

## 2018-09-16 PROCEDURE — 71046 X-RAY EXAM CHEST 2 VIEWS: CPT

## 2018-09-16 PROCEDURE — 84484 ASSAY OF TROPONIN QUANT: CPT | Performed by: EMERGENCY MEDICINE

## 2018-09-16 RX ORDER — 0.9 % SODIUM CHLORIDE 0.9 %
3 VIAL (ML) INJECTION AS NEEDED
Status: DISCONTINUED | OUTPATIENT
Start: 2018-09-16 | End: 2018-09-17 | Stop reason: HOSPADM

## 2018-09-16 RX ORDER — ALBUTEROL SULFATE 2.5 MG/3ML
5 SOLUTION RESPIRATORY (INHALATION) ONCE
Status: COMPLETED | OUTPATIENT
Start: 2018-09-16 | End: 2018-09-16

## 2018-09-16 RX ORDER — MAGNESIUM SULFATE HEPTAHYDRATE 40 MG/ML
2 INJECTION, SOLUTION INTRAVENOUS ONCE
Status: COMPLETED | OUTPATIENT
Start: 2018-09-16 | End: 2018-09-17

## 2018-09-16 RX ADMIN — IPRATROPIUM BROMIDE 0.5 MG: 0.5 SOLUTION RESPIRATORY (INHALATION) at 22:20

## 2018-09-16 RX ADMIN — ALBUTEROL SULFATE 5 MG: 2.5 SOLUTION RESPIRATORY (INHALATION) at 22:24

## 2018-09-16 RX ADMIN — ALBUTEROL SULFATE 5 MG: 2.5 SOLUTION RESPIRATORY (INHALATION) at 22:20

## 2018-09-16 RX ADMIN — MAGNESIUM SULFATE HEPTAHYDRATE 2 G: 40 INJECTION, SOLUTION INTRAVENOUS at 22:29

## 2018-09-16 RX ADMIN — IPRATROPIUM BROMIDE 0.5 MG: 0.5 SOLUTION RESPIRATORY (INHALATION) at 22:24

## 2018-09-17 VITALS
OXYGEN SATURATION: 93 % | HEART RATE: 96 BPM | SYSTOLIC BLOOD PRESSURE: 148 MMHG | RESPIRATION RATE: 20 BRPM | TEMPERATURE: 97.7 F | DIASTOLIC BLOOD PRESSURE: 75 MMHG

## 2018-09-17 PROCEDURE — 99285 EMERGENCY DEPT VISIT HI MDM: CPT

## 2018-09-17 RX ORDER — AZITHROMYCIN 250 MG/1
500 TABLET, FILM COATED ORAL ONCE
Status: COMPLETED | OUTPATIENT
Start: 2018-09-17 | End: 2018-09-17

## 2018-09-17 RX ORDER — PREDNISONE 20 MG/1
60 TABLET ORAL DAILY
Qty: 12 TABLET | Refills: 0 | Status: SHIPPED | OUTPATIENT
Start: 2018-09-17 | End: 2018-09-21

## 2018-09-17 RX ORDER — AZITHROMYCIN 250 MG/1
250 TABLET, FILM COATED ORAL DAILY
Qty: 4 TABLET | Refills: 0 | Status: SHIPPED | OUTPATIENT
Start: 2018-09-17 | End: 2018-09-21

## 2018-09-17 RX ORDER — ALBUTEROL SULFATE 90 UG/1
2 AEROSOL, METERED RESPIRATORY (INHALATION) EVERY 4 HOURS PRN
Qty: 1 INHALER | Refills: 0 | Status: SHIPPED | OUTPATIENT
Start: 2018-09-17

## 2018-09-17 RX ORDER — PREDNISONE 20 MG/1
60 TABLET ORAL ONCE
Status: COMPLETED | OUTPATIENT
Start: 2018-09-17 | End: 2018-09-17

## 2018-09-17 RX ORDER — ALBUTEROL SULFATE 90 UG/1
2 AEROSOL, METERED RESPIRATORY (INHALATION) ONCE
Status: COMPLETED | OUTPATIENT
Start: 2018-09-17 | End: 2018-09-17

## 2018-09-17 RX ADMIN — AZITHROMYCIN MONOHYDRATE 500 MG: 250 TABLET ORAL at 01:00

## 2018-09-17 RX ADMIN — ALBUTEROL SULFATE 2 PUFF: 90 AEROSOL, METERED RESPIRATORY (INHALATION) at 01:31

## 2018-09-17 RX ADMIN — PREDNISONE 60 MG: 20 TABLET ORAL at 00:59

## 2018-09-17 NOTE — DISCHARGE INSTRUCTIONS
Asthma   WHAT YOU NEED TO KNOW:   Asthma is a lung disease that makes breathing difficult  Chronic inflammation and reactions to triggers narrow the airways in the lungs  Asthma can become life-threatening if it is not managed  DISCHARGE INSTRUCTIONS:   Return to the emergency department if:   · You have severe shortness of breath  · Your lips or nails turn blue or gray  · The skin around your neck and ribs pulls in with each breath  · You have shortness of breath, even after you take your short-term medicine as directed  · Your peak flow numbers are in the red zone of your AAP  Contact your healthcare provider if:   · You run out of medicine before your next refill is due  · Your symptoms get worse  · You need to take more medicine than usual to control your symptoms  · You have questions or concerns about your condition or care  Medicines:   · Medicines  decrease inflammation, open airways, and make it easier to breathe  Medicines may be inhaled, taken as a pill, or injected  Short-term medicines relieve your symptoms quickly  Long-term medicines are used to prevent future attacks  You may also need medicine to help control your allergies  Ask your healthcare provider for more information about the medicine you are given and how to take it safely  · Take your medicine as directed  Contact your healthcare provider if you think your medicine is not helping or if you have side effects  Tell him of her if you are allergic to any medicine  Keep a list of the medicines, vitamins, and herbs you take  Include the amounts, and when and why you take them  Bring the list or the pill bottles to follow-up visits  Carry your medicine list with you in case of an emergency  Follow up with your healthcare provider as directed: You will need to return to make sure your medicine is working and your symptoms are controlled   You may be referred to an asthma specialist  Amber Foy may be asked to keep a record of your peak flow values and bring it with you to your appointments  Write down your questions so you remember to ask them during your visits  Manage your symptoms and prevent future attacks:   · Follow your Asthma Action Plan (AAP)  This is a written plan that you and your healthcare provider create  It explains which medicine you need and when to change doses if necessary  It also explains how you can monitor symptoms and use a peak flow meter  The meter measures how well your lungs are working  · Manage other health conditions , such as allergies, acid reflux, and sleep apnea  · Identify and avoid triggers  These may include pets, dust mites, mold, and cockroaches  · Do not smoke or be around others who smoke  Nicotine and other chemicals in cigarettes and cigars can cause lung damage  Ask your healthcare provider for information if you currently smoke and need help to quit  E-cigarettes or smokeless tobacco still contain nicotine  Talk to your healthcare provider before you use these products  · Ask about the flu vaccine  The flu can make your asthma worse  You may need a yearly flu shot  © 2017 [employer] [facility] Information is for End User's use only and may not be sold, redistributed or otherwise used for commercial purposes  All illustrations and images included in CareNotes® are the copyrighted property of A D A M [employer]  or [employer]  The above information is an  only  It is not intended as medical advice for individual conditions or treatments  Talk to your doctor, nurse or pharmacist before following any medical regimen to see if it is safe and effective for you  Dyspnea   WHAT YOU NEED TO KNOW:   Dyspnea is breathing difficulty or discomfort  You may have labored, painful, or shallow breathing  You may feel breathless or short of breath  Dyspnea can occur during rest or with activity   You may have dyspnea for a short time, or it might become chronic  Dyspnea is often a symptom of a disease or condition  DISCHARGE INSTRUCTIONS:   Return to the emergency department if:   · Your signs and symptoms are the same or worse within 24 hours of treatment  · You have shaking chills or a fever over 102°F      · You have new pain, pressure, or tightness in your chest      · You have a new or worse cough or wheezing, or you cough up blood  · You feel like you cannot get enough air  · The skin over your ribs or on your neck sinks in when you breathe  · You have a severe headache with vomiting and abdominal pain  · You feel confused or dizzy  Contact your healthcare provider or specialist if:   · You have questions or concerns about your condition or care  Medicines:   · Medicines  may be used to treat the cause of your dyspnea  Medicines may reduce swelling in your airway or decrease extra fluid from around your heart or lungs  Other medicines may be used to decrease anxiety and help you feel calm and relaxed  · Take your medicine as directed  Contact your healthcare provider if you think your medicine is not helping or if you have side effects  Tell him or her if you are allergic to any medicine  Keep a list of the medicines, vitamins, and herbs you take  Include the amounts, and when and why you take them  Bring the list or the pill bottles to follow-up visits  Carry your medicine list with you in case of an emergency  Manage long-term dyspnea:   · Create an action plan  You and your healthcare provider can work together to create a plan for how to handle episodes of dyspnea  The plan can include daily activities, treatment changes, and what to do if you have severe breathing problems  · Lean forward on your elbows when you sit  This helps your lungs expand and may make it easier to breathe  · Use pursed-lip breathing any time you feel short of breath    Breathe in through your nose and then slowly breathe out through your mouth with your lips slightly puckered  It should take you twice as long to breathe out as it did to breathe in  · Do not smoke  Nicotine and other chemicals in cigarettes and cigars can cause lung damage and make it harder to breathe  Ask your healthcare provider for information if you currently smoke and need help to quit  E-cigarettes or smokeless tobacco still contain nicotine  Talk to your healthcare provider before you use these products  · Reach or maintain a healthy weight  Your healthcare provider can help you create a safe weight loss plan if you are overweight  · Exercise as directed  Exercise can help your lungs work more easily  Exercise can also help you lose weight if needed  Try to get at least 30 minutes of exercise most days of the week  Your healthcare provider can help you create an exercise plan that is safe for you  Follow up with your healthcare provider or specialist as directed:  Write down your questions so you remember to ask them during your visits  © 2017 [employer]  Information is for End User's use only and may not be sold, redistributed or otherwise used for commercial purposes  All illustrations and images included in CareNotes® are the copyrighted property of [employer]  or [employer]  The above information is an  only  It is not intended as medical advice for individual conditions or treatments  Talk to your doctor, nurse or pharmacist before following any medical regimen to see if it is safe and effective for you

## 2018-09-17 NOTE — ED PROVIDER NOTES
History  Chief Complaint   Patient presents with    Shortness of Breath     hx of asthma   feeling SOB and broke right rib a few months ago   recently started hurting again     47year-old male with a history of asthma presents with a week of increasing dyspnea over the past several hours  Patient notes increased cough that he describes as a nonproductive cough  Patient notes intermittent chest pain but states that has subsequently resolved  Patient has chronic right rib pain in the area of his prior broken ribs though denies any at present, he notes this worsens with cough  The patient denies any history of prior hospitalizations or intubations for his asthma  He has a history of recurrent bronchitis  He affirms continued smoking but states he has been attempting to cut down  I discussed smoking cessation with the patient need for follow-up with pulmonology for pulmonary function testing as he does not take daily medications regarding his reporting asthma  Impression and plan:  Dyspnea with a broad differential   Based on patient's history and physical, likely asthma exacerbation  Considering patient's intermittent chest pain, will obtain cardiac evaluation for alternative causes though this is less likely considering history and information obtained thus far  Will obtain chest x-ray to evaluate for alternative causes and treat patient symptomatically with nebulized medications and corticosteroids          History provided by:  Patient  Shortness of Breath   Severity:  Severe  Onset quality:  Gradual  Duration:  7 hours  Timing:  Constant  Progression:  Worsening  Chronicity:  New  Context: not known allergens    Relieved by:  None tried  Worsened by:  Coughing  Ineffective treatments:  None tried  Associated symptoms: chest pain, cough and wheezing    Associated symptoms: no abdominal pain, no claudication, no diaphoresis, no ear pain, no fever, no headaches, no hemoptysis, no neck pain, no PND, no rash, no sore throat, no sputum production, no syncope, no swollen glands and no vomiting        Prior to Admission Medications   Prescriptions Last Dose Informant Patient Reported? Taking? Liraglutide 18 MG/3ML SOPN   Yes No   Sig: Inject 1 2 mL under the skin   aspirin 81 mg chewable tablet   No No   Sig: Chew 1 tablet daily   atorvastatin (LIPITOR) 20 mg tablet   Yes No   Sig: Take 20 mg by mouth daily   lisinopril (ZESTRIL) 20 mg tablet   Yes No   Sig: Take 20 mg by mouth daily   metFORMIN (GLUCOPHAGE) 1000 MG tablet   Yes No   Sig: Take 1,000 mg by mouth 2 (two) times a day with meals  nicotine (NICODERM CQ) 21 mg/24 hr TD 24 hr patch   No No   Sig: Place 1 patch on the skin daily   ondansetron (ZOFRAN) 4 mg tablet   No No   Sig: Take 1 tablet (4 mg total) by mouth every 6 (six) hours For nausea or vomiting   pantoprazole (PROTONIX) 40 mg tablet   No No   Sig: Take 1 tablet (40 mg total) by mouth daily   sucralfate (CARAFATE) 1 g tablet   No No   Sig: Take 1 tablet (1 g total) by mouth 4 (four) times a day      Facility-Administered Medications: None       Past Medical History:   Diagnosis Date    Asthma     Diabetes mellitus ([facility] [address] )     Hyperlipidemia     Hypertension        Past Surgical History:   Procedure Laterality Date    APPENDECTOMY      teenager        Family History   Problem Relation Age of Onset    Hypertension Mother     Hypertension Father      I have reviewed and agree with the history as documented  Social History   Substance Use Topics    Smoking status: Current Every Day Smoker     Packs/day: 1 00     Types: Cigarettes    Smokeless tobacco: Never Used    Alcohol use No        Review of Systems   Constitutional: Negative for diaphoresis and fever  HENT: Negative for ear pain and sore throat  Respiratory: Positive for cough, shortness of breath and wheezing  Negative for hemoptysis and sputum production  Cardiovascular: Positive for chest pain   Negative for claudication, syncope and PND  Gastrointestinal: Negative for abdominal pain and vomiting  Musculoskeletal: Negative for neck pain  Skin: Negative for rash  Neurological: Negative for headaches  All other systems reviewed and are negative  Physical Exam  Physical Exam   Constitutional: He appears well-developed and well-nourished  No distress  HENT:   Head: Normocephalic and atraumatic  Eyes: Pupils are equal, round, and reactive to light  Neck: Normal range of motion  Neck supple  Cardiovascular: Normal rate and regular rhythm  Pulmonary/Chest: Effort normal  No respiratory distress  He has wheezes  He has no rales  He exhibits no tenderness  Abdominal: Soft  He exhibits no distension  There is no tenderness  Musculoskeletal: He exhibits no edema, tenderness or deformity  No clinical signs of DVT  Neurological: He is alert  Skin: Skin is warm and dry  He is not diaphoretic  Psychiatric: He has a normal mood and affect  Vitals reviewed        Vital Signs  ED Triage Vitals [09/16/18 2207]   Temperature Pulse Respirations Blood Pressure SpO2   97 7 °F (36 5 °C) 90 21 162/87 92 %      Temp Source Heart Rate Source Patient Position - Orthostatic VS BP Location FiO2 (%)   Oral Monitor Sitting Right arm --      Pain Score       Worst Possible Pain           Vitals:    09/16/18 2207 09/17/18 0101   BP: 162/87 148/75   Pulse: 90 96   Patient Position - Orthostatic VS: Sitting Sitting       Visual Acuity      ED Medications  Medications   albuterol inhalation solution 5 mg (5 mg Nebulization Given 9/16/18 2220)   ipratropium (ATROVENT) 0 02 % inhalation solution 0 5 mg (0 5 mg Nebulization Given 9/16/18 2220)   albuterol inhalation solution 5 mg (5 mg Nebulization Given 9/16/18 2224)   ipratropium (ATROVENT) 0 02 % inhalation solution 0 5 mg (0 5 mg Nebulization Given 9/16/18 2224)   magnesium sulfate 2 g/50 mL IVPB (premix) 2 g (0 g Intravenous Stopped 9/17/18 0050)   predniSONE tablet 60 mg (60 mg Oral Given 9/17/18 0059)   azithromycin (ZITHROMAX) tablet 500 mg (500 mg Oral Given 9/17/18 0100)   albuterol (PROVENTIL HFA,VENTOLIN HFA) inhaler 2 puff (2 puffs Inhalation Given 9/17/18 0131)       Diagnostic Studies  Results Reviewed     Procedure Component Value Units Date/Time    Troponin I [[id number]]  (Normal) Collected:  09/16/18 2231    Lab Status:  Final result Specimen:  Blood from Arm, Left Updated:  09/16/18 2306     Troponin I <0 02 ng/mL     Comprehensive metabolic panel [[id number]]  (Abnormal) Collected:  09/16/18 2231    Lab Status:  Final result Specimen:  Blood from Arm, Left Updated:  09/16/18 2302     Sodium 138 mmol/L      Potassium 3 8 mmol/L      Chloride 100 mmol/L      CO2 29 mmol/L      ANION GAP 9 mmol/L      BUN 11 mg/dL      Creatinine 1 16 mg/dL      Glucose 303 (H) mg/dL      Calcium 9 2 mg/dL      AST 12 U/L      ALT 33 U/L      Alkaline Phosphatase 94 U/L      Total Protein 7 6 g/dL      Albumin 3 5 g/dL      Total Bilirubin 0 50 mg/dL      eGFR 83 ml/min/1 73sq m     Narrative:         National Kidney Disease Education Program recommendations are as follows:  GFR calculation is accurate only with a steady state creatinine  Chronic Kidney disease less than 60 ml/min/1 73 sq  meters  Kidney failure less than 15 ml/min/1 73 sq  meters      D-dimer, quantitative [[id number]]  (Normal) Collected:  09/16/18 2231    Lab Status:  Final result Specimen:  Blood from Arm, Left Updated:  09/16/18 2258     D-Dimer, Quant 349 ng/ml (FEU)     CBC and differential [[id number]]  (Abnormal) Collected:  09/16/18 2231    Lab Status:  Final result Specimen:  Blood from Arm, Left Updated:  09/16/18 2237     WBC 11 97 (H) Thousand/uL      RBC 5 20 Million/uL      Hemoglobin 15 1 g/dL      Hematocrit 44 9 %      MCV 86 fL      MCH 29 0 pg      MCHC 33 6 g/dL      RDW 14 4 %      MPV 9 9 fL      Platelets 477 Thousands/uL      Neutrophils Relative 75 %      Lymphocytes Relative 17 %      Monocytes Relative 7 % Eosinophils Relative 1 %      Basophils Relative 0 %      Neutrophils Absolute 8 97 (H) Thousands/µL      Lymphocytes Absolute 1 99 Thousands/µL      Monocytes Absolute 0 84 Thousand/µL      Eosinophils Absolute 0 16 Thousand/µL      Basophils Absolute 0 01 Thousands/µL                  XR chest 2 views   ED Interpretation by Gabbi Norton MD (09/17 0002)   No acute findings, appears nearly identical to prior CXR      Final Result by Crow Canales MD (09/17 3416)      No acute cardiopulmonary findings  Workstation performed: [id number]                    Procedures  Procedures       Phone Contacts  ED Phone Contact    ED Course  ED Course as of Sep 20 0523   Armand Sabot Sep 16, 2018   2235 EKG demonstrates normal sinus rhythm with no acute ST segment changes  There is J-point elevation in lead V3 consistent with prior EKG in September  Mon Sep 17, 2018   0037 Patient's symptoms improved with nebulized medications  Discussed potential etiologies the patient  After extensive discussion, considering history of asthma and persistent wheezing but improvement of symptoms, will treat with corticosteroids  I discussed close monitoring of blood glucose levels and discussion with primary care tomorrow regarding adjustment of medications  Discussed risks and benefits of medication in detail  Discussed need for pulmonary function testing and likely need for daily medications  Patient has not seen pulmonology previously  Discussed he likely requires daily medication  Discussed smoking cessation  Discussed follow-up and return precautions in detail                                  MDM  CritCare Time    Disposition  Final diagnoses:   Dyspnea   Asthma exacerbation   Hyperglycemia     Time reflects when diagnosis was documented in both MDM as applicable and the Disposition within this note     Time User Action Codes Description Comment    9/17/2018 12:37 AM Fercho Mercado Add [R06 00] Dyspnea     9/17/2018 12:38 AM Sergio Agus Audie [Y78 314] Asthma exacerbation     9/17/2018 12:39 AM Sergio Maes Audie [R73 9] Hyperglycemia       ED Disposition     ED Disposition Condition Comment    Discharge  Everardo Lynn discharge to home/self care  Condition at discharge: Good        Follow-up Information     Follow up With Specialties Details Why [address] [place] MD Internal Medicine Schedule an appointment as soon as possible for a visit in 3 days Follow-up and reassessment  [facility]  [address] [place] [address] 530 Pulmonology Call today For follow-up and pulmonary function testing  Evaluation of asthma  Guadalupe Ace  [phone]          Discharge Medication List as of 9/17/2018 12:54 AM      START taking these medications    Details   albuterol (PROVENTIL HFA,VENTOLIN HFA) 90 mcg/act inhaler Inhale 2 puffs every 4 (four) hours as needed for wheezing, Starting Mon 9/17/2018, Print      predniSONE 20 mg tablet Take 3 tablets (60 mg total) by mouth daily for 4 days, Starting Mon 9/17/2018, Until Fri 9/21/2018, Print         CONTINUE these medications which have NOT CHANGED    Details   aspirin 81 mg chewable tablet Chew 1 tablet daily, Starting Tue 12/12/2017, Print      atorvastatin (LIPITOR) 20 mg tablet Take 20 mg by mouth daily, Historical Med      Liraglutide 18 MG/3ML SOPN Inject 1 2 mL under the skin, Historical Med      lisinopril (ZESTRIL) 20 mg tablet Take 20 mg by mouth daily, Historical Med      metFORMIN (GLUCOPHAGE) 1000 MG tablet Take 1,000 mg by mouth 2 (two) times a day with meals  , Until Discontinued, Historical Med      nicotine (NICODERM CQ) 21 mg/24 hr TD 24 hr patch Place 1 patch on the skin daily, Starting Tue 12/12/2017, Print      ondansetron (ZOFRAN) 4 mg tablet Take 1 tablet (4 mg total) by mouth every 6 (six) hours For nausea or vomiting, Starting Sat 3/31/2018, Print pantoprazole (PROTONIX) 40 mg tablet Take 1 tablet (40 mg total) by mouth daily, Starting Sat 3/31/2018, Print      sucralfate (CARAFATE) 1 g tablet Take 1 tablet (1 g total) by mouth 4 (four) times a day, Starting Sat 3/31/2018, Print           No discharge procedures on file      ED Provider  Electronically Signed by           Tank De La Cruz MD  09/20/18 2245

## 2019-03-04 ENCOUNTER — HOSPITAL ENCOUNTER (EMERGENCY)
Facility: HOSPITAL | Age: 54
Discharge: LEFT BEFORE DISCHARGE | End: 2019-03-04
Payer: COMMERCIAL

## 2019-03-04 VITALS
BODY MASS INDEX: 27.83 KG/M2 | WEIGHT: 199.52 LBS | HEART RATE: 80 BPM | RESPIRATION RATE: 20 BRPM | TEMPERATURE: 98.5 F | SYSTOLIC BLOOD PRESSURE: 158 MMHG | OXYGEN SATURATION: 95 % | DIASTOLIC BLOOD PRESSURE: 97 MMHG

## 2019-03-04 PROCEDURE — 93005 ELECTROCARDIOGRAM TRACING: CPT

## 2019-03-05 LAB
ATRIAL RATE: 72 BPM
ATRIAL RATE: 73 BPM
P AXIS: 50 DEGREES
P AXIS: 50 DEGREES
PR INTERVAL: 172 MS
PR INTERVAL: 178 MS
QRS AXIS: 101 DEGREES
QRS AXIS: 101 DEGREES
QRSD INTERVAL: 100 MS
QRSD INTERVAL: 96 MS
QT INTERVAL: 368 MS
QT INTERVAL: 370 MS
QTC INTERVAL: 402 MS
QTC INTERVAL: 407 MS
T WAVE AXIS: 13 DEGREES
T WAVE AXIS: 15 DEGREES
VENTRICULAR RATE: 72 BPM
VENTRICULAR RATE: 73 BPM

## 2019-03-05 PROCEDURE — 93010 ELECTROCARDIOGRAM REPORT: CPT | Performed by: INTERNAL MEDICINE

## 2019-03-05 NOTE — ED NOTES
Went out to waiting room to call pt back, pts family member/friend stated "He's leaving!" When I asked for clarification, family member/friend stated,"He's leaving! We're going to another one!" Pt refused to speak to me       Tashia Hopper, ASHLIE  03/04/19 8466

## 2019-03-05 NOTE — ED NOTES
Called by registration  When entered the waiting room, patient and family members were screaming obscenities at registration  Per registration, patient ripped off his ID band and threw it at registration  Security was called and escorted patient and family out of ER        Bridget Juarez RN  03/04/19 0597

## 2019-04-08 RX ORDER — LANCING DEVICE
1 EACH MISCELLANEOUS
COMMUNITY
Start: 2018-03-05

## 2019-04-08 RX ORDER — IBUPROFEN 600 MG/1
600 TABLET ORAL EVERY 8 HOURS PRN
COMMUNITY
Start: 2016-08-31 | End: 2019-04-22

## 2019-04-08 RX ORDER — FAMOTIDINE 20 MG/1
TABLET, FILM COATED ORAL
COMMUNITY
Start: 2019-03-25 | End: 2019-04-22

## 2019-04-08 RX ORDER — NAPROXEN 500 MG/1
1 TABLET ORAL EVERY 12 HOURS
COMMUNITY
Start: 2016-10-05

## 2019-04-08 RX ORDER — BUDESONIDE 0.25 MG/2ML
0.25 INHALANT ORAL
COMMUNITY
Start: 2019-03-04 | End: 2021-04-24

## 2019-04-08 RX ORDER — SYRING-NEEDL,DISP,INSUL,0.3 ML 31 G X1/4"
1 SYRINGE, EMPTY DISPOSABLE MISCELLANEOUS
COMMUNITY
Start: 2018-10-08

## 2019-04-08 RX ORDER — FLUTICASONE FUROATE AND VILANTEROL 200; 25 UG/1; UG/1
1 POWDER RESPIRATORY (INHALATION)
COMMUNITY
Start: 2018-12-11

## 2019-04-08 RX ORDER — DEXTROMETHORPHAN HYDROBROMIDE AND PROMETHAZINE HYDROCHLORIDE 15; 6.25 MG/5ML; MG/5ML
5 SYRUP ORAL 4 TIMES DAILY PRN
COMMUNITY
Start: 2019-01-30 | End: 2019-04-22

## 2019-04-08 RX ORDER — CYCLOBENZAPRINE HCL 5 MG
1 TABLET ORAL 3 TIMES DAILY PRN
COMMUNITY
Start: 2016-10-05 | End: 2019-04-22

## 2019-04-08 RX ORDER — PREDNISONE 20 MG/1
TABLET ORAL
COMMUNITY
Start: 2019-01-23 | End: 2019-05-16 | Stop reason: ALTCHOICE

## 2019-04-08 RX ORDER — LORATADINE 10 MG/1
1 TABLET ORAL DAILY
COMMUNITY
Start: 2015-08-06 | End: 2019-04-22

## 2019-04-08 RX ORDER — LISINOPRIL AND HYDROCHLOROTHIAZIDE 25; 20 MG/1; MG/1
1 TABLET ORAL
COMMUNITY
Start: 2018-03-05

## 2019-04-08 RX ORDER — DICYCLOMINE HCL 20 MG
TABLET ORAL
COMMUNITY
Start: 2019-03-26 | End: 2019-04-22

## 2019-04-08 RX ORDER — PEN NEEDLE, DIABETIC 29 G X1/2"
1 NEEDLE, DISPOSABLE MISCELLANEOUS
COMMUNITY
Start: 2017-11-30

## 2019-04-09 ENCOUNTER — OFFICE VISIT (OUTPATIENT)
Dept: GASTROENTEROLOGY | Facility: CLINIC | Age: 54
End: 2019-04-09
Payer: COMMERCIAL

## 2019-04-09 VITALS
SYSTOLIC BLOOD PRESSURE: 142 MMHG | BODY MASS INDEX: 27.22 KG/M2 | HEART RATE: 85 BPM | HEIGHT: 71 IN | WEIGHT: 194.4 LBS | DIASTOLIC BLOOD PRESSURE: 90 MMHG

## 2019-04-09 DIAGNOSIS — K59.00 CONSTIPATION, UNSPECIFIED CONSTIPATION TYPE: ICD-10-CM

## 2019-04-09 DIAGNOSIS — R11.0 NAUSEA: ICD-10-CM

## 2019-04-09 DIAGNOSIS — R10.84 GENERALIZED ABDOMINAL PAIN: Primary | ICD-10-CM

## 2019-04-09 DIAGNOSIS — Z80.0 FAMILY HISTORY OF COLON CANCER IN FATHER: ICD-10-CM

## 2019-04-09 PROBLEM — R10.13 EPIGASTRIC PAIN: Status: ACTIVE | Noted: 2019-04-09

## 2019-04-09 PROCEDURE — 99203 OFFICE O/P NEW LOW 30 MIN: CPT | Performed by: PHYSICIAN ASSISTANT

## 2019-04-09 RX ORDER — ATORVASTATIN CALCIUM 40 MG/1
TABLET, FILM COATED ORAL
Refills: 3 | COMMUNITY
Start: 2019-02-17

## 2019-04-10 DIAGNOSIS — R10.9 ABDOMINAL PAIN: ICD-10-CM

## 2019-04-11 RX ORDER — PANTOPRAZOLE SODIUM 40 MG/1
40 TABLET, DELAYED RELEASE ORAL 2 TIMES DAILY
Qty: 30 TABLET | Refills: 2 | Status: SHIPPED | OUTPATIENT
Start: 2019-04-11 | End: 2019-04-22

## 2019-04-22 ENCOUNTER — HOSPITAL ENCOUNTER (EMERGENCY)
Facility: HOSPITAL | Age: 54
Discharge: HOME | End: 2019-04-22
Attending: EMERGENCY MEDICINE
Payer: COMMERCIAL

## 2019-04-22 ENCOUNTER — APPOINTMENT (EMERGENCY)
Dept: RADIOLOGY | Facility: HOSPITAL | Age: 54
End: 2019-04-22
Payer: COMMERCIAL

## 2019-04-22 VITALS
HEART RATE: 84 BPM | RESPIRATION RATE: 16 BRPM | OXYGEN SATURATION: 97 % | SYSTOLIC BLOOD PRESSURE: 131 MMHG | WEIGHT: 196.65 LBS | HEIGHT: 71 IN | BODY MASS INDEX: 27.53 KG/M2 | TEMPERATURE: 98.4 F | DIASTOLIC BLOOD PRESSURE: 89 MMHG

## 2019-04-22 DIAGNOSIS — J18.9 PNEUMONIA: Primary | ICD-10-CM

## 2019-04-22 DIAGNOSIS — R73.9 HYPERGLYCEMIA: ICD-10-CM

## 2019-04-22 LAB
ACETONE SERPL-MCNC: NEGATIVE MG/DL
ALBUMIN SERPL BCP-MCNC: 3.4 G/DL (ref 3.5–5)
ALP SERPL-CCNC: 107 U/L (ref 46–116)
ALT SERPL W P-5'-P-CCNC: 29 U/L (ref 12–78)
ANION GAP SERPL CALCULATED.3IONS-SCNC: 12 MMOL/L (ref 4–13)
ARTERIAL PATENCY WRIST A: YES
AST SERPL W P-5'-P-CCNC: 10 U/L (ref 5–45)
ATRIAL RATE: 73 BPM
BASE EXCESS BLDA CALC-SCNC: -1 MMOL/L
BASOPHILS # BLD AUTO: 0.03 THOUSANDS/ΜL (ref 0–0.1)
BASOPHILS NFR BLD AUTO: 0 % (ref 0–1)
BILIRUB SERPL-MCNC: 0.4 MG/DL (ref 0.2–1)
BUN SERPL-MCNC: 9 MG/DL (ref 5–25)
CALCIUM SERPL-MCNC: 9.4 MG/DL (ref 8.3–10.1)
CHLORIDE SERPL-SCNC: 98 MMOL/L (ref 100–108)
CO2 SERPL-SCNC: 25 MMOL/L (ref 21–32)
CREAT SERPL-MCNC: 1.2 MG/DL (ref 0.6–1.3)
EOSINOPHIL # BLD AUTO: 0.18 THOUSAND/ΜL (ref 0–0.61)
EOSINOPHIL NFR BLD AUTO: 3 % (ref 0–6)
ERYTHROCYTE [DISTWIDTH] IN BLOOD BY AUTOMATED COUNT: 13.3 % (ref 11.6–15.1)
GFR SERPL CREATININE-BSD FRML MDRD: 80 ML/MIN/1.73SQ M
GLUCOSE SERPL-MCNC: 235 MG/DL (ref 65–140)
GLUCOSE SERPL-MCNC: 316 MG/DL (ref 65–140)
GLUCOSE SERPL-MCNC: 358 MG/DL (ref 65–140)
GLUCOSE SERPL-MCNC: 434 MG/DL (ref 65–140)
GLUCOSE SERPL-MCNC: 458 MG/DL (ref 65–140)
HCO3 BLDA-SCNC: 22.6 MMOL/L (ref 22–28)
HCT VFR BLD AUTO: 47.1 % (ref 36.5–49.3)
HGB BLD-MCNC: 15.5 G/DL (ref 12–17)
IMM GRANULOCYTES # BLD AUTO: 0.02 THOUSAND/UL (ref 0–0.2)
IMM GRANULOCYTES NFR BLD AUTO: 0 % (ref 0–2)
LYMPHOCYTES # BLD AUTO: 2.68 THOUSANDS/ΜL (ref 0.6–4.47)
LYMPHOCYTES NFR BLD AUTO: 38 % (ref 14–44)
MCH RBC QN AUTO: 29 PG (ref 26.8–34.3)
MCHC RBC AUTO-ENTMCNC: 32.9 G/DL (ref 31.4–37.4)
MCV RBC AUTO: 88 FL (ref 82–98)
MONOCYTES # BLD AUTO: 0.48 THOUSAND/ΜL (ref 0.17–1.22)
MONOCYTES NFR BLD AUTO: 7 % (ref 4–12)
NEUTROPHILS # BLD AUTO: 3.69 THOUSANDS/ΜL (ref 1.85–7.62)
NEUTS SEG NFR BLD AUTO: 52 % (ref 43–75)
NON VENT ROOM AIR: 21 %
NRBC BLD AUTO-RTO: 0 /100 WBCS
O2 CT BLDA-SCNC: 21.9 ML/DL (ref 16–23)
OXYHGB MFR BLDA: 96.1 % (ref 94–97)
P AXIS: 59 DEGREES
PCO2 BLDA: 34.9 MM HG (ref 36–44)
PH BLDA: 7.43 [PH] (ref 7.35–7.45)
PLATELET # BLD AUTO: 233 THOUSANDS/UL (ref 149–390)
PMV BLD AUTO: 9.9 FL (ref 8.9–12.7)
PO2 BLDA: 243.1 MM HG (ref 75–129)
POTASSIUM SERPL-SCNC: 4.3 MMOL/L (ref 3.5–5.3)
PR INTERVAL: 156 MS
PROT SERPL-MCNC: 7.6 G/DL (ref 6.4–8.2)
QRS AXIS: 67 DEGREES
QRSD INTERVAL: 94 MS
QT INTERVAL: 370 MS
QTC INTERVAL: 407 MS
RBC # BLD AUTO: 5.34 MILLION/UL (ref 3.88–5.62)
SODIUM SERPL-SCNC: 135 MMOL/L (ref 136–145)
SPECIMEN SOURCE: ABNORMAL
T WAVE AXIS: 48 DEGREES
TROPONIN I SERPL-MCNC: <0.02 NG/ML
VENTRICULAR RATE: 73 BPM
WBC # BLD AUTO: 7.08 THOUSAND/UL (ref 4.31–10.16)

## 2019-04-22 PROCEDURE — 96365 THER/PROPH/DIAG IV INF INIT: CPT

## 2019-04-22 PROCEDURE — 85025 COMPLETE CBC W/AUTO DIFF WBC: CPT | Performed by: EMERGENCY MEDICINE

## 2019-04-22 PROCEDURE — 99285 EMERGENCY DEPT VISIT HI MDM: CPT

## 2019-04-22 PROCEDURE — 96366 THER/PROPH/DIAG IV INF ADDON: CPT

## 2019-04-22 PROCEDURE — 96375 TX/PRO/DX INJ NEW DRUG ADDON: CPT

## 2019-04-22 PROCEDURE — 82805 BLOOD GASES W/O2 SATURATION: CPT | Performed by: EMERGENCY MEDICINE

## 2019-04-22 PROCEDURE — 82009 KETONE BODYS QUAL: CPT | Performed by: EMERGENCY MEDICINE

## 2019-04-22 PROCEDURE — 93005 ELECTROCARDIOGRAM TRACING: CPT

## 2019-04-22 PROCEDURE — 36600 WITHDRAWAL OF ARTERIAL BLOOD: CPT

## 2019-04-22 PROCEDURE — 96376 TX/PRO/DX INJ SAME DRUG ADON: CPT

## 2019-04-22 PROCEDURE — 82948 REAGENT STRIP/BLOOD GLUCOSE: CPT

## 2019-04-22 PROCEDURE — 36415 COLL VENOUS BLD VENIPUNCTURE: CPT | Performed by: EMERGENCY MEDICINE

## 2019-04-22 PROCEDURE — 80053 COMPREHEN METABOLIC PANEL: CPT | Performed by: EMERGENCY MEDICINE

## 2019-04-22 PROCEDURE — 99284 EMERGENCY DEPT VISIT MOD MDM: CPT | Performed by: EMERGENCY MEDICINE

## 2019-04-22 PROCEDURE — 93010 ELECTROCARDIOGRAM REPORT: CPT | Performed by: INTERNAL MEDICINE

## 2019-04-22 PROCEDURE — 84484 ASSAY OF TROPONIN QUANT: CPT | Performed by: EMERGENCY MEDICINE

## 2019-04-22 PROCEDURE — 71046 X-RAY EXAM CHEST 2 VIEWS: CPT

## 2019-04-22 PROCEDURE — 96361 HYDRATE IV INFUSION ADD-ON: CPT

## 2019-04-22 RX ORDER — ALBUTEROL SULFATE 2.5 MG/3ML
5 SOLUTION RESPIRATORY (INHALATION) ONCE
Status: COMPLETED | OUTPATIENT
Start: 2019-04-22 | End: 2019-04-22

## 2019-04-22 RX ORDER — ALBUTEROL SULFATE 90 UG/1
2 AEROSOL, METERED RESPIRATORY (INHALATION) ONCE
Status: COMPLETED | OUTPATIENT
Start: 2019-04-22 | End: 2019-04-22

## 2019-04-22 RX ORDER — AZITHROMYCIN 250 MG/1
TABLET, FILM COATED ORAL
Qty: 6 TABLET | Refills: 0 | Status: SHIPPED | OUTPATIENT
Start: 2019-04-22 | End: 2019-04-26

## 2019-04-22 RX ORDER — AZITHROMYCIN 250 MG/1
TABLET, FILM COATED ORAL
Qty: 6 TABLET | Refills: 0 | Status: SHIPPED | OUTPATIENT
Start: 2019-04-22 | End: 2019-04-22 | Stop reason: SDUPTHER

## 2019-04-22 RX ADMIN — ALBUTEROL SULFATE 2 PUFF: 90 AEROSOL, METERED RESPIRATORY (INHALATION) at 14:59

## 2019-04-22 RX ADMIN — SODIUM CHLORIDE 1000 ML: 0.9 INJECTION, SOLUTION INTRAVENOUS at 10:09

## 2019-04-22 RX ADMIN — IPRATROPIUM BROMIDE 0.5 MG: 0.5 SOLUTION RESPIRATORY (INHALATION) at 10:11

## 2019-04-22 RX ADMIN — SODIUM CHLORIDE 1000 ML: 0.9 INJECTION, SOLUTION INTRAVENOUS at 12:37

## 2019-04-22 RX ADMIN — ALBUTEROL SULFATE 5 MG: 2.5 SOLUTION RESPIRATORY (INHALATION) at 10:11

## 2019-04-22 RX ADMIN — INSULIN HUMAN 10 UNITS: 100 INJECTION, SOLUTION PARENTERAL at 10:14

## 2019-04-22 RX ADMIN — AZITHROMYCIN MONOHYDRATE 500 MG: 500 INJECTION, POWDER, LYOPHILIZED, FOR SOLUTION INTRAVENOUS at 12:37

## 2019-04-22 RX ADMIN — INSULIN HUMAN 10 UNITS: 100 INJECTION, SOLUTION PARENTERAL at 12:43

## 2019-05-15 ENCOUNTER — ANESTHESIA EVENT (OUTPATIENT)
Dept: GASTROENTEROLOGY | Facility: HOSPITAL | Age: 54
End: 2019-05-15

## 2019-05-15 ENCOUNTER — HOSPITAL ENCOUNTER (EMERGENCY)
Facility: HOSPITAL | Age: 54
Discharge: HOME | End: 2019-05-15
Attending: EMERGENCY MEDICINE
Payer: COMMERCIAL

## 2019-05-15 VITALS
OXYGEN SATURATION: 95 % | BODY MASS INDEX: 27.75 KG/M2 | HEIGHT: 71 IN | TEMPERATURE: 98.9 F | RESPIRATION RATE: 18 BRPM | HEART RATE: 99 BPM | WEIGHT: 198.19 LBS | DIASTOLIC BLOOD PRESSURE: 84 MMHG | SYSTOLIC BLOOD PRESSURE: 134 MMHG

## 2019-05-15 DIAGNOSIS — S61.219A FINGER LACERATION: Primary | ICD-10-CM

## 2019-05-15 PROCEDURE — 90471 IMMUNIZATION ADMIN: CPT

## 2019-05-15 PROCEDURE — 12002 RPR S/N/AX/GEN/TRNK2.6-7.5CM: CPT | Performed by: EMERGENCY MEDICINE

## 2019-05-15 PROCEDURE — 90715 TDAP VACCINE 7 YRS/> IM: CPT | Performed by: EMERGENCY MEDICINE

## 2019-05-15 PROCEDURE — 99282 EMERGENCY DEPT VISIT SF MDM: CPT

## 2019-05-15 PROCEDURE — 99282 EMERGENCY DEPT VISIT SF MDM: CPT | Performed by: EMERGENCY MEDICINE

## 2019-05-15 RX ORDER — LIDOCAINE HYDROCHLORIDE 10 MG/ML
5 INJECTION, SOLUTION EPIDURAL; INFILTRATION; INTRACAUDAL; PERINEURAL ONCE
Status: COMPLETED | OUTPATIENT
Start: 2019-05-15 | End: 2019-05-15

## 2019-05-15 RX ADMIN — LIDOCAINE HYDROCHLORIDE 5 ML: 10 INJECTION, SOLUTION EPIDURAL; INFILTRATION; INTRACAUDAL; PERINEURAL at 20:09

## 2019-05-15 RX ADMIN — TETANUS TOXOID, REDUCED DIPHTHERIA TOXOID AND ACELLULAR PERTUSSIS VACCINE, ADSORBED 0.5 ML: 5; 2.5; 8; 8; 2.5 SUSPENSION INTRAMUSCULAR at 20:09

## 2019-05-16 ENCOUNTER — ANESTHESIA (OUTPATIENT)
Dept: GASTROENTEROLOGY | Facility: HOSPITAL | Age: 54
End: 2019-05-16

## 2019-05-16 ENCOUNTER — HOSPITAL ENCOUNTER (OUTPATIENT)
Dept: GASTROENTEROLOGY | Facility: HOSPITAL | Age: 54
Setting detail: OUTPATIENT SURGERY
Discharge: HOME | End: 2019-05-16
Attending: INTERNAL MEDICINE | Admitting: INTERNAL MEDICINE
Payer: COMMERCIAL

## 2019-05-16 VITALS
OXYGEN SATURATION: 96 % | WEIGHT: 179.23 LBS | BODY MASS INDEX: 25.09 KG/M2 | HEART RATE: 81 BPM | HEIGHT: 71 IN | SYSTOLIC BLOOD PRESSURE: 114 MMHG | TEMPERATURE: 97.7 F | DIASTOLIC BLOOD PRESSURE: 77 MMHG | RESPIRATION RATE: 20 BRPM

## 2019-05-16 DIAGNOSIS — K59.00 CONSTIPATION: ICD-10-CM

## 2019-05-16 DIAGNOSIS — R10.13 EPIGASTRIC PAIN: ICD-10-CM

## 2019-05-16 LAB — GLUCOSE SERPL-MCNC: 247 MG/DL (ref 65–140)

## 2019-05-16 PROCEDURE — 82948 REAGENT STRIP/BLOOD GLUCOSE: CPT

## 2019-05-16 PROCEDURE — 43235 EGD DIAGNOSTIC BRUSH WASH: CPT | Performed by: INTERNAL MEDICINE

## 2019-05-16 PROCEDURE — 45385 COLONOSCOPY W/LESION REMOVAL: CPT | Performed by: INTERNAL MEDICINE

## 2019-05-16 PROCEDURE — 88305 TISSUE EXAM BY PATHOLOGIST: CPT | Performed by: PATHOLOGY

## 2019-05-16 PROCEDURE — NC001 PR NO CHARGE: Performed by: INTERNAL MEDICINE

## 2019-05-16 PROCEDURE — 45380 COLONOSCOPY AND BIOPSY: CPT | Performed by: INTERNAL MEDICINE

## 2019-05-16 RX ORDER — SODIUM CHLORIDE, SODIUM LACTATE, POTASSIUM CHLORIDE, CALCIUM CHLORIDE 600; 310; 30; 20 MG/100ML; MG/100ML; MG/100ML; MG/100ML
125 INJECTION, SOLUTION INTRAVENOUS CONTINUOUS
Status: DISCONTINUED | OUTPATIENT
Start: 2019-05-16 | End: 2019-05-20 | Stop reason: HOSPADM

## 2019-05-16 RX ORDER — PROPOFOL 10 MG/ML
INJECTION, EMULSION INTRAVENOUS AS NEEDED
Status: DISCONTINUED | OUTPATIENT
Start: 2019-05-16 | End: 2019-05-16 | Stop reason: SURG

## 2019-05-16 RX ORDER — ALBUTEROL SULFATE 2.5 MG/3ML
2.5 SOLUTION RESPIRATORY (INHALATION) ONCE
Status: COMPLETED | OUTPATIENT
Start: 2019-05-16 | End: 2019-05-16

## 2019-05-16 RX ORDER — LIDOCAINE HYDROCHLORIDE 10 MG/ML
INJECTION, SOLUTION INFILTRATION; PERINEURAL AS NEEDED
Status: DISCONTINUED | OUTPATIENT
Start: 2019-05-16 | End: 2019-05-16 | Stop reason: SURG

## 2019-05-16 RX ADMIN — PROPOFOL 50 MG: 10 INJECTION, EMULSION INTRAVENOUS at 07:52

## 2019-05-16 RX ADMIN — PHENYLEPHRINE HYDROCHLORIDE 100 MCG: 10 INJECTION INTRAVENOUS at 08:08

## 2019-05-16 RX ADMIN — PROPOFOL 50 MG: 10 INJECTION, EMULSION INTRAVENOUS at 08:00

## 2019-05-16 RX ADMIN — LIDOCAINE HYDROCHLORIDE 50 MG: 10 INJECTION, SOLUTION INFILTRATION; PERINEURAL at 07:48

## 2019-05-16 RX ADMIN — PROPOFOL 50 MG: 10 INJECTION, EMULSION INTRAVENOUS at 07:56

## 2019-05-16 RX ADMIN — PROPOFOL 50 MG: 10 INJECTION, EMULSION INTRAVENOUS at 07:50

## 2019-05-16 RX ADMIN — PROPOFOL 50 MG: 10 INJECTION, EMULSION INTRAVENOUS at 08:08

## 2019-05-16 RX ADMIN — PHENYLEPHRINE HYDROCHLORIDE 100 MCG: 10 INJECTION INTRAVENOUS at 08:10

## 2019-05-16 RX ADMIN — PROPOFOL 50 MG: 10 INJECTION, EMULSION INTRAVENOUS at 08:03

## 2019-05-16 RX ADMIN — ALBUTEROL SULFATE 2.5 MG: 2.5 SOLUTION RESPIRATORY (INHALATION) at 07:34

## 2019-05-16 RX ADMIN — SODIUM CHLORIDE, SODIUM LACTATE, POTASSIUM CHLORIDE, AND CALCIUM CHLORIDE 125 ML/HR: .6; .31; .03; .02 INJECTION, SOLUTION INTRAVENOUS at 07:27

## 2019-05-16 RX ADMIN — PROPOFOL 100 MG: 10 INJECTION, EMULSION INTRAVENOUS at 07:48

## 2020-01-24 ENCOUNTER — OFFICE VISIT (OUTPATIENT)
Dept: URGENT CARE | Facility: CLINIC | Age: 55
End: 2020-01-24
Payer: COMMERCIAL

## 2020-01-24 ENCOUNTER — HOSPITAL ENCOUNTER (EMERGENCY)
Facility: HOSPITAL | Age: 55
Discharge: HOME | End: 2020-01-24
Attending: EMERGENCY MEDICINE | Admitting: EMERGENCY MEDICINE
Payer: COMMERCIAL

## 2020-01-24 VITALS
WEIGHT: 193 LBS | OXYGEN SATURATION: 96 % | SYSTOLIC BLOOD PRESSURE: 148 MMHG | HEIGHT: 71 IN | BODY MASS INDEX: 27.02 KG/M2 | RESPIRATION RATE: 18 BRPM | DIASTOLIC BLOOD PRESSURE: 84 MMHG | HEART RATE: 87 BPM | TEMPERATURE: 96.9 F

## 2020-01-24 VITALS
HEART RATE: 85 BPM | RESPIRATION RATE: 18 BRPM | BODY MASS INDEX: 27.01 KG/M2 | HEIGHT: 71 IN | DIASTOLIC BLOOD PRESSURE: 75 MMHG | TEMPERATURE: 97.6 F | WEIGHT: 192.9 LBS | OXYGEN SATURATION: 99 % | SYSTOLIC BLOOD PRESSURE: 128 MMHG

## 2020-01-24 DIAGNOSIS — R73.9 ELEVATED BLOOD SUGAR: ICD-10-CM

## 2020-01-24 DIAGNOSIS — R73.9 HYPERGLYCEMIA: Primary | ICD-10-CM

## 2020-01-24 DIAGNOSIS — R06.02 SHORTNESS OF BREATH: Primary | ICD-10-CM

## 2020-01-24 LAB
ALBUMIN SERPL BCP-MCNC: 3.5 G/DL (ref 3.5–5)
ALP SERPL-CCNC: 102 U/L (ref 46–116)
ALT SERPL W P-5'-P-CCNC: 33 U/L (ref 12–78)
ANION GAP SERPL CALCULATED.3IONS-SCNC: 10 MMOL/L (ref 4–13)
AST SERPL W P-5'-P-CCNC: 18 U/L (ref 5–45)
BASOPHILS # BLD AUTO: 0.03 THOUSANDS/ΜL (ref 0–0.1)
BASOPHILS NFR BLD AUTO: 0 % (ref 0–1)
BILIRUB DIRECT SERPL-MCNC: 0.07 MG/DL (ref 0–0.2)
BILIRUB SERPL-MCNC: 0.4 MG/DL (ref 0.2–1)
BUN SERPL-MCNC: 19 MG/DL (ref 5–25)
CALCIUM SERPL-MCNC: 8.9 MG/DL (ref 8.3–10.1)
CHLORIDE SERPL-SCNC: 100 MMOL/L (ref 100–108)
CO2 SERPL-SCNC: 26 MMOL/L (ref 21–32)
CREAT SERPL-MCNC: 1.1 MG/DL (ref 0.6–1.3)
EOSINOPHIL # BLD AUTO: 0.16 THOUSAND/ΜL (ref 0–0.61)
EOSINOPHIL NFR BLD AUTO: 2 % (ref 0–6)
ERYTHROCYTE [DISTWIDTH] IN BLOOD BY AUTOMATED COUNT: 13.2 % (ref 11.6–15.1)
GFR SERPL CREATININE-BSD FRML MDRD: 88 ML/MIN/1.73SQ M
GLUCOSE SERPL-MCNC: 236 MG/DL (ref 65–140)
GLUCOSE SERPL-MCNC: 245 MG/DL (ref 65–140)
GLUCOSE SERPL-MCNC: 306 MG/DL (ref 65–140)
HCT VFR BLD AUTO: 46.8 % (ref 36.5–49.3)
HGB BLD-MCNC: 15.8 G/DL (ref 12–17)
IMM GRANULOCYTES # BLD AUTO: 0.03 THOUSAND/UL (ref 0–0.2)
IMM GRANULOCYTES NFR BLD AUTO: 0 % (ref 0–2)
LYMPHOCYTES # BLD AUTO: 3.26 THOUSANDS/ΜL (ref 0.6–4.47)
LYMPHOCYTES NFR BLD AUTO: 49 % (ref 14–44)
MCH RBC QN AUTO: 29.8 PG (ref 26.8–34.3)
MCHC RBC AUTO-ENTMCNC: 33.8 G/DL (ref 31.4–37.4)
MCV RBC AUTO: 88 FL (ref 82–98)
MONOCYTES # BLD AUTO: 0.48 THOUSAND/ΜL (ref 0.17–1.22)
MONOCYTES NFR BLD AUTO: 7 % (ref 4–12)
NEUTROPHILS # BLD AUTO: 2.83 THOUSANDS/ΜL (ref 1.85–7.62)
NEUTS SEG NFR BLD AUTO: 42 % (ref 43–75)
NRBC BLD AUTO-RTO: 0 /100 WBCS
PLATELET # BLD AUTO: 284 THOUSANDS/UL (ref 149–390)
PMV BLD AUTO: 9.8 FL (ref 8.9–12.7)
POTASSIUM SERPL-SCNC: 4 MMOL/L (ref 3.5–5.3)
PROT SERPL-MCNC: 7.8 G/DL (ref 6.4–8.2)
RBC # BLD AUTO: 5.3 MILLION/UL (ref 3.88–5.62)
SL AMB  POCT GLUCOSE, UA: 2000
SL AMB LEUKOCYTE ESTERASE,UA: ABNORMAL
SL AMB POCT BILIRUBIN,UA: ABNORMAL
SL AMB POCT BLOOD,UA: ABNORMAL
SL AMB POCT CLARITY,UA: ABNORMAL
SL AMB POCT COLOR,UA: CLEAR
SL AMB POCT KETONES,UA: ABNORMAL
SL AMB POCT NITRITE,UA: ABNORMAL
SL AMB POCT PH,UA: 5
SL AMB POCT SPECIFIC GRAVITY,UA: 1.01
SL AMB POCT URINE PROTEIN: ABNORMAL
SL AMB POCT UROBILINOGEN: 0.2
SODIUM SERPL-SCNC: 136 MMOL/L (ref 136–145)
WBC # BLD AUTO: 6.79 THOUSAND/UL (ref 4.31–10.16)

## 2020-01-24 PROCEDURE — 82948 REAGENT STRIP/BLOOD GLUCOSE: CPT

## 2020-01-24 PROCEDURE — 99284 EMERGENCY DEPT VISIT MOD MDM: CPT | Performed by: EMERGENCY MEDICINE

## 2020-01-24 PROCEDURE — 80048 BASIC METABOLIC PNL TOTAL CA: CPT | Performed by: EMERGENCY MEDICINE

## 2020-01-24 PROCEDURE — 85025 COMPLETE CBC W/AUTO DIFF WBC: CPT | Performed by: EMERGENCY MEDICINE

## 2020-01-24 PROCEDURE — 80076 HEPATIC FUNCTION PANEL: CPT | Performed by: EMERGENCY MEDICINE

## 2020-01-24 PROCEDURE — 99283 EMERGENCY DEPT VISIT LOW MDM: CPT | Performed by: PHYSICIAN ASSISTANT

## 2020-01-24 PROCEDURE — 81002 URINALYSIS NONAUTO W/O SCOPE: CPT | Performed by: PHYSICIAN ASSISTANT

## 2020-01-24 PROCEDURE — 36415 COLL VENOUS BLD VENIPUNCTURE: CPT | Performed by: EMERGENCY MEDICINE

## 2020-01-24 PROCEDURE — 82948 REAGENT STRIP/BLOOD GLUCOSE: CPT | Performed by: PHYSICIAN ASSISTANT

## 2020-01-24 PROCEDURE — G0382 LEV 3 HOSP TYPE B ED VISIT: HCPCS | Performed by: PHYSICIAN ASSISTANT

## 2020-01-24 PROCEDURE — 96372 THER/PROPH/DIAG INJ SC/IM: CPT

## 2020-01-24 PROCEDURE — 99285 EMERGENCY DEPT VISIT HI MDM: CPT

## 2020-01-24 PROCEDURE — 96360 HYDRATION IV INFUSION INIT: CPT

## 2020-01-24 RX ADMIN — INSULIN LISPRO 15 UNITS: 100 INJECTION, SOLUTION INTRAVENOUS; SUBCUTANEOUS at 20:52

## 2020-01-24 RX ADMIN — SODIUM CHLORIDE 1000 ML: 0.9 INJECTION, SOLUTION INTRAVENOUS at 20:13

## 2020-01-24 NOTE — PROGRESS NOTES
[facility]        NAME: Hilda Nettles is a 49 y o  male  : 1965    MRN: [id number]  DATE: 2020  TIME: 6:33 PM    Assessment and Plan   Shortness of breath [R06 02]  1  Shortness of breath  POCT urine dip    Transfer to other facility   2  Elevated blood sugar  POCT urine dip    Transfer to other facility     Urine dip with positive Glucose 2000+    Patient being transported to [facility] via [facility]    Patient Instructions     Follow up with PCP in 3-5 days  Proceed to  ER if symptoms worsen  Chief Complaint     Chief Complaint   Patient presents with    Dizziness     Pt reports blood sugar of 490 earlier today  He took his blood sugar again approx 30 mins ago and rec'd a reading of 320  Pt reports feeling dizzy, shaky and extremely thristy  reports symptoms for a about a couple weeks, but today was the worst     Hyperglycemia    Polydipsia         History of Present Illness       13-year-old male with past medical history of diabetes type 2 presents for evaluation of elevated blood sugar read at home  Patient states at home he had an episode of dizziness today  He checked his sugar and he had a read of 490  Patient states that he has noncompliant with his regimen  Patient complains of shortness of breath lightheadedness  He had a fall 4 months ago where he hit his head  He states he was not evaluated for this  C/o excessive thirst and urinary frequency  He denies any chest pain or palpitations  Review of Systems   Review of Systems   Constitutional: Negative for chills, fatigue and fever  HENT: Negative for congestion, ear pain, sinus pain, sore throat and trouble swallowing  Eyes: Negative for pain, discharge and redness  Respiratory: Positive for shortness of breath  Negative for cough, chest tightness and wheezing  Cardiovascular: Negative for chest pain, palpitations and leg swelling     Gastrointestinal: Negative for abdominal pain, diarrhea, nausea and vomiting  Musculoskeletal: Negative for arthralgias, joint swelling and myalgias  Skin: Negative for rash  Neurological: Positive for dizziness and light-headedness  Negative for syncope, weakness, numbness and headaches  Current Medications       Current Outpatient Medications:     albuterol (PROVENTIL HFA,VENTOLIN HFA) 90 mcg/act inhaler, Inhale 2 puffs every 4 (four) hours as needed for wheezing, Disp: 1 Inhaler, Rfl: 0    atorvastatin (LIPITOR) 40 mg tablet, TAKE 1 TABLET BY MOUTH EVERY DAY AT NIGHT, Disp: , Rfl: 3    budesonide (PULMICORT) 0 25 mg/2 mL nebulizer solution, Inhale 0 25 mg, Disp: , Rfl:     Insulin Pen Needle (PEN NEEDLES 29GX1/2") 29G X 12MM MISC, 1 Device by Does not apply route, Disp: , Rfl:     Insulin Syringe-Needle U-100 31G X 1/4" 0 5 ML MISC, 1 Syringe by Does not apply route, Disp: , Rfl:     Lancet Devices (LANCING DEVICE) MISC, Inject 1 Device under the skin, Disp: , Rfl:     lisinopril (ZESTRIL) 20 mg tablet, Take 20 mg by mouth daily, Disp: , Rfl:     metFORMIN (GLUCOPHAGE) 1000 MG tablet, Take 1,000 mg by mouth 2 (two) times a day with meals  , Disp: , Rfl:     fluticasone-vilanterol (BREO ELLIPTA) 200-25 MCG/INH inhaler, Inhale 1 puff, Disp: , Rfl:     insulin lispro protamine-insulin lispro (HumaLOG 75/25) 100 units/mL, Inject 15 Units under the skin , Disp: , Rfl:     lisinopril-hydrochlorothiazide (PRINZIDE,ZESTORETIC) 20-25 MG per tablet, Take 1 tablet by mouth, Disp: , Rfl:     naproxen (NAPROSYN) 500 mg tablet, Take 1 tablet by mouth every 12 (twelve) hours, Disp: , Rfl:     nicotine (NICODERM CQ) 21 mg/24 hr TD 24 hr patch, Place 1 patch on the skin daily (Patient not taking: Reported on 1/24/2020), Disp: 28 patch, Rfl: 0    Current Allergies     Allergies as of 01/24/2020    (No Known Allergies)            The following portions of the patient's history were reviewed and updated as appropriate: allergies, current medications, past family history, past medical history, past social history, past surgical history and problem list      Past Medical History:   Diagnosis Date    Asthma     Cardiac murmur     Diabetes mellitus ([address] )     Hyperlipidemia     Hypertension        Past Surgical History:   Procedure Laterality Date    APPENDECTOMY      teenager        Family History   Problem Relation Age of Onset    Hypertension Mother     Hypertension Father          Medications have been verified  Objective   /84   Pulse 87   Temp (!) 96 9 °F (36 1 °C)   Resp 18   Ht 5' 11" (1 803 m)   Wt 87 5 kg (193 lb)   SpO2 96%   BMI 26 92 kg/m²        Physical Exam     Physical Exam   Constitutional: He is oriented to person, place, and time  He appears well-developed and well-nourished  No distress  HENT:   Head: Normocephalic  Right Ear: External ear normal    Left Ear: External ear normal    Mouth/Throat: Oropharynx is clear and moist    Eyes: Pupils are equal, round, and reactive to light  Conjunctivae and EOM are normal    Neck: Normal range of motion  Neck supple  Cardiovascular: Normal rate, regular rhythm and normal heart sounds  No murmur heard  Pulmonary/Chest: Effort normal and breath sounds normal  No respiratory distress  He has no wheezes  Abdominal: Soft  Bowel sounds are normal  There is no tenderness  Musculoskeletal: Normal range of motion  Lymphadenopathy:     He has no cervical adenopathy  Neurological: He is alert and oriented to person, place, and time  He has normal reflexes  Skin: Skin is warm and dry  Psychiatric: He has a normal mood and affect  Nursing note and vitals reviewed

## 2020-01-25 NOTE — ED PROVIDER NOTES
History  Chief Complaint   Patient presents with    Hyperglycemia - Symptomatic     Dizziness, tingling, pain shooting through body, blood glucose was 490 at 1600, when EMS was called it was 270, pt is insulin dependant and does not take it as he is supposed to  HPI patient is a 60-year-old male, he reports that he is running out of insulin so decided to rash in it, he reports tonight his blood sugar became elevated, and apparently was 490  Patient reported feeling tingling in his extremities and decided to call EMS  EMS reports his blood sugar was 270  Patient reports he had not eaten before calling EMS  He reports that he has insulin but apparently he called his provider and they are not in until Monday  Patient reports he has had a rash is insulin over the last few days and was concerned  Patient reports a dry mouth and frequent urination  Denies any loss of consciousness  Denies any headache  Denies any focal weakness  Primarily reports feeling washed out and somewhat weak in general   Past medical history of insulin-dependent diabetes, hypertension, asthma  Family history noncontributory  Social history smoker, denies drug abuse    Prior to Admission Medications   Prescriptions Last Dose Informant Patient Reported? Taking?    Insulin Pen Needle (PEN NEEDLES 29GX1/2") 29G X 12MM MISC  Self Yes No   Si Device by Does not apply route   Insulin Syringe-Needle U-100 31G X 1/4" 0 5 ML MISC  Self Yes No   Si Syringe by Does not apply route   Lancet Devices (LANCING DEVICE) MISC  Self Yes No   Sig: Inject 1 Device under the skin   albuterol (PROVENTIL HFA,VENTOLIN HFA) 90 mcg/act inhaler  Self No No   Sig: Inhale 2 puffs every 4 (four) hours as needed for wheezing   atorvastatin (LIPITOR) 40 mg tablet  Self Yes No   Sig: TAKE 1 TABLET BY MOUTH EVERY DAY AT NIGHT   budesonide (PULMICORT) 0 25 mg/2 mL nebulizer solution  Self Yes No   Sig: Inhale 0 25 mg   fluticasone-vilanterol (BREO ELLIPTA) 200-25 MCG/INH inhaler  Self Yes No   Sig: Inhale 1 puff   insulin lispro protamine-insulin lispro (HumaLOG 75/25) 100 units/mL  Self Yes No   Sig: Inject 15 Units under the skin    lisinopril (ZESTRIL) 20 mg tablet  Self Yes No   Sig: Take 20 mg by mouth daily   lisinopril-hydrochlorothiazide (PRINZIDE,ZESTORETIC) 20-25 MG per tablet  Self Yes No   Sig: Take 1 tablet by mouth   metFORMIN (GLUCOPHAGE) 1000 MG tablet  Self Yes No   Sig: Take 1,000 mg by mouth 2 (two) times a day with meals  naproxen (NAPROSYN) 500 mg tablet  Self Yes No   Sig: Take 1 tablet by mouth every 12 (twelve) hours   nicotine (NICODERM CQ) 21 mg/24 hr TD 24 hr patch  Self No No   Sig: Place 1 patch on the skin daily   Patient not taking: Reported on 1/24/2020      Facility-Administered Medications: None       Past Medical History:   Diagnosis Date    Asthma     Cardiac murmur     [facility]     Diabetes mellitus ([facility] )     Hyperlipidemia     Hypertension        Past Surgical History:   Procedure Laterality Date    APPENDECTOMY      teenager        Family History   Problem Relation Age of Onset    Hypertension Mother     Hypertension Father      I have reviewed and agree with the history as documented  Social History     Tobacco Use    Smoking status: Current Every Day Smoker     Packs/day: 1 00     Years: 25 00     Pack years: 25 00     Types: Cigarettes    Smokeless tobacco: Current User   Substance Use Topics    Alcohol use: No     Comment: occassional    Drug use: No        Review of Systems   Constitutional: Negative for diaphoresis, fatigue and fever  HENT: Negative for congestion, ear pain, nosebleeds and sore throat  Eyes: Negative for photophobia, pain, discharge and visual disturbance  Respiratory: Negative for cough, choking, chest tightness, shortness of breath and wheezing  Cardiovascular: Negative for chest pain and palpitations     Gastrointestinal: Negative for abdominal distention, abdominal pain, diarrhea and vomiting  Endocrine: Positive for polydipsia and polyuria  Genitourinary: Negative for dysuria, flank pain and frequency  Musculoskeletal: Negative for back pain, gait problem and joint swelling  Skin: Negative for color change and rash  Neurological: Positive for weakness  Negative for dizziness, syncope and headaches  Psychiatric/Behavioral: Negative for behavioral problems and confusion  The patient is not nervous/anxious  All other systems reviewed and are negative  Physical Exam  Physical Exam   Constitutional: He is oriented to person, place, and time  He appears well-developed and well-nourished  HENT:   Head: Normocephalic  Right Ear: External ear normal    Left Ear: External ear normal    Nose: Nose normal    Mouth/Throat: Oropharynx is clear and moist    Eyes: Pupils are equal, round, and reactive to light  EOM and lids are normal    Neck: Normal range of motion  Neck supple  Cardiovascular: Normal rate, regular rhythm, normal heart sounds and intact distal pulses  Pulmonary/Chest: Effort normal and breath sounds normal  No respiratory distress  Abdominal: Soft  Bowel sounds are normal  There is tenderness  Musculoskeletal: Normal range of motion  He exhibits no deformity  Neurological: He is alert and oriented to person, place, and time  Skin: Skin is warm and dry  Psychiatric: He has a normal mood and affect  Nursing note and vitals reviewed    Pulse oximetry normal at 97% adequate oxygenation, there is no hypoxia    Vital Signs  ED Triage Vitals [01/24/20 1859]   Temperature Pulse Respirations Blood Pressure SpO2   97 6 °F (36 4 °C) 87 18 130/78 97 %      Temp Source Heart Rate Source Patient Position - Orthostatic VS BP Location FiO2 (%)   Oral Monitor Sitting Right arm --      Pain Score       4           Vitals:    01/24/20 1859 01/24/20 2142   BP: 130/78 128/75   Pulse: 87 85   Patient Position - Orthostatic VS: Sitting          Visual Acuity      ED Medications  Medications   sodium chloride 0 9 % bolus 1,000 mL (0 mL Intravenous Stopped 1/24/20 2141)   insulin lispro (HumaLOG) 100 units/mL subcutaneous injection 15 Units (15 Units Subcutaneous Given 1/24/20 2052)       Diagnostic Studies  Results Reviewed     Procedure Component Value Units Date/Time    Basic metabolic panel [[id number]]  (Abnormal) Collected:  01/24/20 2010    Lab Status:  Final result Specimen:  Blood from Arm, Right Updated:  01/24/20 2042     Sodium 136 mmol/L      Potassium 4 0 mmol/L      Chloride 100 mmol/L      CO2 26 mmol/L      ANION GAP 10 mmol/L      BUN 19 mg/dL      Creatinine 1 10 mg/dL      Glucose 245 mg/dL      Calcium 8 9 mg/dL      eGFR 88 ml/min/1 73sq m     Narrative:       [place] guidelines for Chronic Kidney Disease (CKD):     Stage 1 with normal or high GFR (GFR > 90 mL/min/1 73 square meters)    Stage 2 Mild CKD (GFR = 60-89 mL/min/1 73 square meters)    Stage 3A Moderate CKD (GFR = 45-59 mL/min/1 73 square meters)    Stage 3B Moderate CKD (GFR = 30-44 mL/min/1 73 square meters)    Stage 4 Severe CKD (GFR = 15-29 mL/min/1 73 square meters)    Stage 5 End Stage CKD (GFR <15 mL/min/1 73 square meters)  Note: GFR calculation is accurate only with a steady state creatinine    Hepatic function panel [[id number]]  (Normal) Collected:  01/24/20 2010    Lab Status:  Final result Specimen:  Blood from Arm, Right Updated:  01/24/20 2042     Total Bilirubin 0 40 mg/dL      Bilirubin, Direct 0 07 mg/dL      Alkaline Phosphatase 102 U/L      AST 18 U/L      ALT 33 U/L      Total Protein 7 8 g/dL      Albumin 3 5 g/dL     CBC and differential [[id number]]  (Abnormal) Collected:  01/24/20 2010    Lab Status:  Final result Specimen:  Blood from Arm, Right Updated:  01/24/20 2024     WBC 6 79 Thousand/uL      RBC 5 30 Million/uL      Hemoglobin 15 8 g/dL      Hematocrit 46 8 %      MCV 88 fL      MCH 29 8 pg MCHC 33 8 g/dL      RDW 13 2 %      MPV 9 8 fL      Platelets 061 Thousands/uL      nRBC 0 /100 WBCs      Neutrophils Relative 42 %      Immat GRANS % 0 %      Lymphocytes Relative 49 %      Monocytes Relative 7 %      Eosinophils Relative 2 %      Basophils Relative 0 %      Neutrophils Absolute 2 83 Thousands/µL      Immature Grans Absolute 0 03 Thousand/uL      Lymphocytes Absolute 3 26 Thousands/µL      Monocytes Absolute 0 48 Thousand/µL      Eosinophils Absolute 0 16 Thousand/µL      Basophils Absolute 0 03 Thousands/µL     Fingerstick Glucose (POCT) [[id number]]  (Abnormal) Collected:  01/24/20 1912    Lab Status:  Final result Updated:  01/24/20 1913     POC Glucose 236 mg/dl                  No orders to display              Procedures  Procedures         ED Course        patient's symptoms had primarily resolved on arrival but the patient was concerned because he has rationing his insulin  Discussed with patient will give him his current insulin dose and will evaluate him for electrolyte disturbance  diagnostic testing showed a normal electrolytes other than a blood sugar 245, normal BUN creatinine no sign of renal dysfunction, liver functions were normal   Electrolytes showed no anion gap, no sign of diabetic ketoacidosis  white count was normal at 6 7 no sign of inflammation, hemoglobin was normal at 15 no sign of anemia  Patient felt markedly improved after hydration  Discussed he should take his insulin as directed which he reports his t i d  [facility] medical decision making 23-year-old male, apparently running out of insulin and trying to ration is insulin presents the hospital apparently had blood sugar 490 at home, improved here with hydration and medication  Discussed restarting his insulin that the normal dosing interval   Discussed follow-up discussed indications to return    Patient asymptomatic on discharge      Disposition  Final diagnoses:   Hyperglycemia     Time reflects when diagnosis was documented in both MDM as applicable and the Disposition within this note     Time User Action Codes Description Comment    1/24/2020  8:57 PM Luh Soni Add [R73 9] Hyperglycemia       ED Disposition     ED Disposition Condition Date/Time Comment    Discharge Stable Fri Jan 24, 2020  8:57 PM Junaid Carrington discharge to home/self care  Follow-up Information     Follow up With Specialties Details Why [address] Internal Medicine   [address]            Discharge Medication List as of 1/24/2020  8:59 PM      CONTINUE these medications which have CHANGED    Details   insulin lispro protamine-insulin lispro (HumaLOG 75/25) 100 units/mL Inject 15 Units under the skin 3 (three) times a day for 10 days, Starting Fri 1/24/2020, Until Mon 2/3/2020, Print         CONTINUE these medications which have NOT CHANGED    Details   albuterol (PROVENTIL HFA,VENTOLIN HFA) 90 mcg/act inhaler Inhale 2 puffs every 4 (four) hours as needed for wheezing, Starting Mon 9/17/2018, Print      atorvastatin (LIPITOR) 40 mg tablet TAKE 1 TABLET BY MOUTH EVERY DAY AT NIGHT, Historical Med      budesonide (PULMICORT) 0 25 mg/2 mL nebulizer solution Inhale 0 25 mg, Starting Mon 3/4/2019, Until Tue 3/3/2020, Historical Med      fluticasone-vilanterol (BREO ELLIPTA) 200-25 MCG/INH inhaler Inhale 1 puff, Starting Tue 12/11/2018, Historical Med      Insulin Pen Needle (PEN NEEDLES 29GX1/2") 29G X 12MM MISC 1 Device by Does not apply route, Starting Thu 11/30/2017, Historical Med      Insulin Syringe-Needle U-100 31G X 1/4" 0 5 ML MISC 1 Syringe by Does not apply route, Starting Mon 10/8/2018, Historical Med      Lancet Devices (LANCING DEVICE) MISC Inject 1 Device under the skin, Starting Mon 3/5/2018, Historical Med      lisinopril (ZESTRIL) 20 mg tablet Take 20 mg by mouth daily, Historical Med      lisinopril-hydrochlorothiazide (PRINZIDE,ZESTORETIC) 20-25 MG per tablet Take 1 tablet by mouth, Starting Mon 3/5/2018, Historical Med      metFORMIN (GLUCOPHAGE) 1000 MG tablet Take 1,000 mg by mouth 2 (two) times a day with meals  , Historical Med      naproxen (NAPROSYN) 500 mg tablet Take 1 tablet by mouth every 12 (twelve) hours, Starting Wed 10/5/2016, Historical Med      nicotine (NICODERM CQ) 21 mg/24 hr TD 24 hr patch Place 1 patch on the skin daily, Starting Tue 12/12/2017, Print           No discharge procedures on file      ED Provider  Electronically Signed by           Jose Bergeron MD  01/24/20 0724

## 2020-03-02 ENCOUNTER — OFFICE VISIT (OUTPATIENT)
Dept: URGENT CARE | Facility: CLINIC | Age: 55
End: 2020-03-02
Payer: COMMERCIAL

## 2020-03-02 VITALS
TEMPERATURE: 98.5 F | RESPIRATION RATE: 16 BRPM | SYSTOLIC BLOOD PRESSURE: 126 MMHG | BODY MASS INDEX: 27.3 KG/M2 | DIASTOLIC BLOOD PRESSURE: 86 MMHG | WEIGHT: 195 LBS | HEART RATE: 89 BPM | OXYGEN SATURATION: 94 % | HEIGHT: 71 IN

## 2020-03-02 DIAGNOSIS — M62.838 NECK MUSCLE SPASM: ICD-10-CM

## 2020-03-02 DIAGNOSIS — M79.601 PARESTHESIA AND PAIN OF BOTH UPPER EXTREMITIES: Primary | ICD-10-CM

## 2020-03-02 DIAGNOSIS — R20.2 PARESTHESIA AND PAIN OF BOTH UPPER EXTREMITIES: Primary | ICD-10-CM

## 2020-03-02 DIAGNOSIS — M79.602 PARESTHESIA AND PAIN OF BOTH UPPER EXTREMITIES: Primary | ICD-10-CM

## 2020-03-02 PROCEDURE — G0382 LEV 3 HOSP TYPE B ED VISIT: HCPCS | Performed by: PHYSICIAN ASSISTANT

## 2020-03-02 PROCEDURE — 99213 OFFICE O/P EST LOW 20 MIN: CPT | Performed by: PHYSICIAN ASSISTANT

## 2020-03-02 PROCEDURE — 99283 EMERGENCY DEPT VISIT LOW MDM: CPT | Performed by: PHYSICIAN ASSISTANT

## 2020-03-02 RX ORDER — METHOCARBAMOL 500 MG/1
500 TABLET, FILM COATED ORAL 4 TIMES DAILY
Qty: 40 TABLET | Refills: 0 | Status: SHIPPED | OUTPATIENT
Start: 2020-03-02

## 2020-03-02 NOTE — PROGRESS NOTES
[facility]        NAME: Richard Giang is a 49 y o  male  : 1965    MRN: [id number]  DATE: 2020  TIME: 5:04 PM    Assessment and Plan   Paresthesia and pain of both upper extremities [R20 2, M79 601, M79 602]  1  Paresthesia and pain of both upper extremities     2  Neck muscle spasm  methocarbamol (ROBAXIN) 500 mg tablet         Patient Instructions     Patient Instructions   Patient has an EMG pending  This will be of benefit to gently a level of nerve compression  He likely has some carpal tunnel syndrome  Does have some stiffness in his trapezius and neck  We can try Robaxin for that  Moist heat  Continue anti-inflammatories over-the-counter  Follow-up with PCP for hemoglobin A1c check which she has tomorrow  Follow up with PCP in 3-5 days  Proceed to  ER if symptoms worsen  Chief Complaint     Chief Complaint   Patient presents with    Extremity Weakness     started last week with numbness of R 4th & 5th fingers and then progressed to tips of L 2nd and 3rd fingers  Also c/o b/l arm weakness and some neck pain         History of Present Illness       30-year-old male presents with chronic continued numbness and weakness down both arms  He has been seen for this and has an EMG ordered  He needs a note for work  He feels weakness in his hands  He is diabetic and says his sugars run around 200  He has A1c scheduled for tomorrow  No headache or pain in his neck  Does have some stiffness in his upper back  He says his left hand is numb over the middle and ring fingers  Right hand intermittently numb over the pinky  Review of Systems   Review of Systems   Musculoskeletal: Positive for arthralgias and myalgias  Neurological: Positive for numbness           Current Medications       Current Outpatient Medications:     albuterol (PROVENTIL HFA,VENTOLIN HFA) 90 mcg/act inhaler, Inhale 2 puffs every 4 (four) hours as needed for wheezing, Disp: 1 Inhaler, Rfl: 0    atorvastatin (LIPITOR) 40 mg tablet, TAKE 1 TABLET BY MOUTH EVERY DAY AT NIGHT, Disp: , Rfl: 3    budesonide (PULMICORT) 0 25 mg/2 mL nebulizer solution, Inhale 0 25 mg, Disp: , Rfl:     fluticasone-salmeterol (Advair HFA) 115-21 MCG/ACT inhaler, Inhale 2 puffs 2 (two) times a day, Disp: , Rfl:     insulin lispro protamine-insulin lispro (HumaLOG 75/25) 100 units/mL, Inject 15 Units under the skin , Disp: , Rfl:     lisinopril-hydrochlorothiazide (PRINZIDE,ZESTORETIC) 20-25 MG per tablet, Take 1 tablet by mouth, Disp: , Rfl:     metFORMIN (GLUCOPHAGE) 1000 MG tablet, Take 1,000 mg by mouth 2 (two) times a day with meals  , Disp: , Rfl:     fluticasone-vilanterol (BREO ELLIPTA) 200-25 MCG/INH inhaler, Inhale 1 puff, Disp: , Rfl:     insulin lispro protamine-insulin lispro (HumaLOG 75/25) 100 units/mL, Inject 15 Units under the skin 3 (three) times a day for 10 days, Disp: 20 mL, Rfl: 0    Insulin Pen Needle (PEN NEEDLES 29GX1/2") 29G X 12MM MISC, 1 Device by Does not apply route, Disp: , Rfl:     Insulin Syringe-Needle U-100 31G X 1/4" 0 5 ML MISC, 1 Syringe by Does not apply route, Disp: , Rfl:     Lancet Devices (LANCING DEVICE) MISC, Inject 1 Device under the skin, Disp: , Rfl:     lisinopril (ZESTRIL) 20 mg tablet, Take 20 mg by mouth daily, Disp: , Rfl:     methocarbamol (ROBAXIN) 500 mg tablet, Take 1 tablet (500 mg total) by mouth 4 (four) times a day, Disp: 40 tablet, Rfl: 0    naproxen (NAPROSYN) 500 mg tablet, Take 1 tablet by mouth every 12 (twelve) hours, Disp: , Rfl:     nicotine (NICODERM CQ) 21 mg/24 hr TD 24 hr patch, Place 1 patch on the skin daily (Patient not taking: Reported on 1/24/2020), Disp: 28 patch, Rfl: 0    Current Allergies     Allergies as of 03/02/2020    (No Known Allergies)            The following portions of the patient's history were reviewed and updated as appropriate: allergies, current medications, past family history, past medical history, past social history, past surgical history and problem list      Past Medical History:   Diagnosis Date    Asthma     Cardiac murmur     [facility]     [facility]     Hyperlipidemia     Hypertension        Past Surgical History:   Procedure Laterality Date    APPENDECTOMY      teenager        Family History   Problem Relation Age of Onset    Hypertension Mother     Hypertension Father          Medications have been verified  Objective   /86   Pulse 89   Temp 98 5 °F (36 9 °C) (Oral)   Resp 16   Ht 5' 11" (1 803 m)   Wt 88 5 kg (195 lb)   SpO2 94%   BMI 27 20 kg/m²        Physical Exam     Physical Exam   Constitutional: He is oriented to person, place, and time  He appears well-developed and well-nourished  No distress  Pulmonary/Chest: Effort normal    Musculoskeletal:   C-spine nontender  He has full range of motion of bilateral upper extremities with some stiffness and range of shoulder flexion  He flexes his neck when he tries to flex his shoulders  Sensation is intact bilateral upper extremities but he says it is slightly decreased over the left middle finger tip  Positive Tinel's over the left median nerve  Negative Tinel's over the right median nerve  He has some atrophy over the right thenar eminence  Neurological: He is alert and oriented to person, place, and time  Skin: Skin is warm and dry

## 2020-03-02 NOTE — PATIENT INSTRUCTIONS
Patient has an EMG pending  This will be of benefit to gently a level of nerve compression  He likely has some carpal tunnel syndrome  Does have some stiffness in his trapezius and neck  We can try Robaxin for that  Moist heat  Continue anti-inflammatories over-the-counter  Follow-up with PCP for hemoglobin A1c check which she has tomorrow

## 2020-03-05 ENCOUNTER — TRANSCRIBE ORDERS (OUTPATIENT)
Dept: ADMINISTRATIVE | Facility: HOSPITAL | Age: 55
End: 2020-03-05

## 2020-03-05 ENCOUNTER — APPOINTMENT (OUTPATIENT)
Dept: LAB | Facility: CLINIC | Age: 55
End: 2020-03-05
Payer: COMMERCIAL

## 2020-03-05 DIAGNOSIS — R73.9 HYPERGLYCEMIA: ICD-10-CM

## 2020-03-05 DIAGNOSIS — R73.9 HYPERGLYCEMIA: Primary | ICD-10-CM

## 2020-03-05 DIAGNOSIS — E78.5 HYPERLIPIDEMIA, UNSPECIFIED HYPERLIPIDEMIA TYPE: ICD-10-CM

## 2020-03-05 LAB
CHOLEST SERPL-MCNC: 263 MG/DL (ref 50–200)
EST. AVERAGE GLUCOSE BLD GHB EST-MCNC: 295 MG/DL
HBA1C MFR BLD: 11.9 %
HDLC SERPL-MCNC: 43 MG/DL
NONHDLC SERPL-MCNC: 220 MG/DL
TRIGL SERPL-MCNC: 439 MG/DL

## 2020-03-05 PROCEDURE — 36415 COLL VENOUS BLD VENIPUNCTURE: CPT

## 2020-03-05 PROCEDURE — 83036 HEMOGLOBIN GLYCOSYLATED A1C: CPT

## 2020-03-05 PROCEDURE — 80061 LIPID PANEL: CPT

## 2020-04-28 ENCOUNTER — HOSPITAL ENCOUNTER (EMERGENCY)
Facility: HOSPITAL | Age: 55
Discharge: HOME | End: 2020-04-28
Attending: EMERGENCY MEDICINE | Admitting: EMERGENCY MEDICINE
Payer: COMMERCIAL

## 2020-04-28 ENCOUNTER — APPOINTMENT (EMERGENCY)
Dept: CT IMAGING | Facility: HOSPITAL | Age: 55
End: 2020-04-28
Payer: COMMERCIAL

## 2020-04-28 VITALS
SYSTOLIC BLOOD PRESSURE: 144 MMHG | DIASTOLIC BLOOD PRESSURE: 82 MMHG | HEART RATE: 82 BPM | TEMPERATURE: 98.5 F | OXYGEN SATURATION: 100 % | RESPIRATION RATE: 20 BRPM

## 2020-04-28 DIAGNOSIS — K20.90 ESOPHAGITIS: ICD-10-CM

## 2020-04-28 DIAGNOSIS — K85.90 PANCREATITIS: Primary | ICD-10-CM

## 2020-04-28 LAB
ALBUMIN SERPL BCP-MCNC: 3.4 G/DL (ref 3.5–5)
ALP SERPL-CCNC: 102 U/L (ref 46–116)
ALT SERPL W P-5'-P-CCNC: 27 U/L (ref 12–78)
ANION GAP SERPL CALCULATED.3IONS-SCNC: 10 MMOL/L (ref 4–13)
AST SERPL W P-5'-P-CCNC: 11 U/L (ref 5–45)
BACTERIA UR QL AUTO: ABNORMAL /HPF
BASOPHILS # BLD AUTO: 0.02 THOUSANDS/ΜL (ref 0–0.1)
BASOPHILS NFR BLD AUTO: 0 % (ref 0–1)
BILIRUB SERPL-MCNC: 0.6 MG/DL (ref 0.2–1)
BILIRUB UR QL STRIP: NEGATIVE
BUN SERPL-MCNC: 9 MG/DL (ref 5–25)
CALCIUM SERPL-MCNC: 8.7 MG/DL (ref 8.3–10.1)
CHLORIDE SERPL-SCNC: 103 MMOL/L (ref 100–108)
CLARITY UR: CLEAR
CO2 SERPL-SCNC: 26 MMOL/L (ref 21–32)
COLOR UR: YELLOW
CREAT SERPL-MCNC: 0.81 MG/DL (ref 0.6–1.3)
EOSINOPHIL # BLD AUTO: 0.14 THOUSAND/ΜL (ref 0–0.61)
EOSINOPHIL NFR BLD AUTO: 2 % (ref 0–6)
ERYTHROCYTE [DISTWIDTH] IN BLOOD BY AUTOMATED COUNT: 13 % (ref 11.6–15.1)
GFR SERPL CREATININE-BSD FRML MDRD: 117 ML/MIN/1.73SQ M
GLUCOSE SERPL-MCNC: 238 MG/DL (ref 65–140)
GLUCOSE UR STRIP-MCNC: ABNORMAL MG/DL
HCT VFR BLD AUTO: 48.2 % (ref 36.5–49.3)
HGB BLD-MCNC: 15.8 G/DL (ref 12–17)
HGB UR QL STRIP.AUTO: NEGATIVE
IMM GRANULOCYTES # BLD AUTO: 0.02 THOUSAND/UL (ref 0–0.2)
IMM GRANULOCYTES NFR BLD AUTO: 0 % (ref 0–2)
KETONES UR STRIP-MCNC: ABNORMAL MG/DL
LEUKOCYTE ESTERASE UR QL STRIP: ABNORMAL
LIPASE SERPL-CCNC: 380 U/L (ref 73–393)
LYMPHOCYTES # BLD AUTO: 3.06 THOUSANDS/ΜL (ref 0.6–4.47)
LYMPHOCYTES NFR BLD AUTO: 39 % (ref 14–44)
MCH RBC QN AUTO: 28.5 PG (ref 26.8–34.3)
MCHC RBC AUTO-ENTMCNC: 32.8 G/DL (ref 31.4–37.4)
MCV RBC AUTO: 87 FL (ref 82–98)
MONOCYTES # BLD AUTO: 0.49 THOUSAND/ΜL (ref 0.17–1.22)
MONOCYTES NFR BLD AUTO: 6 % (ref 4–12)
NEUTROPHILS # BLD AUTO: 4.17 THOUSANDS/ΜL (ref 1.85–7.62)
NEUTS SEG NFR BLD AUTO: 53 % (ref 43–75)
NITRITE UR QL STRIP: NEGATIVE
NON-SQ EPI CELLS URNS QL MICRO: ABNORMAL /HPF
NRBC BLD AUTO-RTO: 0 /100 WBCS
PH UR STRIP.AUTO: 6 [PH]
PLATELET # BLD AUTO: 261 THOUSANDS/UL (ref 149–390)
PMV BLD AUTO: 10.1 FL (ref 8.9–12.7)
POTASSIUM SERPL-SCNC: 3.9 MMOL/L (ref 3.5–5.3)
PROT SERPL-MCNC: 7.8 G/DL (ref 6.4–8.2)
PROT UR STRIP-MCNC: NEGATIVE MG/DL
RBC # BLD AUTO: 5.54 MILLION/UL (ref 3.88–5.62)
RBC #/AREA URNS AUTO: ABNORMAL /HPF
SODIUM SERPL-SCNC: 139 MMOL/L (ref 136–145)
SP GR UR STRIP.AUTO: 1.01 (ref 1–1.03)
TROPONIN I SERPL-MCNC: <0.02 NG/ML
UROBILINOGEN UR QL STRIP.AUTO: 0.2 E.U./DL
WBC # BLD AUTO: 7.9 THOUSAND/UL (ref 4.31–10.16)
WBC #/AREA URNS AUTO: ABNORMAL /HPF

## 2020-04-28 PROCEDURE — 84484 ASSAY OF TROPONIN QUANT: CPT | Performed by: EMERGENCY MEDICINE

## 2020-04-28 PROCEDURE — 93005 ELECTROCARDIOGRAM TRACING: CPT

## 2020-04-28 PROCEDURE — 96374 THER/PROPH/DIAG INJ IV PUSH: CPT

## 2020-04-28 PROCEDURE — 83690 ASSAY OF LIPASE: CPT | Performed by: EMERGENCY MEDICINE

## 2020-04-28 PROCEDURE — 81001 URINALYSIS AUTO W/SCOPE: CPT | Performed by: EMERGENCY MEDICINE

## 2020-04-28 PROCEDURE — 96375 TX/PRO/DX INJ NEW DRUG ADDON: CPT

## 2020-04-28 PROCEDURE — 85025 COMPLETE CBC W/AUTO DIFF WBC: CPT | Performed by: EMERGENCY MEDICINE

## 2020-04-28 PROCEDURE — 99284 EMERGENCY DEPT VISIT MOD MDM: CPT

## 2020-04-28 PROCEDURE — 36415 COLL VENOUS BLD VENIPUNCTURE: CPT | Performed by: EMERGENCY MEDICINE

## 2020-04-28 PROCEDURE — 80053 COMPREHEN METABOLIC PANEL: CPT | Performed by: EMERGENCY MEDICINE

## 2020-04-28 PROCEDURE — 99285 EMERGENCY DEPT VISIT HI MDM: CPT | Performed by: EMERGENCY MEDICINE

## 2020-04-28 PROCEDURE — 74177 CT ABD & PELVIS W/CONTRAST: CPT

## 2020-04-28 PROCEDURE — 96361 HYDRATE IV INFUSION ADD-ON: CPT

## 2020-04-28 RX ORDER — ONDANSETRON 2 MG/ML
4 INJECTION INTRAMUSCULAR; INTRAVENOUS ONCE
Status: COMPLETED | OUTPATIENT
Start: 2020-04-28 | End: 2020-04-28

## 2020-04-28 RX ORDER — ONDANSETRON 4 MG/1
4 TABLET, ORALLY DISINTEGRATING ORAL EVERY 6 HOURS PRN
Qty: 12 TABLET | Refills: 0 | Status: SHIPPED | OUTPATIENT
Start: 2020-04-28

## 2020-04-28 RX ORDER — OXYCODONE HYDROCHLORIDE AND ACETAMINOPHEN 5; 325 MG/1; MG/1
1 TABLET ORAL EVERY 4 HOURS PRN
Qty: 6 TABLET | Refills: 0 | Status: SHIPPED | OUTPATIENT
Start: 2020-04-28 | End: 2020-05-08

## 2020-04-28 RX ORDER — KETOROLAC TROMETHAMINE 30 MG/ML
15 INJECTION, SOLUTION INTRAMUSCULAR; INTRAVENOUS ONCE
Status: COMPLETED | OUTPATIENT
Start: 2020-04-28 | End: 2020-04-28

## 2020-04-28 RX ORDER — OMEPRAZOLE 20 MG/1
20 CAPSULE, DELAYED RELEASE ORAL DAILY
Qty: 20 CAPSULE | Refills: 0 | Status: SHIPPED | OUTPATIENT
Start: 2020-04-28

## 2020-04-28 RX ADMIN — IOHEXOL 100 ML: 350 INJECTION, SOLUTION INTRAVENOUS at 15:47

## 2020-04-28 RX ADMIN — ONDANSETRON 4 MG: 2 INJECTION INTRAMUSCULAR; INTRAVENOUS at 14:49

## 2020-04-28 RX ADMIN — KETOROLAC TROMETHAMINE 15 MG: 30 INJECTION, SOLUTION INTRAMUSCULAR at 14:50

## 2020-04-28 RX ADMIN — SODIUM CHLORIDE 1000 ML: 0.9 INJECTION, SOLUTION INTRAVENOUS at 14:48

## 2020-04-29 LAB
ATRIAL RATE: 79 BPM
P AXIS: 67 DEGREES
PR INTERVAL: 170 MS
QRS AXIS: 117 DEGREES
QRSD INTERVAL: 112 MS
QT INTERVAL: 384 MS
QTC INTERVAL: 440 MS
T WAVE AXIS: 47 DEGREES
VENTRICULAR RATE: 79 BPM

## 2020-04-29 PROCEDURE — 93010 ELECTROCARDIOGRAM REPORT: CPT | Performed by: INTERNAL MEDICINE

## 2020-09-15 ENCOUNTER — HOSPITAL ENCOUNTER (EMERGENCY)
Facility: HOSPITAL | Age: 55
Discharge: HOME | End: 2020-09-15
Attending: EMERGENCY MEDICINE | Admitting: EMERGENCY MEDICINE
Payer: COMMERCIAL

## 2020-09-15 ENCOUNTER — APPOINTMENT (EMERGENCY)
Dept: RADIOLOGY | Facility: HOSPITAL | Age: 55
End: 2020-09-15
Payer: COMMERCIAL

## 2020-09-15 ENCOUNTER — APPOINTMENT (EMERGENCY)
Dept: CT IMAGING | Facility: HOSPITAL | Age: 55
End: 2020-09-15
Payer: COMMERCIAL

## 2020-09-15 VITALS
OXYGEN SATURATION: 98 % | SYSTOLIC BLOOD PRESSURE: 182 MMHG | TEMPERATURE: 98.6 F | HEIGHT: 71 IN | DIASTOLIC BLOOD PRESSURE: 87 MMHG | BODY MASS INDEX: 26.94 KG/M2 | HEART RATE: 68 BPM | WEIGHT: 192.46 LBS | RESPIRATION RATE: 18 BRPM

## 2020-09-15 DIAGNOSIS — R00.2 PALPITATIONS: Primary | ICD-10-CM

## 2020-09-15 LAB
ALBUMIN SERPL BCP-MCNC: 3.4 G/DL (ref 3.5–5)
ALP SERPL-CCNC: 74 U/L (ref 46–116)
ALT SERPL W P-5'-P-CCNC: 39 U/L (ref 12–78)
ANION GAP SERPL CALCULATED.3IONS-SCNC: 11 MMOL/L (ref 4–13)
AST SERPL W P-5'-P-CCNC: 45 U/L (ref 5–45)
BASOPHILS # BLD AUTO: 0.02 THOUSANDS/ΜL (ref 0–0.1)
BASOPHILS NFR BLD AUTO: 0 % (ref 0–1)
BILIRUB SERPL-MCNC: 0.74 MG/DL (ref 0.2–1)
BUN SERPL-MCNC: 9 MG/DL (ref 5–25)
CALCIUM SERPL-MCNC: 8.7 MG/DL (ref 8.3–10.1)
CHLORIDE SERPL-SCNC: 100 MMOL/L (ref 100–108)
CO2 SERPL-SCNC: 24 MMOL/L (ref 21–32)
CREAT SERPL-MCNC: 0.65 MG/DL (ref 0.6–1.3)
D DIMER PPP FEU-MCNC: 0.61 UG/ML FEU
EOSINOPHIL # BLD AUTO: 0.04 THOUSAND/ΜL (ref 0–0.61)
EOSINOPHIL NFR BLD AUTO: 1 % (ref 0–6)
ERYTHROCYTE [DISTWIDTH] IN BLOOD BY AUTOMATED COUNT: 13.5 % (ref 11.6–15.1)
GFR SERPL CREATININE-BSD FRML MDRD: 128 ML/MIN/1.73SQ M
GLUCOSE SERPL-MCNC: 172 MG/DL (ref 65–140)
HCT VFR BLD AUTO: 45.4 % (ref 36.5–49.3)
HGB BLD-MCNC: 15.2 G/DL (ref 12–17)
IMM GRANULOCYTES # BLD AUTO: 0.02 THOUSAND/UL (ref 0–0.2)
IMM GRANULOCYTES NFR BLD AUTO: 0 % (ref 0–2)
LYMPHOCYTES # BLD AUTO: 2.28 THOUSANDS/ΜL (ref 0.6–4.47)
LYMPHOCYTES NFR BLD AUTO: 35 % (ref 14–44)
MCH RBC QN AUTO: 29.1 PG (ref 26.8–34.3)
MCHC RBC AUTO-ENTMCNC: 33.5 G/DL (ref 31.4–37.4)
MCV RBC AUTO: 87 FL (ref 82–98)
MONOCYTES # BLD AUTO: 0.44 THOUSAND/ΜL (ref 0.17–1.22)
MONOCYTES NFR BLD AUTO: 7 % (ref 4–12)
NEUTROPHILS # BLD AUTO: 3.72 THOUSANDS/ΜL (ref 1.85–7.62)
NEUTS SEG NFR BLD AUTO: 57 % (ref 43–75)
NRBC BLD AUTO-RTO: 0 /100 WBCS
PLATELET # BLD AUTO: 280 THOUSANDS/UL (ref 149–390)
PMV BLD AUTO: 9.8 FL (ref 8.9–12.7)
POTASSIUM SERPL-SCNC: 4.9 MMOL/L (ref 3.5–5.3)
PROT SERPL-MCNC: 7.6 G/DL (ref 6.4–8.2)
RBC # BLD AUTO: 5.22 MILLION/UL (ref 3.88–5.62)
SODIUM SERPL-SCNC: 135 MMOL/L (ref 136–145)
TROPONIN I SERPL-MCNC: <0.02 NG/ML
TSH SERPL DL<=0.05 MIU/L-ACNC: 0.61 UIU/ML (ref 0.36–3.74)
WBC # BLD AUTO: 6.52 THOUSAND/UL (ref 4.31–10.16)

## 2020-09-15 PROCEDURE — 99285 EMERGENCY DEPT VISIT HI MDM: CPT

## 2020-09-15 PROCEDURE — 71046 X-RAY EXAM CHEST 2 VIEWS: CPT

## 2020-09-15 PROCEDURE — 99284 EMERGENCY DEPT VISIT MOD MDM: CPT | Performed by: EMERGENCY MEDICINE

## 2020-09-15 PROCEDURE — 84443 ASSAY THYROID STIM HORMONE: CPT | Performed by: CHIROPRACTOR

## 2020-09-15 PROCEDURE — 84484 ASSAY OF TROPONIN QUANT: CPT | Performed by: CHIROPRACTOR

## 2020-09-15 PROCEDURE — 80053 COMPREHEN METABOLIC PANEL: CPT | Performed by: CHIROPRACTOR

## 2020-09-15 PROCEDURE — 85025 COMPLETE CBC W/AUTO DIFF WBC: CPT | Performed by: CHIROPRACTOR

## 2020-09-15 PROCEDURE — G1004 CDSM NDSC: HCPCS

## 2020-09-15 PROCEDURE — 71275 CT ANGIOGRAPHY CHEST: CPT

## 2020-09-15 PROCEDURE — 36415 COLL VENOUS BLD VENIPUNCTURE: CPT | Performed by: CHIROPRACTOR

## 2020-09-15 PROCEDURE — 93005 ELECTROCARDIOGRAM TRACING: CPT

## 2020-09-15 PROCEDURE — 85379 FIBRIN DEGRADATION QUANT: CPT | Performed by: CHIROPRACTOR

## 2020-09-15 RX ADMIN — IOHEXOL 85 ML: 350 INJECTION, SOLUTION INTRAVENOUS at 17:50

## 2020-09-15 NOTE — ED ATTENDING ATTESTATION
9/15/2020  ISandra DO, saw and evaluated the patient  I have discussed the patient with the resident/non-physician practitioner and agree with the resident's/non-physician practitioner's findings, Plan of Care, and MDM as documented in the resident's/non-physician practitioner's note, except where noted  All available labs and Radiology studies were reviewed  I was present for key portions of any procedure(s) performed by the resident/non-physician practitioner and I was immediately available to provide assistance  At this point I agree with the current assessment done in the Emergency Department  I have conducted an independent evaluation of this patient a history and physical is as follows:    ED Course     48year old male presents to the ED for evaluation of acute onset of palpitations and dizziness at 130 pm   Patient was walking into the courtroom for a hearing when he developed  Rapid heart rate and dizziness with SOB  No chest pain  Symptoms have resolved but lasted approx 1 hour  Patient states he had similar symptoms approximately 1 week ago  Patient states that he feels that his heart is racing  This makes him feel winded  He is a heavy smoker  Denies previous cardiac history  He admits to significant caffeine intake  He also reports a history of thyroid disease  No syncope  No recent fevers or chills  Patient denies drug use  Past medical history:  Diabetes, hyperlipidemia, pancreatitis    PE:  Vital signs stable  Patient resting in no acute distress  Mucous membranes are moist   Neck is supple without JVD  Lungs are coarse without focal wheezing  Heart is regular with occasional premature atrial contraction  No murmur  Abdomen is soft nontender and non distended  Patient has full strength in all 4 extremities  No lower extremity edema  Speech is clear and appropriate      Plan:  Differential diagnosis includes was not limited to acute dysrhythmia, PACs/PVCs, PE, anxiety, less likely cardiac ischemia  Update:  Patient's workup reassuring  CT negative for acute PE  Will discharge patient to follow-up with PCP for further evaluation palpitations  May benefit from Holter monitor    Discussed signs and symptoms return to emergency department        Critical Care Time  Procedures

## 2020-09-15 NOTE — ED PROVIDER NOTES
History  Chief Complaint   Patient presents with    Palpitations     Pt was at court, felt dizzy and "heart fluttering"  Hx of MI     This patient is a 28-year-old male who presents the emergency department this afternoon reporting a transient episode of what he describes as palpitations, accompanied by shortness of breath, along with a short period of lightheadedness  He denies any chest pain during or surrounding this episode  He reports that this began as he was entering a court house at around 1:30 p m  Glenn Barrios he presented before the , his symptoms worsened and hence emergency medical services were called  Patient reports a similar episode approximately 3 weeks ago which occurred while he was sitting was relatively mild self-limited  To the best of his knowledge he has never received a cardiac workup  Past medical history significant for diabetes type 1, and a recent bout with pancreatitis and esophagitis  Patient is also an asthmatic and cigarette smoker  The patient was oriented and in no acute distress, examined at the bedside with his  present  Prior to Admission Medications   Prescriptions Last Dose Informant Patient Reported? Taking?    Insulin Pen Needle (PEN NEEDLES 29GX1/2") 29G X 12MM MISC 9/15/2020 at Unknown time Self Yes Yes   Si Device by Does not apply route   Insulin Syringe-Needle U-100 31G X 1/4" 0 5 ML MISC 9/15/2020 at Unknown time Self Yes Yes   Si Syringe by Does not apply route   Lancet Devices (LANCING DEVICE) MISC 9/15/2020 at Unknown time Self Yes Yes   Sig: Inject 1 Device under the skin   albuterol (PROVENTIL HFA,VENTOLIN HFA) 90 mcg/act inhaler 9/15/2020 at Unknown time Self No Yes   Sig: Inhale 2 puffs every 4 (four) hours as needed for wheezing   atorvastatin (LIPITOR) 40 mg tablet 2020 at Unknown time Self Yes Yes   Sig: TAKE 1 TABLET BY MOUTH EVERY DAY AT NIGHT   budesonide (PULMICORT) 0 25 mg/2 mL nebulizer solution  Self Yes No   Sig: Inhale 0 25 mg   fluticasone-salmeterol (Advair HFA) 115-21 MCG/ACT inhaler 9/15/2020 at Unknown time  Yes Yes   Sig: Inhale 2 puffs 2 (two) times a day   fluticasone-vilanterol (BREO ELLIPTA) 200-25 MCG/INH inhaler 9/15/2020 at Unknown time Self Yes Yes   Sig: Inhale 1 puff   insulin lispro protamine-insulin lispro (HumaLOG 75/25) 100 units/mL  Self Yes No   Sig: Inject 15 Units under the skin    insulin lispro protamine-insulin lispro (HumaLOG 75/25) 100 units/mL   No No   Sig: Inject 15 Units under the skin 3 (three) times a day for 10 days   lisinopril (ZESTRIL) 20 mg tablet 9/15/2020 at Unknown time Self Yes Yes   Sig: Take 20 mg by mouth daily   lisinopril-hydrochlorothiazide (PRINZIDE,ZESTORETIC) 20-25 MG per tablet 9/15/2020 at Unknown time Self Yes Yes   Sig: Take 1 tablet by mouth   metFORMIN (GLUCOPHAGE) 1000 MG tablet 9/15/2020 at Unknown time Self Yes Yes   Sig: Take 1,000 mg by mouth 2 (two) times a day with meals     methocarbamol (ROBAXIN) 500 mg tablet 9/15/2020 at Unknown time  No Yes   Sig: Take 1 tablet (500 mg total) by mouth 4 (four) times a day   naproxen (NAPROSYN) 500 mg tablet 9/15/2020 at Unknown time Self Yes Yes   Sig: Take 1 tablet by mouth every 12 (twelve) hours   nicotine (NICODERM CQ) 21 mg/24 hr TD 24 hr patch 9/15/2020 at Unknown time Self No Yes   Sig: Place 1 patch on the skin daily   omeprazole (PriLOSEC) 20 mg delayed release capsule 9/15/2020 at Unknown time  No Yes   Sig: Take 1 capsule (20 mg total) by mouth daily   ondansetron (ZOFRAN-ODT) 4 mg disintegrating tablet Past Week at Unknown time  No Yes   Sig: Take 1 tablet (4 mg total) by mouth every 6 (six) hours as needed for nausea or vomiting      Facility-Administered Medications: None       Past Medical History:   Diagnosis Date    Asthma     Cardiac murmur     [facility]ch[facility]) ([facility] )     Diabetes mellitus ([address] )     Hyperlipidemia     Hypertension        Past Surgical History:   Procedure Laterality Date    APPENDECTOMY      teenager        Family History   Problem Relation Age of Onset    Hypertension Mother     Hypertension Father      I have reviewed and agree with the history as documented  E-Cigarette/Vaping    E-Cigarette Use Never User      E-Cigarette/Vaping Substances     Social History     Tobacco Use    Smoking status: Current Every Day Smoker     Packs/day: 1 00     Years: 25 00     Pack years: 25 00     Types: Cigarettes    Smokeless tobacco: Never Used   Substance Use Topics    Alcohol use: Yes     Comment: occassional    Drug use: No        Review of Systems   Constitutional: Positive for appetite change (decreased recentlty)  Negative for fatigue and fever  HENT: Negative for ear pain and sore throat  Eyes: Negative for pain  Respiratory: Positive for shortness of breath (transient with palpitations)  Negative for cough  Cardiovascular: Negative for chest pain  Gastrointestinal: Negative for abdominal pain, diarrhea, nausea and vomiting  Genitourinary: Negative for difficulty urinating  Musculoskeletal: Positive for joint swelling (R knee MRI ordered)  Neurological: Positive for dizziness (transient)  Negative for headaches  Physical Exam  ED Triage Vitals [09/15/20 1426]   Temperature Pulse Respirations Blood Pressure SpO2   98 6 °F (37 °C) 77 18 167/89 99 %      Temp Source Heart Rate Source Patient Position - Orthostatic VS BP Location FiO2 (%)   Oral Monitor Lying Right arm --      Pain Score       --             Orthostatic Vital Signs  Vitals:    09/15/20 1426 09/15/20 1621 09/15/20 1634 09/15/20 1839   BP: 167/89 (!) 176/93 (!) 176/93    Pulse: 77 68 76 70   Patient Position - Orthostatic VS: Lying Lying Lying Lying       Physical Exam  Vitals signs and nursing note reviewed  Constitutional:       Appearance: He is normal weight  HENT:      Head: Atraumatic        Mouth/Throat:      Mouth: Mucous membranes are moist    Eyes: Extraocular Movements: Extraocular movements intact  Pupils: Pupils are equal, round, and reactive to light  Neck:      Vascular: No carotid bruit  Cardiovascular:      Rate and Rhythm: Normal rate and regular rhythm  Heart sounds: Normal heart sounds  No murmur  Pulmonary:      Effort: Pulmonary effort is normal       Breath sounds: No wheezing  Comments: Course breath sounds all posterior dfields  Abdominal:      General: Abdomen is flat  Bowel sounds are normal  There is no distension  Palpations: Abdomen is soft  Tenderness: There is abdominal tenderness (RUQ and epigastric)  Skin:     General: Skin is warm and dry  Neurological:      General: No focal deficit present  Mental Status: He is alert and oriented to person, place, and time  Cranial Nerves: No cranial nerve deficit  Psychiatric:         Mood and Affect: Mood normal          Behavior: Behavior normal          ED Medications  Medications   iohexol (OMNIPAQUE) 350 MG/ML injection (MULTI-DOSE) 85 mL (85 mL Intravenous Given 9/15/20 1750)       Diagnostic Studies  Results Reviewed     Procedure Component Value Units Date/Time    TSH [[id number]]  (Normal) Collected:  09/15/20 1621    Lab Status:  Final result Specimen:  Blood from Arm, Left Updated:  09/15/20 1829     TSH 3RD GENERATON 0 613 uIU/mL     Narrative:       Patients undergoing fluorescein dye angiography may retain small amounts of fluorescein in the body for 48-72 hours post procedure  Samples containing fluorescein can produce falsely depressed TSH values  If the patient had this procedure,a specimen should be resubmitted post fluorescein clearance        Comprehensive metabolic panel [[id number]]  (Abnormal) Collected:  09/15/20 1621    Lab Status:  Final result Specimen:  Blood from Arm, Left Updated:  09/15/20 1701     Sodium 135 mmol/L      Potassium 4 9 mmol/L      Chloride 100 mmol/L      CO2 24 mmol/L      ANION GAP 11 mmol/L      BUN 9 mg/dL Creatinine 0 65 mg/dL      Glucose 172 mg/dL      Calcium 8 7 mg/dL      AST 45 U/L      ALT 39 U/L      Alkaline Phosphatase 74 U/L      Total Protein 7 6 g/dL      Albumin 3 4 g/dL      Total Bilirubin 0 74 mg/dL      eGFR 128 ml/min/1 73sq m     Narrative:       National Kidney Disease Foundation guidelines for Chronic Kidney Disease (CKD):     Stage 1 with normal or high GFR (GFR > 90 mL/min/1 73 square meters)    Stage 2 Mild CKD (GFR = 60-89 mL/min/1 73 square meters)    Stage 3A Moderate CKD (GFR = 45-59 mL/min/1 73 square meters)    Stage 3B Moderate CKD (GFR = 30-44 mL/min/1 73 square meters)    Stage 4 Severe CKD (GFR = 15-29 mL/min/1 73 square meters)    Stage 5 End Stage CKD (GFR <15 mL/min/1 73 square meters)  Note: GFR calculation is accurate only with a steady state creatinine    Troponin I [[id number]]  (Normal) Collected:  09/15/20 1505    Lab Status:  Final result Specimen:  Blood from Arm, Left Updated:  09/15/20 1550     Troponin I <0 02 ng/mL     D-dimer, quantitative [[id number]]  (Abnormal) Collected:  09/15/20 1505    Lab Status:  Final result Specimen:  Blood from Arm, Left Updated:  09/15/20 1527     D-Dimer, Quant 0 61 ug/ml FEU     CBC and differential [[id number]] Collected:  09/15/20 1505    Lab Status:  Final result Specimen:  Blood from Arm, Left Updated:  09/15/20 1514     WBC 6 52 Thousand/uL      RBC 5 22 Million/uL      Hemoglobin 15 2 g/dL      Hematocrit 45 4 %      MCV 87 fL      MCH 29 1 pg      MCHC 33 5 g/dL      RDW 13 5 %      MPV 9 8 fL      Platelets 836 Thousands/uL      nRBC 0 /100 WBCs      Neutrophils Relative 57 %      Immat GRANS % 0 %      Lymphocytes Relative 35 %      Monocytes Relative 7 %      Eosinophils Relative 1 %      Basophils Relative 0 %      Neutrophils Absolute 3 72 Thousands/µL      Immature Grans Absolute 0 02 Thousand/uL      Lymphocytes Absolute 2 28 Thousands/µL      Monocytes Absolute 0 44 Thousand/µL      Eosinophils Absolute 0 04 Thousand/µL      Basophils Absolute 0 02 Thousands/µL                  CTA ED chest PE Study   Final Result by Sherley Ferreira MD (09/15 1833)      No evidence of acute pulmonary embolus, thoracic aortic aneurysm or dissection  No acute cardiopulmonary process  Workstation performed: [id number]         XR chest 2 views   Final Result by Shahriar Valdez MD (09/15 1525)      No acute cardiopulmonary disease  Workstation performed: [id number]               Procedures  ECG 12 Lead Documentation Only    Date/Time: 9/15/2020 3:08 PM  Performed by: Kristopher Ho MD  Authorized by: Kristopher Ho MD     ECG reviewed by me, the ED Provider: yes    Patient location:  Bedside  Previous ECG:     Previous ECG:  Compared to current    Comparison ECG info:  04/28/20    Similarity:  Changes noted  Interpretation:     Interpretation: abnormal    Rate:     ECG rate:  73  Rhythm:     Rhythm: other rhythm    Ectopy:     Ectopy: PAC    QRS:     QRS axis:  Left  ST segments:     ST segments:  Non-specific    Elevation:  II  T waves:     T waves: non-specific    Comments:      Likely PAC noted Lead II          ED Course  ED Course as of Sep 15 1846   Tue Sep 15, 2020   1540 The patient was seen comfortable at the bedside  He currently is asymptomatic  I explained we ordered a CTA of his chest to rule out a blood clot based upon his elevated blood test (D-dimer)  Patient understands  1645 Comprehensive metabolic panel   2440 Comprehensive metabolic panel   6746 Mr Juli Duran is resting comfortably and remains asymptomatic  1845 The patient was resting comfortably at the bedside  We informed him of the interpretation of his CTA and we will be providing with written discharge instructions, including to follow-up within the next few days with his primary care physician  We also suggested a possible Holter monitor the be valuable to determine the underlying etiology of his palpitations  HEART Risk Score      Most Recent Value   Heart Score Risk Calculator   History  0 Filed at: 09/15/2020 1506   ECG     Age  1 Filed at: 09/15/2020 1506   Risk Factors  1 Filed at: 09/15/2020 1506   Troponin     HEART Score                PERC Rule for PE      Most Recent Value   PERC Rule for PE   Age >=50  1 Filed at: 09/15/2020 1459   HR >=100  0 Filed at: 09/15/2020 1459   O2 Sat on room air < 95%  0 Filed at: 09/15/2020 1459   History of PE or DVT  0 Filed at: 09/15/2020 1459   Recent trauma or surgery  0 Filed at: 09/15/2020 1459   Hemoptysis  0 Filed at: 09/15/2020 1459   Exogenous estrogen  0 Filed at: 09/15/2020 1459   Unilateral leg swelling  0 Filed at: 09/15/2020 1459   PERC Rule for PE Results  1 Filed at: 09/15/2020 1459              SBIRT 22yo+      Most Recent Value   SBIRT (25 yo +)   In order to provide better care to our patients, we are screening all of our patients for alcohol and drug use  Would it be okay to ask you these screening questions? Yes Filed at: 09/15/2020 1428   Initial Alcohol Screen: US AUDIT-C    1  How often do you have a drink containing alcohol?  0 Filed at: 09/15/2020 1428   2  How many drinks containing alcohol do you have on a typical day you are drinking? 0 Filed at: 09/15/2020 1428   3a  Male UNDER 65: How often do you have five or more drinks on one occasion? 0 Filed at: 09/15/2020 1428   3b  FEMALE Any Age, or MALE 65+: How often do you have 4 or more drinks on one occassion? 0 Filed at: 09/15/2020 1428   Audit-C Score  0 Filed at: 09/15/2020 1428   MARCUS: How many times in the past year have you    Used an illegal drug or used a prescription medication for non-medical reasons?   Never Filed at: 09/15/2020 1428          Wells' Criteria for PE      Most Recent Value   Wells' Criteria for PE   Clinical signs and symptoms of DVT  0 Filed at: 09/15/2020 1536   PE is primary diagnosis or equally likely  0 Filed at: 09/15/2020 1536   HR >100  0 Filed at: 09/15/2020 1536   Immobilization at least 3 days or Surgery in the previous 4 weeks  0 Filed at: 09/15/2020 1536   Previous, objectively diagnosed PE or DVT  0 Filed at: 09/15/2020 1536   Hemoptysis  0 Filed at: 09/15/2020 1536   Malignancy with treatment within 6 months or palliative  0 Filed at: 09/15/2020 1536   Wells' Criteria Total  0 Filed at: 09/15/2020 1536              MDM      Disposition  Final diagnoses:   Palpitations     Time reflects when diagnosis was documented in both MDM as applicable and the Disposition within this note     Time User Action Codes Description Comment    9/15/2020  6:44 PM Pérez Chino Add [R00 2] Palpitations       ED Disposition     ED Disposition Condition Date/Time Comment    Discharge Stable Tue Sep 15, 2020  6:44 PM Creed Cooler discharge to home/self care  Follow-up Information     Follow up With Specialties Details Why [address] Internal Medicine Schedule an appointment as soon as possible for a visit in 1 week If symptoms worsen return to the emergency department  [address] [facility]            Patient's Medications   Discharge Prescriptions    No medications on file     No discharge procedures on file  PDMP Review     None           ED Provider  Attending physically available and evaluated Cregee Meg  I managed the patient along with the ED Attending      Electronically Signed by         Kristopher Ho MD  09/15/20 1486

## 2020-09-16 LAB
ATRIAL RATE: 75 BPM
P AXIS: 76 DEGREES
PR INTERVAL: 158 MS
QRS AXIS: 57 DEGREES
QRSD INTERVAL: 98 MS
QT INTERVAL: 370 MS
QTC INTERVAL: 408 MS
T WAVE AXIS: 27 DEGREES
VENTRICULAR RATE: 73 BPM

## 2020-09-16 PROCEDURE — 93010 ELECTROCARDIOGRAM REPORT: CPT | Performed by: INTERNAL MEDICINE

## 2021-04-24 ENCOUNTER — OFFICE VISIT (OUTPATIENT)
Dept: URGENT CARE | Facility: CLINIC | Age: 56
End: 2021-04-24
Payer: COMMERCIAL

## 2021-04-24 VITALS
TEMPERATURE: 98.3 F | DIASTOLIC BLOOD PRESSURE: 88 MMHG | OXYGEN SATURATION: 95 % | SYSTOLIC BLOOD PRESSURE: 160 MMHG | HEART RATE: 77 BPM | RESPIRATION RATE: 18 BRPM

## 2021-04-24 DIAGNOSIS — R60.0 EDEMA OF RIGHT FOOT: Primary | ICD-10-CM

## 2021-04-24 PROCEDURE — 99213 OFFICE O/P EST LOW 20 MIN: CPT | Performed by: PHYSICIAN ASSISTANT

## 2021-04-24 RX ORDER — FENOFIBRATE 160 MG/1
160 TABLET ORAL DAILY
COMMUNITY
Start: 2021-03-22

## 2021-04-24 RX ORDER — GABAPENTIN 300 MG/1
300 CAPSULE ORAL 3 TIMES DAILY
COMMUNITY

## 2021-04-24 RX ORDER — MELOXICAM 15 MG/1
15 TABLET ORAL DAILY
COMMUNITY
Start: 2021-04-14

## 2021-04-24 NOTE — PROGRESS NOTES
[facility]        NAME: Bong Anaya is a 48 y o  male  : 1965    MRN: [id number]  DATE: 2021  TIME: 2:24 PM    Assessment and Plan   Edema of right foot [R60 0]  1  Edema of right foot  Transfer to other facility         Patient Instructions   There are no Patient Instructions on file for this visit  Follow up with PCP in 3-5 days  Proceed to  ER if symptoms worsen  Chief Complaint     Chief Complaint   Patient presents with    Edema     Pt c/o right foot edema that started 3 days ago  Pt denies any injury  History of Present Illness       The pt is a 73-year-old male presenting with edema of the right foot x 3 days  No injury  He is wearing a brace on the right knee  Hx of diabetes  Does not smoke  No calf pain  No SOB or chest pain  Review of Systems   Review of Systems   Constitutional: Negative for activity change, appetite change, chills, diaphoresis and fever  HENT: Negative for congestion, rhinorrhea and sore throat  Respiratory: Negative for cough, chest tightness and shortness of breath  Cardiovascular: Negative for chest pain and palpitations  Gastrointestinal: Negative for abdominal pain, diarrhea, nausea and vomiting  Musculoskeletal: Positive for joint swelling (right foot)  Negative for arthralgias and myalgias  Skin: Negative for color change and pallor  Neurological: Negative for headaches           Current Medications       Current Outpatient Medications:     albuterol (PROVENTIL HFA,VENTOLIN HFA) 90 mcg/act inhaler, Inhale 2 puffs every 4 (four) hours as needed for wheezing, Disp: 1 Inhaler, Rfl: 0    atorvastatin (LIPITOR) 40 mg tablet, TAKE 1 TABLET BY MOUTH EVERY DAY AT NIGHT, Disp: , Rfl: 3    budesonide (PULMICORT) 0 25 mg/2 mL nebulizer solution, Inhale 0 25 mg, Disp: , Rfl:     fluticasone-salmeterol (Advair HFA) 115-21 MCG/ACT inhaler, Inhale 2 puffs 2 (two) times a day, Disp: , Rfl:     insulin lispro protamine-insulin lispro (HumaLOG 75/25) 100 units/mL, Inject 15 Units under the skin , Disp: , Rfl:     Insulin Pen Needle (PEN NEEDLES 29GX1/2") 29G X 12MM MISC, 1 Device by Does not apply route, Disp: , Rfl:     Insulin Syringe-Needle U-100 31G X 1/4" 0 5 ML MISC, 1 Syringe by Does not apply route, Disp: , Rfl:     Lancet Devices (LANCING DEVICE) MISC, Inject 1 Device under the skin, Disp: , Rfl:     lisinopril (ZESTRIL) 20 mg tablet, Take 20 mg by mouth daily, Disp: , Rfl:     metFORMIN (GLUCOPHAGE) 1000 MG tablet, Take 1,000 mg by mouth 2 (two) times a day with meals  , Disp: , Rfl:     nicotine (NICODERM CQ) 21 mg/24 hr TD 24 hr patch, Place 1 patch on the skin daily, Disp: 28 patch, Rfl: 0    fenofibrate (TRIGLIDE) 160 MG tablet, Take 160 mg by mouth daily, Disp: , Rfl:     fluticasone-vilanterol (BREO ELLIPTA) 200-25 MCG/INH inhaler, Inhale 1 puff, Disp: , Rfl:     gabapentin (NEURONTIN) 300 mg capsule, Take 300 mg by mouth Three times a day, Disp: , Rfl:     lisinopril-hydrochlorothiazide (PRINZIDE,ZESTORETIC) 20-25 MG per tablet, Take 1 tablet by mouth, Disp: , Rfl:     meloxicam (MOBIC) 15 mg tablet, Take 15 mg by mouth daily, Disp: , Rfl:     methocarbamol (ROBAXIN) 500 mg tablet, Take 1 tablet (500 mg total) by mouth 4 (four) times a day (Patient not taking: Reported on 4/24/2021), Disp: 40 tablet, Rfl: 0    naproxen (NAPROSYN) 500 mg tablet, Take 1 tablet by mouth every 12 (twelve) hours, Disp: , Rfl:     omeprazole (PriLOSEC) 20 mg delayed release capsule, Take 1 capsule (20 mg total) by mouth daily (Patient not taking: Reported on 4/24/2021), Disp: 20 capsule, Rfl: 0    ondansetron (ZOFRAN-ODT) 4 mg disintegrating tablet, Take 1 tablet (4 mg total) by mouth every 6 (six) hours as needed for nausea or vomiting (Patient not taking: Reported on 4/24/2021), Disp: 12 tablet, Rfl: 0    Current Allergies     Allergies as of 04/24/2021    (No Known Allergies)            The following portions of the patient's history were reviewed and updated as appropriate: allergies, current medications, past family history, past medical history, past social history, past surgical history and problem list      Past Medical History:   Diagnosis Date    Asthma     Cardiac murmur     COPD [facility]chronic obstructive pulmonary disease) ([facility] )     Diabetes mellitus ([facility] )     Hyperlipidemia     Hypertension        Past Surgical History:   Procedure Laterality Date    APPENDECTOMY      teenager        Family History   Problem Relation Age of Onset    Hypertension Mother     Hypertension Father          Medications have been verified  Objective   /88   Pulse 77   Temp 98 3 °F (36 8 °C) (Temporal)   Resp 18   SpO2 95%        Physical Exam     Physical Exam  Vitals signs reviewed  Constitutional:       General: He is not in acute distress  Appearance: Normal appearance  He is not ill-appearing, toxic-appearing or diaphoretic  HENT:      Head: Normocephalic and atraumatic  Neck:      Musculoskeletal: Normal range of motion  Cardiovascular:      Rate and Rhythm: Normal rate and regular rhythm  Heart sounds: Normal heart sounds  No murmur  No friction rub  No gallop  Pulmonary:      Effort: Pulmonary effort is normal  No respiratory distress  Breath sounds: Normal breath sounds  No stridor  No wheezing, rhonchi or rales  Chest:      Chest wall: No tenderness  Abdominal:      General: Abdomen is flat  Bowel sounds are normal  There is no distension  Palpations: Abdomen is soft  There is no mass  Tenderness: There is no abdominal tenderness  There is no guarding or rebound  Hernia: No hernia is present  Musculoskeletal:        Feet:    Lymphadenopathy:      Cervical: No cervical adenopathy  Skin:     General: Skin is warm and dry  Capillary Refill: Capillary refill takes less than 2 seconds  Neurological:      Mental Status: He is alert

## 2021-05-10 ENCOUNTER — EVALUATION (OUTPATIENT)
Dept: PHYSICAL THERAPY | Facility: CLINIC | Age: 56
End: 2021-05-10
Payer: COMMERCIAL

## 2021-05-10 DIAGNOSIS — G89.29 CHRONIC PAIN OF RIGHT KNEE: Primary | ICD-10-CM

## 2021-05-10 DIAGNOSIS — S83.91XA SPRAIN OF RIGHT KNEE, UNSPECIFIED LIGAMENT, INITIAL ENCOUNTER: ICD-10-CM

## 2021-05-10 DIAGNOSIS — M25.561 CHRONIC PAIN OF RIGHT KNEE: Primary | ICD-10-CM

## 2021-05-10 PROCEDURE — 97140 MANUAL THERAPY 1/> REGIONS: CPT | Performed by: PHYSICAL THERAPIST

## 2021-05-10 PROCEDURE — 97161 PT EVAL LOW COMPLEX 20 MIN: CPT | Performed by: PHYSICAL THERAPIST

## 2021-05-10 PROCEDURE — 97110 THERAPEUTIC EXERCISES: CPT | Performed by: PHYSICAL THERAPIST

## 2021-05-10 NOTE — PROGRESS NOTES
PT Evaluation     Today's date: 5/10/2021  Patient name: Amy Giles  : 1965  MRN: [id number]  Referring provider: Amanda Denson MD  Dx:   Encounter Diagnosis     ICD-10-CM    1  Chronic pain of right knee  M25 561     G89 29    2  Sprain of right knee, unspecified ligament, initial encounter  S83  91XA                   Assessment  Assessment details: Amy Giles is a pleasant 48 y o  male who presents with right knee pain  The patient's greatest concerns are worry over not knowing what's wrong, concern at no signs of improvement, wanting to avoid surgery and fear of not being able to keep active  The primary movement problem is impaired knee ROM, resulting in pathoanatomical symptoms of knee pain and limiting his ability to care for self, carry, exercise or recreation, get out of a chair, perform household chores, perform yard work, sit, squat to  objects from the floor, stand and walk  No further referral appears necessary at this time based upon examination results  Primary Impairments:  1) Impaired knee ROM  2) Impaired quad recruitment   3) Impaired gait   4) Impaired balance    Etiologic factors include none recalled by the patient  Impairments: abnormal gait, abnormal or restricted ROM, activity intolerance, impaired balance, impaired physical strength, lacks appropriate home exercise program, pain with function and weight-bearing intolerance    Symptom irritability: moderateUnderstanding of Dx/Px/POC: good   Prognosis: good  Prognosis details: Positive prognostic indicators include positive attitude toward recovery, good understanding of diagnosis and treatment plan options and absence of observed red flags  Negative prognostic indicators include chronicity of symptoms, hypertension and high symptom irritability  Goals  ST  Independent with HEP in 2 weeks  2   Pt will have verbal report of improvement in symptoms by >/=25% in 2 weeks     To be obtained by D/C  LT  Pt will improve FOTO score by >/=8 points in 6 weeks  2  Pt will be able to ascend /descend stairs reciprocally  3  Pt will be able to ambulate household distances with no difficulty   4  Pt will be able carry groceries with no difficulty   5  Pt will be able to bend over to  objects with no difficulty   6  Pt will be able to return to recreational activities      Plan  Plan details: Prognosis above is given PT services 2x/week tapering to 1x/week over the next 2 months and home program adherence  Patient would benefit from: skilled physical therapy  Planned modality interventions: thermotherapy: hydrocollator packs  Planned therapy interventions: activity modification, joint mobilization, manual therapy, motor coordination training, neuromuscular re-education, patient education, self care, therapeutic activities, therapeutic exercise, graded activity, home exercise program, behavior modification, functional ROM exercises, balance, strengthening and stretching  Frequency: 2x week  Duration in weeks: 8  Plan of Care beginning date: 5/10/2021  Plan of Care expiration date: 2021  Treatment plan discussed with: patient        Subjective Evaluation    History of Present Illness  Mechanism of injury: Pt reports that about 6 months ago he went to go step up and he fell backwards  After that he had pain, and cannot relate any injury to the weakness prior to that  He notes that he will be walking and his knee will buckle  He notes that he has difficulty with stairs and it makes him fearful because he feels like he is going to fall  He was prescribed a knee brace about 6 weeks ago, which he isn't sure if it is helping him  He has undergone an MRI about 3 months ago which his physician told him he might have a torn meniscus  He does note that his leg does lock    Pain  At best pain ratin  At worst pain ratin  Progression: worsening    Social Support  Steps to enter house: yes  5  Stairs in house: yes   1    Patient Goals  Patient goals for therapy: decreased pain and improved balance  Patient goal: be able to go up and down stairs, getting out of a chair, be able to lift objects, be able to take care of his children, be able to bend over to get objects without his knee buckling, be able to carry grocceries, be able to play softball, be able to run,         Objective     Tenderness     Right Knee   Tenderness in the medial joint line  No tenderness in the medial patella  Active Range of Motion   Left Knee   Flexion: 130 degrees   Extension: 4 degrees   Extensor la degrees     Right Knee   Flexion: 121 degrees with pain  Extension: -2 degrees with pain  Extensor lag: -2 degrees with pain    Passive Range of Motion   Left Knee   Flexion: 131 degrees   Extension: 4 degrees     Right Knee   Flexion: 125 degrees with pain  Extension: 0 degrees with pain    Mobility   Patellar Mobility:   Left Knee   WFL: medial, lateral, superior and inferior  Right Knee   WFL: medial, lateral, superior and inferior    Strength/Myotome Testing     Left Knee   Quadriceps contraction: good    Right Knee   Quadriceps contraction: poor    Tests     Right Knee   Positive medial Nino  Ambulation   Weight-Bearing Status   Weight-Bearing Status (Left): full weight bearing   Weight-Bearing Status (Right): weight-bearing as tolerated      Observational Gait   Gait: circumduction   Decreased walking speed and stride length       Additional Observational Gait Details  Ambulated with straight leg on the R side, minimal to no knee flexion occurred during gait     Wore a knee brace              Precautions: HTN, diabetes, COPD, FEAR of falling     ** Increased time spent on PT education and HEP instruction **       Manuals 5/10            Distraction at tibiofemoral joint  KB            Tibial ER/IR mobs KB                                      Neuro Re-Ed             Quad sets 10 s x10            Prone TKE holds                                                                              Ther Ex             Bike              Heel slides  x15            SLR flex                                                                                           Ther Activity                                       Gait Training                                       Modalities

## 2021-05-10 NOTE — LETTER
May 11, 2021    Yuriy Delaney MD  [address] [place] [address]    Patient: Laurie Abbasi   YOB: 1965   Date of Visit: 5/10/2021     Encounter Diagnosis     ICD-10-CM    1  Chronic pain of right knee  M25 561     G89 29    2  Sprain of right knee, unspecified ligament, initial encounter  S83  91XA        Dear Dr Selin Nath:    Thank you for your recent referral of Laurie Abbasi  Please review the attached evaluation summary from [address] [place] [address] recent visit  Please verify that you agree with the plan of care by signing the attached order  If you have any questions or concerns, please do not hesitate to call  I sincerely appreciate the opportunity to share in the care of one of your patients and hope to have another opportunity to work with you in the near future  Sincerely,    Rah Concepcion, PT      Referring Provider:      I certify that I have read the below Plan of Care and certify the need for these services furnished under this plan of treatment while under my care  Yuriy Delaney MD  [address] [place] [address]  Via Fax: [phone]          PT Evaluation     Today's date: 5/10/2021  Patient name: Laurie Abbasi  : 1965  MRN: [id number]  Referring provider: Zoila Maloney MD  Dx:   Encounter Diagnosis     ICD-10-CM    1  Chronic pain of right knee  M25 561     G89 29    2  Sprain of right knee, unspecified ligament, initial encounter  S83  91XA                   Assessment  Assessment details: Laurie Abbasi is a pleasant 48 y o  male who presents with right knee pain  The patient's greatest concerns are worry over not knowing what's wrong, concern at no signs of improvement, wanting to avoid surgery and fear of not being able to keep active        The primary movement problem is impaired knee ROM, resulting in pathoanatomical symptoms of knee pain and limiting his ability to care for self, carry, exercise or recreation, get out of a chair, perform household chores, perform yard work, sit, squat to  objects from the floor, stand and walk  No further referral appears necessary at this time based upon examination results  Primary Impairments:  1) Impaired knee ROM  2) Impaired quad recruitment   3) Impaired gait   4) Impaired balance    Etiologic factors include none recalled by the patient  Impairments: abnormal gait, abnormal or restricted ROM, activity intolerance, impaired balance, impaired physical strength, lacks appropriate home exercise program, pain with function and weight-bearing intolerance    Symptom irritability: moderateUnderstanding of Dx/Px/POC: good   Prognosis: good  Prognosis details: Positive prognostic indicators include positive attitude toward recovery, good understanding of diagnosis and treatment plan options and absence of observed red flags  Negative prognostic indicators include chronicity of symptoms, hypertension and high symptom irritability  Goals  ST  Independent with HEP in 2 weeks  2  Pt will have verbal report of improvement in symptoms by >/=25% in 2 weeks     To be obtained by D/C  LT  Pt will improve FOTO score by >/=8 points in 6 weeks  2  Pt will be able to ascend /descend stairs reciprocally  3  Pt will be able to ambulate household distances with no difficulty   4  Pt will be able carry groceries with no difficulty   5  Pt will be able to bend over to  objects with no difficulty   6  Pt will be able to return to recreational activities      Plan  Plan details: Prognosis above is given PT services 2x/week tapering to 1x/week over the next 2 months and home program adherence    Patient would benefit from: skilled physical therapy  Planned modality interventions: thermotherapy: hydrocollator packs  Planned therapy interventions: activity modification, joint mobilization, manual therapy, motor coordination training, neuromuscular re-education, patient education, self care, therapeutic activities, therapeutic exercise, graded activity, home exercise program, behavior modification, functional ROM exercises, balance, strengthening and stretching  Frequency: 2x week  Duration in weeks: 8  Plan of Care beginning date: 5/10/2021  Plan of Care expiration date: 2021  Treatment plan discussed with: patient        Subjective Evaluation    History of Present Illness  Mechanism of injury: Pt reports that about 6 months ago he went to go step up and he fell backwards  After that he had pain, and cannot relate any injury to the weakness prior to that  He notes that he will be walking and his knee will buckle  He notes that he has difficulty with stairs and it makes him fearful because he feels like he is going to fall  He was prescribed a knee brace about 6 weeks ago, which he isn't sure if it is helping him  He has undergone an MRI about 3 months ago which his physician told him he might have a torn meniscus  He does note that his leg does lock  Pain  At best pain ratin  At worst pain ratin  Progression: worsening    Social Support  Steps to enter house: yes  5  Stairs in house: yes   1    Patient Goals  Patient goals for therapy: decreased pain and improved balance  Patient goal: be able to go up and down stairs, getting out of a chair, be able to lift objects, be able to take care of his children, be able to bend over to get objects without his knee buckling, be able to carry grocceries, be able to play softball, be able to run,         Objective     Tenderness     Right Knee   Tenderness in the medial joint line  No tenderness in the medial patella       Active Range of Motion   Left Knee   Flexion: 130 degrees   Extension: 4 degrees   Extensor la degrees     Right Knee   Flexion: 121 degrees with pain  Extension: -2 degrees with pain  Extensor lag: -2 degrees with pain    Passive Range of Motion   Left Knee   Flexion: 131 degrees   Extension: 4 degrees Right Knee   Flexion: 125 degrees with pain  Extension: 0 degrees with pain    Mobility   Patellar Mobility:   Left Knee   WFL: medial, lateral, superior and inferior  Right Knee   WFL: medial, lateral, superior and inferior    Strength/Myotome Testing     Left Knee   Quadriceps contraction: good    Right Knee   Quadriceps contraction: poor    Tests     Right Knee   Positive medial Nino  Ambulation   Weight-Bearing Status   Weight-Bearing Status (Left): full weight bearing   Weight-Bearing Status (Right): weight-bearing as tolerated      Observational Gait   Gait: circumduction   Decreased walking speed and stride length       Additional Observational Gait Details  Ambulated with straight leg on the R side, minimal to no knee flexion occurred during gait     Wore a knee brace              Precautions: HTN, diabetes, COPD, FEAR of falling     ** Increased time spent on PT education and HEP instruction **       Manuals 5/10            Distraction at tibiofemoral joint  KB            Tibial ER/IR mobs KB                                      Neuro Re-Ed             Quad sets 10 s x10            Prone TKE holds                                                                              Ther Ex             Bike              Heel slides  x15            SLR flex                                                                                           Ther Activity                                       Gait Training                                       Modalities

## 2021-05-11 ENCOUNTER — TRANSCRIBE ORDERS (OUTPATIENT)
Dept: PHYSICAL THERAPY | Facility: CLINIC | Age: 56
End: 2021-05-11

## 2021-05-11 DIAGNOSIS — G89.29 CHRONIC PAIN OF RIGHT KNEE: Primary | ICD-10-CM

## 2021-05-11 DIAGNOSIS — M25.561 CHRONIC PAIN OF RIGHT KNEE: Primary | ICD-10-CM

## 2021-05-17 ENCOUNTER — OFFICE VISIT (OUTPATIENT)
Dept: PHYSICAL THERAPY | Facility: CLINIC | Age: 56
End: 2021-05-17
Payer: COMMERCIAL

## 2021-05-17 DIAGNOSIS — G89.29 CHRONIC PAIN OF RIGHT KNEE: Primary | ICD-10-CM

## 2021-05-17 DIAGNOSIS — M25.561 CHRONIC PAIN OF RIGHT KNEE: Primary | ICD-10-CM

## 2021-05-17 DIAGNOSIS — S83.91XA SPRAIN OF RIGHT KNEE, UNSPECIFIED LIGAMENT, INITIAL ENCOUNTER: ICD-10-CM

## 2021-05-17 PROCEDURE — 97110 THERAPEUTIC EXERCISES: CPT

## 2021-05-17 PROCEDURE — 97112 NEUROMUSCULAR REEDUCATION: CPT

## 2021-05-17 NOTE — PROGRESS NOTES
Daily Note     Today's date: 2021  Patient name: Ro Nguyen  : 1965  MRN: [id number]  Referring provider: Arleene Pallas, MD  Dx:   Encounter Diagnosis     ICD-10-CM    1  Chronic pain of right knee  M25 561     G89 29    2  Sprain of right knee, unspecified ligament, initial encounter  S83  91XA                   Subjective: Pt reports no pain in R knee today but did fall yesterday, presents with antalgic gait, no edema or bruising present  He received cortizone injection today and MD wanted pt to complete physical therapy  Objective: See treatment diary below      Assessment: Initiated pt POC this visit with good tolerance, pt educated to avoid heat/massage for 48 hours with recent injection  Pt knee flexion has improved since evaluation  Visual cues to ensure correct exercise technique, no increases in pain t/o visit  Pt was able to ambulate w/o antalgic gait leaving therapy  Plan: Continue with current POC to address pt deficits        Precautions: HTN, diabetes, COPD, FEAR of falling     ** Increased time spent on PT education and HEP instruction **       Manuals 5/10 5/17           Distraction at tibiofemoral joint  KB            Tibial ER/IR mobs KB                                      Neuro Re-Ed             Quad sets 10 s x10 10"x15           Prone TKE holds             SAQ  5" 2x10           Clamshells  5" 2x10           Reverse clamshells  2x10                                     Ther Ex             Pt ed   3' avoid use of HP, massage           Bike   NV           Heel slides  x15 10"x10           SLR flex/abd  2x10 ea            PROM  8'           Bridge   5" 2x10           pball knee flexion  10"x10           Calf stretch   30"x4           Ther Activity                                       Gait Training                                       Modalities

## 2021-05-24 ENCOUNTER — APPOINTMENT (OUTPATIENT)
Dept: PHYSICAL THERAPY | Facility: CLINIC | Age: 56
End: 2021-05-24
Payer: COMMERCIAL

## 2021-06-07 ENCOUNTER — TELEPHONE (OUTPATIENT)
Dept: PHYSICAL THERAPY | Facility: CLINIC | Age: 56
End: 2021-06-07

## 2021-06-19 ENCOUNTER — HOSPITAL ENCOUNTER (EMERGENCY)
Facility: HOSPITAL | Age: 56
Discharge: HOME | End: 2021-06-19
Attending: EMERGENCY MEDICINE
Payer: COMMERCIAL

## 2021-06-19 VITALS
HEART RATE: 76 BPM | OXYGEN SATURATION: 97 % | SYSTOLIC BLOOD PRESSURE: 160 MMHG | BODY MASS INDEX: 26.94 KG/M2 | TEMPERATURE: 98.1 F | HEIGHT: 71 IN | WEIGHT: 192.46 LBS | RESPIRATION RATE: 18 BRPM | DIASTOLIC BLOOD PRESSURE: 84 MMHG

## 2021-06-19 DIAGNOSIS — K04.7 DENTAL INFECTION: Primary | ICD-10-CM

## 2021-06-19 DIAGNOSIS — R73.9 HYPERGLYCEMIA: ICD-10-CM

## 2021-06-19 LAB
ALBUMIN SERPL BCP-MCNC: 3.3 G/DL (ref 3.5–5)
ALP SERPL-CCNC: 106 U/L (ref 46–116)
ALT SERPL W P-5'-P-CCNC: 35 U/L (ref 12–78)
ANION GAP SERPL CALCULATED.3IONS-SCNC: 12 MMOL/L (ref 4–13)
AST SERPL W P-5'-P-CCNC: 15 U/L (ref 5–45)
BASE EX.OXY STD BLDV CALC-SCNC: 91.9 % (ref 60–80)
BASE EXCESS BLDV CALC-SCNC: -1.6 MMOL/L
BASOPHILS # BLD AUTO: 0.03 THOUSANDS/ΜL (ref 0–0.1)
BASOPHILS NFR BLD AUTO: 0 % (ref 0–1)
BETA-HYDROXYBUTYRATE: 0.1 MMOL/L
BILIRUB SERPL-MCNC: 0.36 MG/DL (ref 0.2–1)
BUN SERPL-MCNC: 15 MG/DL (ref 5–25)
CALCIUM ALBUM COR SERPL-MCNC: 9.1 MG/DL (ref 8.3–10.1)
CALCIUM SERPL-MCNC: 8.5 MG/DL (ref 8.3–10.1)
CHLORIDE SERPL-SCNC: 101 MMOL/L (ref 100–108)
CO2 SERPL-SCNC: 24 MMOL/L (ref 21–32)
CREAT SERPL-MCNC: 0.96 MG/DL (ref 0.6–1.3)
EOSINOPHIL # BLD AUTO: 0.11 THOUSAND/ΜL (ref 0–0.61)
EOSINOPHIL NFR BLD AUTO: 1 % (ref 0–6)
ERYTHROCYTE [DISTWIDTH] IN BLOOD BY AUTOMATED COUNT: 13.9 % (ref 11.6–15.1)
GFR SERPL CREATININE-BSD FRML MDRD: 103 ML/MIN/1.73SQ M
GLUCOSE SERPL-MCNC: 258 MG/DL (ref 65–140)
GLUCOSE SERPL-MCNC: 301 MG/DL (ref 65–140)
GLUCOSE SERPL-MCNC: 326 MG/DL (ref 65–140)
HCO3 BLDV-SCNC: 22.7 MMOL/L (ref 24–30)
HCT VFR BLD AUTO: 43.3 % (ref 36.5–49.3)
HGB BLD-MCNC: 14.5 G/DL (ref 12–17)
IMM GRANULOCYTES # BLD AUTO: 0.02 THOUSAND/UL (ref 0–0.2)
IMM GRANULOCYTES NFR BLD AUTO: 0 % (ref 0–2)
LYMPHOCYTES # BLD AUTO: 3.08 THOUSANDS/ΜL (ref 0.6–4.47)
LYMPHOCYTES NFR BLD AUTO: 32 % (ref 14–44)
MCH RBC QN AUTO: 29.3 PG (ref 26.8–34.3)
MCHC RBC AUTO-ENTMCNC: 33.5 G/DL (ref 31.4–37.4)
MCV RBC AUTO: 88 FL (ref 82–98)
MONOCYTES # BLD AUTO: 0.64 THOUSAND/ΜL (ref 0.17–1.22)
MONOCYTES NFR BLD AUTO: 7 % (ref 4–12)
NEUTROPHILS # BLD AUTO: 5.7 THOUSANDS/ΜL (ref 1.85–7.62)
NEUTS SEG NFR BLD AUTO: 60 % (ref 43–75)
NRBC BLD AUTO-RTO: 0 /100 WBCS
O2 CT BLDV-SCNC: 20 ML/DL
PCO2 BLDV: 37 MM HG (ref 42–50)
PH BLDV: 7.41 [PH] (ref 7.3–7.4)
PLATELET # BLD AUTO: 246 THOUSANDS/UL (ref 149–390)
PMV BLD AUTO: 9.7 FL (ref 8.9–12.7)
PO2 BLDV: 90.8 MM HG (ref 35–45)
POTASSIUM SERPL-SCNC: 3.8 MMOL/L (ref 3.5–5.3)
PROT SERPL-MCNC: 7.3 G/DL (ref 6.4–8.2)
RBC # BLD AUTO: 4.95 MILLION/UL (ref 3.88–5.62)
SODIUM SERPL-SCNC: 137 MMOL/L (ref 136–145)
WBC # BLD AUTO: 9.58 THOUSAND/UL (ref 4.31–10.16)

## 2021-06-19 PROCEDURE — 36415 COLL VENOUS BLD VENIPUNCTURE: CPT | Performed by: EMERGENCY MEDICINE

## 2021-06-19 PROCEDURE — 99284 EMERGENCY DEPT VISIT MOD MDM: CPT | Performed by: EMERGENCY MEDICINE

## 2021-06-19 PROCEDURE — 80053 COMPREHEN METABOLIC PANEL: CPT | Performed by: EMERGENCY MEDICINE

## 2021-06-19 PROCEDURE — 82948 REAGENT STRIP/BLOOD GLUCOSE: CPT

## 2021-06-19 PROCEDURE — 82805 BLOOD GASES W/O2 SATURATION: CPT | Performed by: EMERGENCY MEDICINE

## 2021-06-19 PROCEDURE — 82010 KETONE BODYS QUAN: CPT | Performed by: EMERGENCY MEDICINE

## 2021-06-19 PROCEDURE — 85025 COMPLETE CBC W/AUTO DIFF WBC: CPT | Performed by: EMERGENCY MEDICINE

## 2021-06-19 PROCEDURE — 99284 EMERGENCY DEPT VISIT MOD MDM: CPT

## 2021-06-19 PROCEDURE — 96360 HYDRATION IV INFUSION INIT: CPT

## 2021-06-19 RX ORDER — OXYCODONE HYDROCHLORIDE AND ACETAMINOPHEN 5; 325 MG/1; MG/1
1 TABLET ORAL ONCE
Status: COMPLETED | OUTPATIENT
Start: 2021-06-19 | End: 2021-06-19

## 2021-06-19 RX ORDER — NAPROXEN 500 MG/1
500 TABLET ORAL 2 TIMES DAILY WITH MEALS
Qty: 20 TABLET | Refills: 0 | Status: SHIPPED | OUTPATIENT
Start: 2021-06-19

## 2021-06-19 RX ORDER — OXYCODONE HYDROCHLORIDE AND ACETAMINOPHEN 5; 325 MG/1; MG/1
1 TABLET ORAL EVERY 4 HOURS PRN
Qty: 15 TABLET | Refills: 0 | Status: SHIPPED | OUTPATIENT
Start: 2021-06-19 | End: 2021-06-19 | Stop reason: SDUPTHER

## 2021-06-19 RX ORDER — OXYCODONE HYDROCHLORIDE AND ACETAMINOPHEN 5; 325 MG/1; MG/1
1 TABLET ORAL EVERY 4 HOURS PRN
Qty: 15 TABLET | Refills: 0 | Status: SHIPPED | OUTPATIENT
Start: 2021-06-19 | End: 2021-06-24

## 2021-06-19 RX ORDER — CHLORHEXIDINE GLUCONATE 0.12 MG/ML
15 RINSE ORAL 2 TIMES DAILY
Qty: 473 ML | Refills: 0 | Status: SHIPPED | OUTPATIENT
Start: 2021-06-19

## 2021-06-19 RX ORDER — CHLORHEXIDINE GLUCONATE 0.12 MG/ML
15 RINSE ORAL 2 TIMES DAILY
Qty: 473 ML | Refills: 0 | Status: SHIPPED | OUTPATIENT
Start: 2021-06-19 | End: 2021-06-19 | Stop reason: SDUPTHER

## 2021-06-19 RX ORDER — PENICILLIN V POTASSIUM 500 MG/1
500 TABLET ORAL EVERY 6 HOURS SCHEDULED
Qty: 40 TABLET | Refills: 0 | Status: SHIPPED | OUTPATIENT
Start: 2021-06-19 | End: 2021-06-29

## 2021-06-19 RX ORDER — PENICILLIN V POTASSIUM 500 MG/1
500 TABLET ORAL EVERY 6 HOURS SCHEDULED
Qty: 40 TABLET | Refills: 0 | Status: SHIPPED | OUTPATIENT
Start: 2021-06-19 | End: 2021-06-19 | Stop reason: SDUPTHER

## 2021-06-19 RX ORDER — PENICILLIN V POTASSIUM 250 MG/1
500 TABLET ORAL ONCE
Status: COMPLETED | OUTPATIENT
Start: 2021-06-19 | End: 2021-06-19

## 2021-06-19 RX ORDER — NAPROXEN 500 MG/1
500 TABLET ORAL 2 TIMES DAILY WITH MEALS
Qty: 20 TABLET | Refills: 0 | Status: SHIPPED | OUTPATIENT
Start: 2021-06-19 | End: 2021-06-19 | Stop reason: SDUPTHER

## 2021-06-19 RX ADMIN — OXYCODONE HYDROCHLORIDE AND ACETAMINOPHEN 1 TABLET: 5; 325 TABLET ORAL at 03:00

## 2021-06-19 RX ADMIN — PENICILLIN V POTASSIUM 500 MG: 250 TABLET, FILM COATED ORAL at 02:40

## 2021-06-19 RX ADMIN — SODIUM CHLORIDE 1000 ML: 0.9 INJECTION, SOLUTION INTRAVENOUS at 02:42

## 2021-06-19 NOTE — DISCHARGE INSTRUCTIONS
Please call your oral surgeon and advised that you need a sooner appointment as you required emergency department care for your dental abscess  Please take the entire course of antibiotics, return to the emergency department if you continue to have difficulty controlling blood sugar, severe pain, or any other concerns

## 2021-06-19 NOTE — ED PROVIDER NOTES
History  Chief Complaint   Patient presents with    Medical Problem     High blood sugar  1     48 y o  M presents w difficulty controlling his blood sugar for the past few days  He presents because his blood sugar was 476 OP  He has no signs of DKA and feels well other than feeling dehydrated and tired  No change in OP meds  In addition he believes he has been fighting off dental infection and does not have dentist to follow up with  No fevers or chills but has been having dental pain  Denies bad taste in her mouth or signs of pus drainage  Prior to Admission Medications   Prescriptions Last Dose Informant Patient Reported? Taking?    Insulin Pen Needle (PEN NEEDLES 29GX1/2") 29G X 12MM MISC  Self Yes No   Si Device by Does not apply route   Insulin Syringe-Needle U-100 31G X 1/4" 0 5 ML MISC  Self Yes No   Si Syringe by Does not apply route   Lancet Devices (LANCING DEVICE) MISC  Self Yes No   Sig: Inject 1 Device under the skin   albuterol (PROVENTIL HFA,VENTOLIN HFA) 90 mcg/act inhaler  Self No No   Sig: Inhale 2 puffs every 4 (four) hours as needed for wheezing   atorvastatin (LIPITOR) 40 mg tablet  Self Yes No   Sig: TAKE 1 TABLET BY MOUTH EVERY DAY AT NIGHT   budesonide (PULMICORT) 0 25 mg/2 mL nebulizer solution  Self Yes No   Sig: Inhale 0 25 mg   fenofibrate (TRIGLIDE) 160 MG tablet   Yes No   Sig: Take 160 mg by mouth daily   fluticasone-salmeterol (Advair HFA) 115-21 MCG/ACT inhaler   Yes No   Sig: Inhale 2 puffs 2 (two) times a day   fluticasone-vilanterol (BREO ELLIPTA) 200-25 MCG/INH inhaler  Self Yes No   Sig: Inhale 1 puff   gabapentin (NEURONTIN) 300 mg capsule   Yes No   Sig: Take 300 mg by mouth Three times a day   insulin lispro protamine-insulin lispro (HumaLOG 75/25) 100 units/mL  Self Yes No   Sig: Inject 15 Units under the skin    lisinopril (ZESTRIL) 20 mg tablet  Self Yes No   Sig: Take 20 mg by mouth daily   lisinopril-hydrochlorothiazide (PRINZIDE,ZESTORETIC) 20-25 MG per tablet  Self Yes No   Sig: Take 1 tablet by mouth   meloxicam (MOBIC) 15 mg tablet   Yes No   Sig: Take 15 mg by mouth daily   metFORMIN (GLUCOPHAGE) 1000 MG tablet  Self Yes No   Sig: Take 1,000 mg by mouth 2 (two) times a day with meals  methocarbamol (ROBAXIN) 500 mg tablet   No No   Sig: Take 1 tablet (500 mg total) by mouth 4 (four) times a day   Patient not taking: Reported on 4/24/2021   naproxen (NAPROSYN) 500 mg tablet  Self Yes No   Sig: Take 1 tablet by mouth every 12 (twelve) hours   nicotine (NICODERM CQ) 21 mg/24 hr TD 24 hr patch  Self No No   Sig: Place 1 patch on the skin daily   omeprazole (PriLOSEC) 20 mg delayed release capsule   No No   Sig: Take 1 capsule (20 mg total) by mouth daily   Patient not taking: Reported on 4/24/2021   ondansetron (ZOFRAN-ODT) 4 mg disintegrating tablet   No No   Sig: Take 1 tablet (4 mg total) by mouth every 6 (six) hours as needed for nausea or vomiting   Patient not taking: Reported on 4/24/2021      Facility-Administered Medications: None       Past Medical History:   Diagnosis Date    Asthma     Cardiac murmur     [facility] obstructive [facility] [address]     Diabetes mellitus ([facility] )     Hyperlipidemia     Hypertension        Past Surgical History:   Procedure Laterality Date    APPENDECTOMY      teenager        Family History   Problem Relation Age of Onset    Hypertension Mother     Hypertension Father      I have reviewed and agree with the history as documented      E-Cigarette/Vaping    E-Cigarette Use Never User      E-Cigarette/Vaping Substances    Nicotine No     THC No     CBD No     Flavoring No     Other No     Unknown No      Social History     Tobacco Use    Smoking status: Current Every Day Smoker     Packs/day: 1 00     Years: 25 00     Pack years: 25 00     Types: Cigarettes    Smokeless tobacco: Never Used   Vaping Use    Vaping Use: Never used   Substance Use Topics    Alcohol use: Yes     Comment: occassional  Drug use: No       Review of Systems   Constitutional: Positive for fatigue  Negative for appetite change, chills, diaphoresis and fever  HENT: Positive for dental problem  Negative for congestion, ear discharge, ear pain, rhinorrhea, sinus pressure, sinus pain and sore throat  Respiratory: Negative for chest tightness and shortness of breath  Cardiovascular: Negative for chest pain and leg swelling  Gastrointestinal: Negative for abdominal pain, nausea and vomiting  Endocrine: Positive for polyuria  Genitourinary: Negative for dysuria and flank pain  Musculoskeletal: Negative for back pain, neck pain and neck stiffness  Skin: Negative for wound  Neurological: Negative for dizziness, weakness, light-headedness, numbness and headaches  Hematological: Does not bruise/bleed easily  Psychiatric/Behavioral: Negative for confusion  Physical Exam  Physical Exam  Vitals reviewed  Constitutional:       General: He is not in acute distress  Appearance: He is well-developed  He is not ill-appearing, toxic-appearing or diaphoretic  HENT:      Head: Normocephalic and atraumatic  Right Ear: Tympanic membrane normal       Left Ear: Tympanic membrane normal       Mouth/Throat:      Comments: ears appear normal   external auditory canals patent without erythema or edema bilaterally  TM grey/flat bilaterally  nose normal inspection, no deformity, nares patent bilaterally  no septal hematoma, no epistaxis  mucous membranes moist, pink  tongue midline without edema  uvula midline without deviation  posterior pharynx widely patent  no posterior erythema  tonsils without edema, erythema or purulent exudate  no tongue or lip swelling present  No defined dental abscess  Does have tenderness but no evidence of abscess  No sublingual or submandibular fullness or swelling  No trismus  No drooling or pooling of secretions  Eyes:      General: No scleral icterus          Right eye: No discharge  Left eye: No discharge  Conjunctiva/sclera: Conjunctivae normal       Pupils: Pupils are equal, round, and reactive to light  Neck:      Vascular: No JVD  Cardiovascular:      Rate and Rhythm: Normal rate and regular rhythm  Heart sounds: Normal heart sounds  No murmur heard  No friction rub  No gallop  Pulmonary:      Effort: Pulmonary effort is normal  No respiratory distress  Breath sounds: Normal breath sounds  No wheezing or rales  Chest:      Chest wall: No tenderness  Abdominal:      General: Bowel sounds are normal  There is no distension  Palpations: Abdomen is soft  Tenderness: There is no abdominal tenderness  There is no guarding or rebound  Musculoskeletal:         General: No tenderness or deformity  Normal range of motion  Cervical back: Normal range of motion and neck supple  No rigidity  Lymphadenopathy:      Cervical: No cervical adenopathy  Skin:     General: Skin is warm and dry  Coloration: Skin is not pale  Findings: No erythema or rash  Neurological:      Mental Status: He is alert and oriented to person, place, and time  Cranial Nerves: No cranial nerve deficit     Psychiatric:         Behavior: Behavior normal          Vital Signs  ED Triage Vitals [06/19/21 0131]   Temperature Pulse Respirations Blood Pressure SpO2   98 1 °F (36 7 °C) 98 18 146/90 98 %      Temp src Heart Rate Source Patient Position - Orthostatic VS BP Location FiO2 (%)   -- Monitor -- Right arm --      Pain Score       No Pain           Vitals:    06/19/21 0230 06/19/21 0300 06/19/21 0330 06/19/21 0400   BP: 144/81 (!) 180/80 147/72 160/84   Pulse: 88 91 90 76         Visual Acuity      ED Medications  Medications   penicillin V potassium (VEETID) tablet 500 mg (500 mg Oral Given 6/19/21 0240)   sodium chloride 0 9 % bolus 1,000 mL (0 mL Intravenous Stopped 6/19/21 0351)   oxyCODONE-acetaminophen (PERCOCET) 5-325 mg per tablet 1 tablet (1 tablet Oral Given 6/19/21 0300)       Diagnostic Studies  Results Reviewed     Procedure Component Value Units Date/Time    Comprehensive metabolic panel [[id number]]  (Abnormal) Collected: 06/19/21 0242    Lab Status: Final result Specimen: Blood from Arm, Right Updated: 06/19/21 0307     Sodium 137 mmol/L      Potassium 3 8 mmol/L      Chloride 101 mmol/L      CO2 24 mmol/L      ANION GAP 12 mmol/L      BUN 15 mg/dL      Creatinine 0 96 mg/dL      Glucose 301 mg/dL      Calcium 8 5 mg/dL      Corrected Calcium 9 1 mg/dL      AST 15 U/L      ALT 35 U/L      Alkaline Phosphatase 106 U/L      Total Protein 7 3 g/dL      Albumin 3 3 g/dL      Total Bilirubin 0 36 mg/dL      eGFR 103 ml/min/1 73sq m     Narrative:      [place] guidelines for Chronic Kidney Disease (CKD):     Stage 1 with normal or high GFR (GFR > 90 mL/min/1 73 square meters)    Stage 2 Mild CKD (GFR = 60-89 mL/min/1 73 square meters)    Stage 3A Moderate CKD (GFR = 45-59 mL/min/1 73 square meters)    Stage 3B Moderate CKD (GFR = 30-44 mL/min/1 73 square meters)    Stage 4 Severe CKD (GFR = 15-29 mL/min/1 73 square meters)    Stage 5 End Stage CKD (GFR <15 mL/min/1 73 square meters)  Note: GFR calculation is accurate only with a steady state creatinine    Fingerstick Glucose (POCT) [[id number]]  (Abnormal) Collected: 06/19/21 0303    Lab Status: Final result Updated: 06/19/21 0305     POC Glucose 258 mg/dl     Beta Hydroxybutyrate [[id number]]  (Normal) Collected: 06/19/21 0242    Lab Status: Final result Specimen: Blood from Arm, Right Updated: 06/19/21 0251     BETA-HYDROXYBUTYRATE 0 1 mmol/L     CBC and differential [[id number]] Collected: 06/19/21 0242    Lab Status: Final result Specimen: Blood from Arm, Right Updated: 06/19/21 0249     WBC 9 58 Thousand/uL      RBC 4 95 Million/uL      Hemoglobin 14 5 g/dL      Hematocrit 43 3 %      MCV 88 fL      MCH 29 3 pg      MCHC 33 5 g/dL      RDW 13 9 %      MPV 9 7 fL      Platelets 995 Thousands/uL      nRBC 0 /100 WBCs      Neutrophils Relative 60 %      Immat GRANS % 0 %      Lymphocytes Relative 32 %      Monocytes Relative 7 %      Eosinophils Relative 1 %      Basophils Relative 0 %      Neutrophils Absolute 5 70 Thousands/µL      Immature Grans Absolute 0 02 Thousand/uL      Lymphocytes Absolute 3 08 Thousands/µL      Monocytes Absolute 0 64 Thousand/µL      Eosinophils Absolute 0 11 Thousand/µL      Basophils Absolute 0 03 Thousands/µL     Blood gas, venous [[id number]]  (Abnormal) Collected: 06/19/21 0242    Lab Status: Final result Specimen: Blood from Arm, Right Updated: 06/19/21 0249     pH, Humphrey 7 405     pCO2, Humphrey 37 0 mm Hg      pO2, Humphrey 90 8 mm Hg      HCO3, Humphrey 22 7 mmol/L      Base Excess, Humphrey -1 6 mmol/L      O2 Content, Humphrey 20 0 ml/dL      O2 HGB, VENOUS 91 9 %     Fingerstick Glucose (POCT) [[id number]]  (Abnormal) Collected: 06/19/21 0138    Lab Status: Final result Updated: 06/19/21 0141     POC Glucose 326 mg/dl                  No orders to display              Procedures  Procedures         ED Course  ED Course as of Jul 07 1551   Sat Jun 19, 2021   0256 BETA-HYDROXYBUTYRATE: 0 1   0351 Improved  Will send out on ABX for dental infection and f/u w dentist                                SBIRT 22yo+      Most Recent Value   SBIRT (22 yo +)   In order to provide better care to our patients, we are screening all of our patients for alcohol and drug use  Would it be okay to ask you these screening questions? No Filed at: 06/19/2021 0134   Initial Alcohol Screen: US AUDIT-C    1  How often do you have a drink containing alcohol?  0 Filed at: 06/19/2021 0134   2  How many drinks containing alcohol do you have on a typical day you are drinking? 0 Filed at: 06/19/2021 0134   3a  Male UNDER 65: How often do you have five or more drinks on one occasion? 0 Filed at: 06/19/2021 0134   3b  FEMALE Any Age, or MALE 65+:  How often do you have 4 or more drinks on one occassion? 0 Filed at: 06/19/2021 0134   Audit-C Score  0 Filed at: 06/19/2021 0134   MARCUS: How many times in the past year have you    Used an illegal drug or used a prescription medication for non-medical reasons? Never Filed at: 06/19/2021 0134                    MDM  Number of Diagnoses or Management Options  Dental infection  Hyperglycemia  Diagnosis management comments: Hyperglycemia without DKA  Likely having difficulty controlling Blood sugar because of dental infection  Patient is treated with PCN and advised f/u w dentist for definitive care  Advised good return precautions In case of worsening infection or signs of DKA or difficulty controlling glucose which could indicate worsening infection  DC in stable condition w stable VS         Amount and/or Complexity of Data Reviewed  Clinical lab tests: ordered and reviewed        Disposition  Final diagnoses:   Dental infection   Hyperglycemia     Time reflects when diagnosis was documented in both MDM as applicable and the Disposition within this note     Time User Action Codes Description Comment    6/19/2021  3:52 AM Jonnathan Cole [K04 7] Dental infection     6/19/2021  4:10 AM Jonnathan Cole [R73 9] Hyperglycemia       ED Disposition     ED Disposition Condition Date/Time Comment    Discharge Stable Sat Jun 19, 2021  3:52 AM Anny Shafer discharge to home/self care              Follow-up Information     Follow up With Specialties Details Why Contact Info Additional [address]  [facility] Schedule an appointment as soon as possible for a visit  For follow up to ensure improvement, and for further testing and treatment as needed [address] [place] [address]  Emergency Department Emergency Medicine  If symptoms worsen [facility] [address] [place] [address] Emergency Department, [address] [place], [place], [address] Adult and [address]   if you are unable to get sooner appt w your OMS doctor [address]  MAIR[place]/ Cesar Lewis [address]  [phone]           Discharge Medication List as of 6/19/2021  4:14 AM      START taking these medications    Details   chlorhexidine (PERIDEX) 0 12 % solution Apply 15 mL to the mouth or throat 2 (two) times a day Swish for one minute and spit, Starting Sat 6/19/2021, Normal      !! naproxen (NAPROSYN) 500 mg tablet Take 1 tablet (500 mg total) by mouth 2 (two) times a day with meals As needed for mild pain, Starting Sat 6/19/2021, Normal      oxyCODONE-acetaminophen (PERCOCET) 5-325 mg per tablet Take 1 tablet by mouth every 4 (four) hours as needed for severe pain for up to 5 daysMax Daily Amount: 6 tablets, Starting Sat 6/19/2021, Until Thu 6/24/2021 at 2359, Normal      penicillin V potassium (VEETID) 500 mg tablet Take 1 tablet (500 mg total) by mouth every 6 (six) hours for 10 days, Starting Sat 6/19/2021, Until Tue 6/29/2021, Normal       !! - Potential duplicate medications found  Please discuss with provider        CONTINUE these medications which have NOT CHANGED    Details   albuterol (PROVENTIL HFA,VENTOLIN HFA) 90 mcg/act inhaler Inhale 2 puffs every 4 (four) hours as needed for wheezing, Starting Mon 9/17/2018, Print      atorvastatin (LIPITOR) 40 mg tablet TAKE 1 TABLET BY MOUTH EVERY DAY AT NIGHT, Historical Med      budesonide (PULMICORT) 0 25 mg/2 mL nebulizer solution Inhale 0 25 mg, Starting Mon 3/4/2019, Until Sat 4/24/2021, Historical Med      fenofibrate (TRIGLIDE) 160 MG tablet Take 160 mg by mouth daily, Starting Mon 3/22/2021, Historical Med      fluticasone-salmeterol (Advair HFA) 115-21 MCG/ACT inhaler Inhale 2 puffs 2 (two) times a day, Starting Tue 2/25/2020, Until Sat 4/24/2021, Historical Med      fluticasone-vilanterol (BREO ELLIPTA) 200-25 MCG/INH inhaler Inhale 1 puff, Starting Tue 12/11/2018, Historical Med      gabapentin (NEURONTIN) 300 mg capsule Take 300 mg by mouth Three times a day, Historical Med      insulin lispro protamine-insulin lispro (HumaLOG 75/25) 100 units/mL Inject 15 Units under the skin , Starting Fri 9/28/2018, Until Sat 4/24/2021, Historical Med      Insulin Pen Needle (PEN NEEDLES 29GX1/2") 29G X 12MM MISC 1 Device by Does not apply route, Starting Thu 11/30/2017, Historical Med      Insulin Syringe-Needle U-100 31G X 1/4" 0 5 ML MISC 1 Syringe by Does not apply route, Starting Mon 10/8/2018, Historical Med      Lancet Devices (LANCING DEVICE) MISC Inject 1 Device under the skin, Starting Mon 3/5/2018, Historical Med      lisinopril (ZESTRIL) 20 mg tablet Take 20 mg by mouth daily, Historical Med      lisinopril-hydrochlorothiazide (PRINZIDE,ZESTORETIC) 20-25 MG per tablet Take 1 tablet by mouth, Starting Mon 3/5/2018, Historical Med      meloxicam (MOBIC) 15 mg tablet Take 15 mg by mouth daily, Starting Wed 4/14/2021, Historical Med      metFORMIN (GLUCOPHAGE) 1000 MG tablet Take 1,000 mg by mouth 2 (two) times a day with meals  , Historical Med      methocarbamol (ROBAXIN) 500 mg tablet Take 1 tablet (500 mg total) by mouth 4 (four) times a day, Starting Mon 3/2/2020, Normal      !! naproxen (NAPROSYN) 500 mg tablet Take 1 tablet by mouth every 12 (twelve) hours, Starting Wed 10/5/2016, Historical Med      nicotine (NICODERM CQ) 21 mg/24 hr TD 24 hr patch Place 1 patch on the skin daily, Starting Tue 12/12/2017, Print      omeprazole (PriLOSEC) 20 mg delayed release capsule Take 1 capsule (20 mg total) by mouth daily, Starting Tue 4/28/2020, Print      ondansetron (ZOFRAN-ODT) 4 mg disintegrating tablet Take 1 tablet (4 mg total) by mouth every 6 (six) hours as needed for nausea or vomiting, Starting Tue 4/28/2020, Print       !! - Potential duplicate medications found  Please discuss with provider          No discharge procedures on file     PDMP Review     None          ED Provider  Electronically Signed by           Leelee Zayas DO  07/07/21 [id number]

## 2021-11-29 ENCOUNTER — HOSPITAL ENCOUNTER (EMERGENCY)
Facility: HOSPITAL | Age: 56
Discharge: HOME | End: 2021-11-29
Attending: EMERGENCY MEDICINE
Payer: COMMERCIAL

## 2021-11-29 ENCOUNTER — APPOINTMENT (EMERGENCY)
Dept: CT IMAGING | Facility: HOSPITAL | Age: 56
End: 2021-11-29
Payer: COMMERCIAL

## 2021-11-29 VITALS
TEMPERATURE: 98.2 F | OXYGEN SATURATION: 98 % | SYSTOLIC BLOOD PRESSURE: 160 MMHG | DIASTOLIC BLOOD PRESSURE: 90 MMHG | HEART RATE: 71 BPM | BODY MASS INDEX: 24.83 KG/M2 | WEIGHT: 178 LBS | RESPIRATION RATE: 16 BRPM

## 2021-11-29 DIAGNOSIS — R10.13 EPIGASTRIC ABDOMINAL PAIN: Primary | ICD-10-CM

## 2021-11-29 LAB
ALBUMIN SERPL BCP-MCNC: 3.6 G/DL (ref 3.5–5)
ALP SERPL-CCNC: 103 U/L (ref 46–116)
ALT SERPL W P-5'-P-CCNC: 26 U/L (ref 12–78)
ANION GAP SERPL CALCULATED.3IONS-SCNC: 9 MMOL/L (ref 4–13)
AST SERPL W P-5'-P-CCNC: 11 U/L (ref 5–45)
BACTERIA UR QL AUTO: NORMAL /HPF
BASOPHILS # BLD AUTO: 0.02 THOUSANDS/ΜL (ref 0–0.1)
BASOPHILS NFR BLD AUTO: 0 % (ref 0–1)
BILIRUB SERPL-MCNC: 0.52 MG/DL (ref 0.2–1)
BILIRUB UR QL STRIP: NEGATIVE
BUN SERPL-MCNC: 12 MG/DL (ref 5–25)
CALCIUM SERPL-MCNC: 9.1 MG/DL (ref 8.3–10.1)
CHLORIDE SERPL-SCNC: 102 MMOL/L (ref 100–108)
CLARITY UR: ABNORMAL
CO2 SERPL-SCNC: 28 MMOL/L (ref 21–32)
COLOR UR: YELLOW
CREAT SERPL-MCNC: 0.86 MG/DL (ref 0.6–1.3)
EOSINOPHIL # BLD AUTO: 0.14 THOUSAND/ΜL (ref 0–0.61)
EOSINOPHIL NFR BLD AUTO: 2 % (ref 0–6)
ERYTHROCYTE [DISTWIDTH] IN BLOOD BY AUTOMATED COUNT: 13.8 % (ref 11.6–15.1)
GFR SERPL CREATININE-BSD FRML MDRD: 113 ML/MIN/1.73SQ M
GLUCOSE SERPL-MCNC: 249 MG/DL (ref 65–140)
GLUCOSE UR STRIP-MCNC: ABNORMAL MG/DL
HCT VFR BLD AUTO: 49.7 % (ref 36.5–49.3)
HGB BLD-MCNC: 15.9 G/DL (ref 12–17)
HGB UR QL STRIP.AUTO: NEGATIVE
IMM GRANULOCYTES # BLD AUTO: 0.01 THOUSAND/UL (ref 0–0.2)
IMM GRANULOCYTES NFR BLD AUTO: 0 % (ref 0–2)
KETONES UR STRIP-MCNC: NEGATIVE MG/DL
LEUKOCYTE ESTERASE UR QL STRIP: ABNORMAL
LIPASE SERPL-CCNC: 208 U/L (ref 73–393)
LYMPHOCYTES # BLD AUTO: 3.4 THOUSANDS/ΜL (ref 0.6–4.47)
LYMPHOCYTES NFR BLD AUTO: 48 % (ref 14–44)
MCH RBC QN AUTO: 28.4 PG (ref 26.8–34.3)
MCHC RBC AUTO-ENTMCNC: 32 G/DL (ref 31.4–37.4)
MCV RBC AUTO: 89 FL (ref 82–98)
MONOCYTES # BLD AUTO: 0.54 THOUSAND/ΜL (ref 0.17–1.22)
MONOCYTES NFR BLD AUTO: 8 % (ref 4–12)
NEUTROPHILS # BLD AUTO: 3.02 THOUSANDS/ΜL (ref 1.85–7.62)
NEUTS SEG NFR BLD AUTO: 42 % (ref 43–75)
NITRITE UR QL STRIP: NEGATIVE
NON-SQ EPI CELLS URNS QL MICRO: NORMAL /HPF
NRBC BLD AUTO-RTO: 0 /100 WBCS
PH UR STRIP.AUTO: 5.5 [PH]
PLATELET # BLD AUTO: 259 THOUSANDS/UL (ref 149–390)
PMV BLD AUTO: 9.7 FL (ref 8.9–12.7)
POTASSIUM SERPL-SCNC: 4 MMOL/L (ref 3.5–5.3)
PROT SERPL-MCNC: 8.1 G/DL (ref 6.4–8.2)
PROT UR STRIP-MCNC: ABNORMAL MG/DL
RBC # BLD AUTO: 5.6 MILLION/UL (ref 3.88–5.62)
RBC #/AREA URNS AUTO: NORMAL /HPF
SODIUM SERPL-SCNC: 139 MMOL/L (ref 136–145)
SP GR UR STRIP.AUTO: 1.02 (ref 1–1.03)
UROBILINOGEN UR QL STRIP.AUTO: 0.2 E.U./DL
WBC # BLD AUTO: 7.13 THOUSAND/UL (ref 4.31–10.16)
WBC #/AREA URNS AUTO: NORMAL /HPF

## 2021-11-29 PROCEDURE — 81001 URINALYSIS AUTO W/SCOPE: CPT | Performed by: EMERGENCY MEDICINE

## 2021-11-29 PROCEDURE — 83690 ASSAY OF LIPASE: CPT | Performed by: EMERGENCY MEDICINE

## 2021-11-29 PROCEDURE — G1004 CDSM NDSC: HCPCS

## 2021-11-29 PROCEDURE — 80053 COMPREHEN METABOLIC PANEL: CPT | Performed by: EMERGENCY MEDICINE

## 2021-11-29 PROCEDURE — 99284 EMERGENCY DEPT VISIT MOD MDM: CPT | Performed by: EMERGENCY MEDICINE

## 2021-11-29 PROCEDURE — 36415 COLL VENOUS BLD VENIPUNCTURE: CPT

## 2021-11-29 PROCEDURE — 74177 CT ABD & PELVIS W/CONTRAST: CPT

## 2021-11-29 PROCEDURE — 85025 COMPLETE CBC W/AUTO DIFF WBC: CPT | Performed by: EMERGENCY MEDICINE

## 2021-11-29 PROCEDURE — 99284 EMERGENCY DEPT VISIT MOD MDM: CPT

## 2021-11-29 RX ORDER — PANTOPRAZOLE SODIUM 40 MG/1
40 TABLET, DELAYED RELEASE ORAL ONCE
Status: COMPLETED | OUTPATIENT
Start: 2021-11-29 | End: 2021-11-29

## 2021-11-29 RX ORDER — PANTOPRAZOLE SODIUM 20 MG/1
20 TABLET, DELAYED RELEASE ORAL DAILY
Qty: 20 TABLET | Refills: 0 | Status: SHIPPED | OUTPATIENT
Start: 2021-11-29

## 2021-11-29 RX ADMIN — IOHEXOL 100 ML: 350 INJECTION, SOLUTION INTRAVENOUS at 18:04

## 2021-11-29 RX ADMIN — PANTOPRAZOLE SODIUM 40 MG: 40 TABLET, DELAYED RELEASE ORAL at 20:10

## 2022-01-14 ENCOUNTER — HOSPITAL ENCOUNTER (OUTPATIENT)
Facility: HOSPITAL | Age: 57
Setting detail: OBSERVATION
Discharge: LEFT BEFORE DISCHARGE | End: 2022-01-14
Attending: EMERGENCY MEDICINE | Admitting: STUDENT IN AN ORGANIZED HEALTH CARE EDUCATION/TRAINING PROGRAM
Payer: COMMERCIAL

## 2022-01-14 ENCOUNTER — APPOINTMENT (EMERGENCY)
Dept: RADIOLOGY | Facility: HOSPITAL | Age: 57
End: 2022-01-14
Payer: COMMERCIAL

## 2022-01-14 VITALS
HEART RATE: 75 BPM | HEIGHT: 71 IN | TEMPERATURE: 98.4 F | OXYGEN SATURATION: 99 % | WEIGHT: 178 LBS | SYSTOLIC BLOOD PRESSURE: 129 MMHG | BODY MASS INDEX: 24.92 KG/M2 | DIASTOLIC BLOOD PRESSURE: 79 MMHG | RESPIRATION RATE: 18 BRPM

## 2022-01-14 DIAGNOSIS — R07.9 CHEST PAIN, UNSPECIFIED TYPE: Primary | ICD-10-CM

## 2022-01-14 DIAGNOSIS — R73.9 ELEVATED BLOOD SUGAR: ICD-10-CM

## 2022-01-14 DIAGNOSIS — R55 SYNCOPE: ICD-10-CM

## 2022-01-14 PROBLEM — Z72.0 TOBACCO USE: Status: ACTIVE | Noted: 2017-12-08

## 2022-01-14 LAB
2HR DELTA HS TROPONIN: 0 NG/L
4HR DELTA HS TROPONIN: 0 NG/L
ALBUMIN SERPL BCP-MCNC: 3 G/DL (ref 3.5–5)
ALP SERPL-CCNC: 102 U/L (ref 46–116)
ALT SERPL W P-5'-P-CCNC: 41 U/L (ref 12–78)
ANION GAP SERPL CALCULATED.3IONS-SCNC: 10 MMOL/L (ref 4–13)
APTT PPP: 29 SECONDS (ref 23–37)
AST SERPL W P-5'-P-CCNC: 21 U/L (ref 5–45)
BASOPHILS # BLD AUTO: 0.02 THOUSANDS/ΜL (ref 0–0.1)
BASOPHILS NFR BLD AUTO: 0 % (ref 0–1)
BILIRUB SERPL-MCNC: 0.37 MG/DL (ref 0.2–1)
BUN SERPL-MCNC: 14 MG/DL (ref 5–25)
CALCIUM ALBUM COR SERPL-MCNC: 9.7 MG/DL (ref 8.3–10.1)
CALCIUM SERPL-MCNC: 8.9 MG/DL (ref 8.3–10.1)
CARDIAC TROPONIN I PNL SERPL HS: 9 NG/L
CHLORIDE SERPL-SCNC: 102 MMOL/L (ref 100–108)
CO2 SERPL-SCNC: 25 MMOL/L (ref 21–32)
CREAT SERPL-MCNC: 0.94 MG/DL (ref 0.6–1.3)
EOSINOPHIL # BLD AUTO: 0.09 THOUSAND/ΜL (ref 0–0.61)
EOSINOPHIL NFR BLD AUTO: 1 % (ref 0–6)
ERYTHROCYTE [DISTWIDTH] IN BLOOD BY AUTOMATED COUNT: 13.9 % (ref 11.6–15.1)
FLUAV RNA RESP QL NAA+PROBE: NEGATIVE
FLUBV RNA RESP QL NAA+PROBE: NEGATIVE
GFR SERPL CREATININE-BSD FRML MDRD: 90 ML/MIN/1.73SQ M
GLUCOSE SERPL-MCNC: 150 MG/DL (ref 65–140)
GLUCOSE SERPL-MCNC: 159 MG/DL (ref 65–140)
GLUCOSE SERPL-MCNC: 167 MG/DL (ref 65–140)
GLUCOSE SERPL-MCNC: 178 MG/DL (ref 65–140)
HCT VFR BLD AUTO: 43.1 % (ref 36.5–49.3)
HGB BLD-MCNC: 13.8 G/DL (ref 12–17)
IMM GRANULOCYTES # BLD AUTO: 0.02 THOUSAND/UL (ref 0–0.2)
IMM GRANULOCYTES NFR BLD AUTO: 0 % (ref 0–2)
INR PPP: 1.03 (ref 0.84–1.19)
LYMPHOCYTES # BLD AUTO: 2.06 THOUSANDS/ΜL (ref 0.6–4.47)
LYMPHOCYTES NFR BLD AUTO: 21 % (ref 14–44)
MCH RBC QN AUTO: 28.1 PG (ref 26.8–34.3)
MCHC RBC AUTO-ENTMCNC: 32 G/DL (ref 31.4–37.4)
MCV RBC AUTO: 88 FL (ref 82–98)
MONOCYTES # BLD AUTO: 0.73 THOUSAND/ΜL (ref 0.17–1.22)
MONOCYTES NFR BLD AUTO: 7 % (ref 4–12)
NEUTROPHILS # BLD AUTO: 7.15 THOUSANDS/ΜL (ref 1.85–7.62)
NEUTS SEG NFR BLD AUTO: 71 % (ref 43–75)
NRBC BLD AUTO-RTO: 0 /100 WBCS
PLATELET # BLD AUTO: 282 THOUSANDS/UL (ref 149–390)
PMV BLD AUTO: 9.6 FL (ref 8.9–12.7)
POTASSIUM SERPL-SCNC: 3.4 MMOL/L (ref 3.5–5.3)
PROT SERPL-MCNC: 7.2 G/DL (ref 6.4–8.2)
PROTHROMBIN TIME: 13.1 SECONDS (ref 11.6–14.5)
RBC # BLD AUTO: 4.91 MILLION/UL (ref 3.88–5.62)
RSV RNA RESP QL NAA+PROBE: NEGATIVE
SARS-COV-2 RNA RESP QL NAA+PROBE: NEGATIVE
SODIUM SERPL-SCNC: 137 MMOL/L (ref 136–145)
WBC # BLD AUTO: 10.07 THOUSAND/UL (ref 4.31–10.16)

## 2022-01-14 PROCEDURE — 80053 COMPREHEN METABOLIC PANEL: CPT | Performed by: EMERGENCY MEDICINE

## 2022-01-14 PROCEDURE — 85025 COMPLETE CBC W/AUTO DIFF WBC: CPT | Performed by: EMERGENCY MEDICINE

## 2022-01-14 PROCEDURE — NC001 PR NO CHARGE: Performed by: STUDENT IN AN ORGANIZED HEALTH CARE EDUCATION/TRAINING PROGRAM

## 2022-01-14 PROCEDURE — 99285 EMERGENCY DEPT VISIT HI MDM: CPT

## 2022-01-14 PROCEDURE — 82948 REAGENT STRIP/BLOOD GLUCOSE: CPT

## 2022-01-14 PROCEDURE — 99285 EMERGENCY DEPT VISIT HI MDM: CPT | Performed by: EMERGENCY MEDICINE

## 2022-01-14 PROCEDURE — 99220 PR INITIAL OBSERVATION CARE/DAY 70 MINUTES: CPT | Performed by: STUDENT IN AN ORGANIZED HEALTH CARE EDUCATION/TRAINING PROGRAM

## 2022-01-14 PROCEDURE — 85730 THROMBOPLASTIN TIME PARTIAL: CPT | Performed by: EMERGENCY MEDICINE

## 2022-01-14 PROCEDURE — 0241U HB NFCT DS VIR RESP RNA 4 TRGT: CPT | Performed by: EMERGENCY MEDICINE

## 2022-01-14 PROCEDURE — 71045 X-RAY EXAM CHEST 1 VIEW: CPT

## 2022-01-14 PROCEDURE — 85610 PROTHROMBIN TIME: CPT | Performed by: EMERGENCY MEDICINE

## 2022-01-14 PROCEDURE — 36415 COLL VENOUS BLD VENIPUNCTURE: CPT | Performed by: EMERGENCY MEDICINE

## 2022-01-14 PROCEDURE — 84484 ASSAY OF TROPONIN QUANT: CPT | Performed by: EMERGENCY MEDICINE

## 2022-01-14 PROCEDURE — 93005 ELECTROCARDIOGRAM TRACING: CPT

## 2022-01-14 RX ORDER — ACETAMINOPHEN 325 MG/1
650 TABLET ORAL EVERY 6 HOURS PRN
Status: DISCONTINUED | OUTPATIENT
Start: 2022-01-14 | End: 2022-01-14 | Stop reason: HOSPADM

## 2022-01-14 RX ORDER — ATORVASTATIN CALCIUM 40 MG/1
40 TABLET, FILM COATED ORAL
Status: DISCONTINUED | OUTPATIENT
Start: 2022-01-14 | End: 2022-01-14 | Stop reason: HOSPADM

## 2022-01-14 RX ORDER — NICOTINE 21 MG/24HR
1 PATCH, TRANSDERMAL 24 HOURS TRANSDERMAL DAILY
Status: DISCONTINUED | OUTPATIENT
Start: 2022-01-14 | End: 2022-01-14 | Stop reason: HOSPADM

## 2022-01-14 RX ORDER — ASPIRIN 81 MG/1
324 TABLET, CHEWABLE ORAL ONCE
Status: COMPLETED | OUTPATIENT
Start: 2022-01-14 | End: 2022-01-14

## 2022-01-14 RX ORDER — GABAPENTIN 300 MG/1
300 CAPSULE ORAL 3 TIMES DAILY
Status: DISCONTINUED | OUTPATIENT
Start: 2022-01-14 | End: 2022-01-14 | Stop reason: HOSPADM

## 2022-01-14 RX ORDER — FENOFIBRATE 48 MG/1
160 TABLET, COATED ORAL DAILY
Status: DISCONTINUED | OUTPATIENT
Start: 2022-01-14 | End: 2022-01-14 | Stop reason: HOSPADM

## 2022-01-14 RX ORDER — PANTOPRAZOLE SODIUM 40 MG/1
40 TABLET, DELAYED RELEASE ORAL
Status: DISCONTINUED | OUTPATIENT
Start: 2022-01-14 | End: 2022-01-14 | Stop reason: HOSPADM

## 2022-01-14 RX ORDER — FLUTICASONE FUROATE AND VILANTEROL 200; 25 UG/1; UG/1
1 POWDER RESPIRATORY (INHALATION) DAILY
Status: DISCONTINUED | OUTPATIENT
Start: 2022-01-14 | End: 2022-01-14 | Stop reason: HOSPADM

## 2022-01-14 RX ADMIN — ASPIRIN 324 MG: 81 TABLET, CHEWABLE ORAL at 05:21

## 2022-01-14 RX ADMIN — PANTOPRAZOLE SODIUM 40 MG: 40 TABLET, DELAYED RELEASE ORAL at 08:43

## 2022-01-14 RX ADMIN — NICOTINE 1 PATCH: 21 PATCH, EXTENDED RELEASE TRANSDERMAL at 08:43

## 2022-01-14 RX ADMIN — INSULIN LISPRO 1 UNITS: 100 INJECTION, SOLUTION INTRAVENOUS; SUBCUTANEOUS at 08:43

## 2022-01-14 RX ADMIN — FLUTICASONE FUROATE AND VILANTEROL TRIFENATATE 1 PUFF: 200; 25 POWDER RESPIRATORY (INHALATION) at 08:52

## 2022-01-14 RX ADMIN — FENOFIBRATE 168 MG: 48 TABLET, FILM COATED ORAL at 08:43

## 2022-01-14 RX ADMIN — GABAPENTIN 300 MG: 300 CAPSULE ORAL at 08:43

## 2022-01-14 NOTE — H&P
[facility] 1965, 55 y o  male MRN: [id number]  Unit/Bed#: ED 11 Encounter: [id number]  Primary Care Provider: Yessenia Valdez   Date and time admitted to hospital: 1/14/2022  4:28 AM    * Syncope  Assessment & Plan  55year old male with past medical history of syncopal episode currently has loop recorder presents with 1 episode of syncope early this morning around 4:00 a m  Troponin negative x2  EKG noted with normal sinus rhythm  Chest x-ray report noted negative for any acute finding  Overall plan was to keep under observation and have Cardiology evaluate and possibly a loop recorder interrogation since patient does have history of pause in the past   On my encounter patient appears upset and states that he never wanted to be hospitalized under observation and he was never told about it  Adamantly requesting to leave against medical advise  Currently he denies chest pain, dyspnea, nausea vomiting, diarrhea, any new complaints  Patient reports that he does have device scanner at home through his phone and he will reach out to his primary Cardiology to interrogate his loop recorder  After having lengthy discussion regarding risk and benefit patient reports that he does understand risk of leaving against medical advise including but not limited to worsening arrhythmia, syncope and/or death  Patient is significant other present at bedside at the time of the above discussion  Signed AMA form and left against medical advise with significant other  Tobacco use  Assessment & Plan  Reports smoking half pack a day  Counseled on smoking cessation  Patient is leaving against medical advise      Type 2 diabetes mellitus [facility])  Assessment & Plan  Lab Results   Component Value Date    HGBA1C 10 0 (H) 07/27/2020       Recent Labs     01/14/22  0525 01/14/22  0752 01/14/22  1140   POCGLU 167* 178* 150*       Blood Sugar Average: Last 72 hrs:  (P) 165     Present on admission history of uncontrolled diabetes  Most recent A1c noted 10 0  Plan was to resume diabetic diet and sliding scale coverage however patient has decided to leave against medical advice  Hyperlipidemia  Assessment & Plan  Present on admission history hyperlipidemia  Plan is to resume home dose of statin and Tricor however patient is living least medical advice  Hypertension  Assessment & Plan  Presents for admission hypertension  Currently controlled  Plan was to watch him without antihypertensive agent while inpatient however currently patient is requesting to leave against medical advice  VTE Pharmacologic Prophylaxis:   Moderate Risk (Score 3-4) - Pharmacological DVT Prophylaxis Ordered: heparin  Code Status: Prior level 1  Discussion with family: Significant other present at bedside  Anticipated Length of Stay: Patient will be admitted on an observation basis with an anticipated length of stay of less than 2 midnights secondary to Syncope  Total Time for Visit, including Counseling / Coordination of Care: 30 minutes Greater than 50% of this total time spent on direct patient counseling and coordination of care  Chief Complaint:  Syncope    History of Present Illness:  Santos Cockayne is a 55 y o  male with a PMH of syncope episodes, history of pause, currently has loop recorder, hypertension, hyperlipidemia, diabetes, active smoker, Denies any other substance use  who presents with episode of syncope at 4:00 a m  This morning  Patient reports that he had an episode of feeling weak last night around 11 pm when in bed and had an episode of passing out at 4 am witness by significant other  As per her, patient had lost consciousness for 3-4 seconds and after regaining consciousness he was not confused neither had any urinary or fecal incontinence  No seizure-like activity was witnessed    Currently my encounter patient reports he is feeling fine and adamantly requesting to leave Currently on my encounter patient denies chest pain, dyspnea, fever, chills, nausea, vomiting, dizziness, or  any other complaints  Upon reviewing the chart seems like patient had similar episode in the past and loop recorder had shown pause in the past as per out pt cardiology report  Telemetry reviewed and  noted with normal sinus rhythm without any evidence of arrhythmia or pauses  Currently patient reports that he was never told by ED staff that he was being admitted under observation and does not want to stay in the hospital  Currently reports that he will follow-up his primary Cardiology to have his loop recorder interrogated  I strongly recommeded patient to stay and have Cardiology evaluate the patient and also likely to have loop recorder interrogated  and cleared by cardiology prior to discharge  However he is adamant about leaving and not staying for cardiology evaluation  After having lengthy discussion patient reports that he understands risk of leaving against medical advise including but not limited to worsening arrhythmia, worsening syncopal episodes, and/or death  Significant other present at bedside at the time of the above discussion  Patient and significant other at bedside both appreciative of my care stating " you are the only doc we have seen in 8 hours but I do not want to stay here any longer"  I have encouraged to follow up with primary provider follow-up interrogation as well as also to seek immediate medical attention if he has worsening symptoms  Patient and significant other both agreeable with above plan  Patient has signed AMA form, iv is removed by nursing staff  Review of Systems:  Review of Systems   Constitutional: Negative for fatigue and fever  HENT: Negative for congestion  Eyes: Negative for visual disturbance  Respiratory: Negative for choking, chest tightness and shortness of breath  Cardiovascular: Negative for chest pain and palpitations  Gastrointestinal: Negative for abdominal pain, diarrhea, nausea and vomiting  Endocrine: Negative for polyuria  Genitourinary: Negative for dysuria  Neurological: Positive for syncope  Negative for weakness  Psychiatric/Behavioral: Negative for agitation  Past Medical and Surgical History:   Past Medical History:   Diagnosis Date    Asthma     Cardiac murmur     COPD [facility]chronic obstructive pulmonary disease) [facility] )     Diabetes mellitus ([facility] )     Hyperlipidemia     Hypertension        Past Surgical History:   Procedure Laterality Date    APPENDECTOMY      teenager        Meds/Allergies:  Prior to Admission medications    Medication Sig Start Date End Date Taking?  Authorizing Provider   albuterol (PROVENTIL HFA,VENTOLIN HFA) 90 mcg/act inhaler Inhale 2 puffs every 4 (four) hours as needed for wheezing 9/17/18   Anastacio Leonard MD   atorvastatin (LIPITOR) 40 mg tablet TAKE 1 TABLET BY MOUTH EVERY DAY AT NIGHT 2/17/19   Historical Provider, MD   budesonide (PULMICORT) 0 25 mg/2 mL nebulizer solution Inhale 0 25 mg 3/4/19 4/24/21  Historical Provider, MD   chlorhexidine (PERIDEX) 0 12 % solution Apply 15 mL to the mouth or throat 2 (two) times a day Swish for one minute and spit 6/19/21   Brianna Cole,    fenofibrate (TRIGLIDE) 160 MG tablet Take 160 mg by mouth daily 3/22/21   Historical Provider, MD   fluticasone-salmeterol (Advair HFA) 115-21 MCG/ACT inhaler Inhale 2 puffs 2 (two) times a day 2/25/20 4/24/21  Historical Provider, MD   fluticasone-vilanterol (BREO ELLIPTA) 200-25 MCG/INH inhaler Inhale 1 puff 12/11/18   Historical Provider, MD   gabapentin (NEURONTIN) 300 mg capsule Take 300 mg by mouth Three times a day    Historical Provider, MD   insulin lispro protamine-insulin lispro (HumaLOG 75/25) 100 units/mL Inject 15 Units under the skin  9/28/18 4/24/21  Historical Provider, MD   Insulin Pen Needle (PEN NEEDLES 29GX1/2") 29G X 12MM MISC 1 Device by Does not apply route 11/30/17   Historical Provider, MD   Insulin Syringe-Needle U-100 31G X 1/4" 0 5 ML MISC 1 Syringe by Does not apply route 10/8/18   Historical Provider, MD   Lancet Devices (LANCING DEVICE) MISC Inject 1 Device under the skin 3/5/18   Historical Provider, MD   lisinopril (ZESTRIL) 20 mg tablet Take 20 mg by mouth daily    Historical Provider, MD   lisinopril-hydrochlorothiazide (PRINZIDE,ZESTORETIC) 20-25 MG per tablet Take 1 tablet by mouth 3/5/18   Historical Provider, MD   meloxicam (MOBIC) 15 mg tablet Take 15 mg by mouth daily 4/14/21   Historical Provider, MD   metFORMIN (GLUCOPHAGE) 1000 MG tablet Take 1,000 mg by mouth 2 (two) times a day with meals  Historical Provider, MD   methocarbamol (ROBAXIN) 500 mg tablet Take 1 tablet (500 mg total) by mouth 4 (four) times a day  Patient not taking: Reported on 4/24/2021 3/2/20   Tameka Gordon PA-C   naproxen (NAPROSYN) 500 mg tablet Take 1 tablet by mouth every 12 (twelve) hours 10/5/16   Historical Provider, MD   naproxen (NAPROSYN) 500 mg tablet Take 1 tablet (500 mg total) by mouth 2 (two) times a day with meals As needed for mild pain 6/19/21   Ghislaine Cole,    nicotine (NICODERM CQ) 21 mg/24 hr TD 24 hr patch Place 1 patch on the skin daily 12/12/17   Iva Callahan MD   omeprazole (PriLOSEC) 20 mg delayed release capsule Take 1 capsule (20 mg total) by mouth daily  Patient not taking: Reported on 4/24/2021 4/28/20   Elizabeth Maloney DO   ondansetron (ZOFRAN-ODT) 4 mg disintegrating tablet Take 1 tablet (4 mg total) by mouth every 6 (six) hours as needed for nausea or vomiting  Patient not taking: Reported on 4/24/2021 4/28/20   Elizabeth Maloney DO   pantoprazole (PROTONIX) 20 mg tablet Take 1 tablet (20 mg total) by mouth daily 11/29/21   Elizabeth Maloney, DO     I have reviewed home medications with patient personally  Allergies: No Known Allergies    Social History:  Marital Status:       Substance Use History:   Social History Substance and Sexual Activity   Alcohol Use Yes    Comment: occassional     Social History     Tobacco Use   Smoking Status Current Some Day Smoker    Packs/day: 0 25    Years: 25 00    Pack years: 6 25    Types: Cigarettes   Smokeless Tobacco Never Used     Social History     Substance and Sexual Activity   Drug Use No       Family History:  Family History   Problem Relation Age of Onset    Hypertension Mother     Hypertension Father        Physical Exam:     Vitals:   Blood Pressure: 129/79 (01/14/22 1143)  Pulse: 75 (01/14/22 1143)  Temperature: 98 4 °F (36 9 °C) (01/14/22 0447)  Temp Source: Oral (01/14/22 0447)  Respirations: 18 (01/14/22 1143)  Height: 5' 11" (180 3 cm) (01/14/22 0447)  Weight - Scale: 80 7 kg (178 lb) (01/14/22 0447)  SpO2: 99 % (01/14/22 1143)    Physical Exam  Constitutional:       General: He is not in acute distress  Appearance: Normal appearance  He is not ill-appearing  HENT:      Head: Normocephalic and atraumatic  Nose: No rhinorrhea  Mouth/Throat:      Pharynx: No oropharyngeal exudate or posterior oropharyngeal erythema  Eyes:      Extraocular Movements: Extraocular movements intact  Conjunctiva/sclera: Conjunctivae normal       Pupils: Pupils are equal, round, and reactive to light  Cardiovascular:      Rate and Rhythm: Normal rate and regular rhythm  Pulses: Normal pulses  Heart sounds: Normal heart sounds  Pulmonary:      Effort: Pulmonary effort is normal  No respiratory distress  Breath sounds: Normal breath sounds  No stridor  No wheezing or rhonchi  Abdominal:      General: Bowel sounds are normal  There is no distension  Palpations: Abdomen is soft  Tenderness: There is no abdominal tenderness  Musculoskeletal:         General: No swelling  Normal range of motion  Cervical back: Normal range of motion and neck supple  No rigidity  Skin:     Findings: No rash     Neurological:      General: No focal deficit present  Mental Status: He is alert and oriented to person, place, and time  Mental status is at baseline  Psychiatric:         Mood and Affect: Mood normal          Behavior: Behavior normal          Additional Data:     Lab Results:  Results from last 7 days   Lab Units 01/14/22  0518   WBC Thousand/uL 10 07   HEMOGLOBIN g/dL 13 8   HEMATOCRIT % 43 1   PLATELETS Thousands/uL 282   NEUTROS PCT % 71   LYMPHS PCT % 21   MONOS PCT % 7   EOS PCT % 1     Results from last 7 days   Lab Units 01/14/22  0518   SODIUM mmol/L 137   POTASSIUM mmol/L 3 4*   CHLORIDE mmol/L 102   CO2 mmol/L 25   BUN mg/dL 14   CREATININE mg/dL 0 94   ANION GAP mmol/L 10   CALCIUM mg/dL 8 9   ALBUMIN g/dL 3 0*   TOTAL BILIRUBIN mg/dL 0 37   ALK PHOS U/L 102   ALT U/L 41   AST U/L 21   GLUCOSE RANDOM mg/dL 159*     Results from last 7 days   Lab Units 01/14/22  0518   INR  1 03     Results from last 7 days   Lab Units 01/14/22  1140 01/14/22  0752 01/14/22  0525   POC GLUCOSE mg/dl 150* 178* 167*               Imaging: Reviewed radiology reports from this admission including: chest xray  XR chest 1 view portable   ED Interpretation by Abel Sparrow MD (01/14 3795)   No acute cardiopulmonary process or osseous abnormality  Object consistent with patient's reported implanted loop recorder noted              Final Result by Vasyl Santiago MD (01/14 2391)      No acute cardiopulmonary disease  Workstation performed: [id number]             EKG and Other Studies Reviewed on Admission:   · EKG: Normal sinus rhythm  ** Please Note: This note has been constructed using a voice recognition system   **

## 2022-01-14 NOTE — Clinical Note
Case was discussed with RUPA and the patient's admission status was agreed to be Admission Status: observation status to the service of Dr Paty Cardenas

## 2022-01-14 NOTE — ED PROVIDER NOTES
Pt Name: Santos Cockayne  MRN: [id number]  [place] 1965  Age/Sex: 55 y o  male  Date of evaluation: 1/14/2022  PCP: Helen Marrero    CHIEF COMPLAINT    Chief Complaint   Patient presents with    Chest Pain    Dizziness         HPI    55 y o  male presenting with chest pain and syncope  Patient states that he was lying in bed with his wife when he began having chest pain  Shortly afterwards, he lost consciousness , states he woke up next to his wife shortly afterwards, states that she told him he was unresponsive  He notes prior episode of syncope sometime ago, has loop recorder currently implanted but is unaware of any results currently  His chest pain is now resolved  He denies trauma, fevers, nausea, vomiting, diarrhea, other symptoms  HPI      Past Medical and Surgical History    Past Medical History:   Diagnosis Date    Asthma     Cardiac murmur     COPD [facility]chronic obstructive pulmonary disease) ([facility] [address] )     Diabetes mellitus ([facility] )     Hyperlipidemia     Hypertension        Past Surgical History:   Procedure Laterality Date    APPENDECTOMY      teenager        Family History   Problem Relation Age of Onset   [employer] Hypertension Mother     Hypertension Father        Social History     Tobacco Use    Smoking status: Current Some Day Smoker     Packs/day: 0 25     Years: 25 00     Pack years: 6 25     Types: Cigarettes    Smokeless tobacco: Never Used   Vaping Use    Vaping Use: Never used   Substance Use Topics    Alcohol use: Yes     Comment: occassional    Drug use: No           Allergies    No Known Allergies    Home Medications    Prior to Admission medications    Medication Sig Start Date End Date Taking?  Authorizing Provider   albuterol (PROVENTIL HFA,VENTOLIN HFA) 90 mcg/act inhaler Inhale 2 puffs every 4 (four) hours as needed for wheezing 9/17/18   Briseida Márquez MD   atorvastatin (LIPITOR) 40 mg tablet TAKE 1 TABLET BY MOUTH EVERY DAY AT NIGHT 2/17/19   Historical Provider, MD budesonide (PULMICORT) 0 25 mg/2 mL nebulizer solution Inhale 0 25 mg 3/4/19 4/24/21  Historical Provider, MD   chlorhexidine (PERIDEX) 0 12 % solution Apply 15 mL to the mouth or throat 2 (two) times a day Swish for one minute and spit 6/19/21   Kalee Cole,    fenofibrate (TRIGLIDE) 160 MG tablet Take 160 mg by mouth daily 3/22/21   Historical Provider, MD   fluticasone-salmeterol (Advair HFA) 115-21 MCG/ACT inhaler Inhale 2 puffs 2 (two) times a day 2/25/20 4/24/21  Historical Provider, MD   fluticasone-vilanterol (BREO ELLIPTA) 200-25 MCG/INH inhaler Inhale 1 puff 12/11/18   Historical Provider, MD   gabapentin (NEURONTIN) 300 mg capsule Take 300 mg by mouth Three times a day    Historical Provider, MD   insulin lispro protamine-insulin lispro (HumaLOG 75/25) 100 units/mL Inject 15 Units under the skin  9/28/18 4/24/21  Historical Provider, MD   Insulin Pen Needle (PEN NEEDLES 29GX1/2") 29G X 12MM MISC 1 Device by Does not apply route 11/30/17   Historical Provider, MD   Insulin Syringe-Needle U-100 31G X 1/4" 0 5 ML MISC 1 Syringe by Does not apply route 10/8/18   Historical Provider, MD   Lancet Devices (LANCING DEVICE) MISC Inject 1 Device under the skin 3/5/18   Historical Provider, MD   lisinopril (ZESTRIL) 20 mg tablet Take 20 mg by mouth daily    Historical Provider, MD   lisinopril-hydrochlorothiazide (PRINZIDE,ZESTORETIC) 20-25 MG per tablet Take 1 tablet by mouth 3/5/18   Historical Provider, MD   meloxicam (MOBIC) 15 mg tablet Take 15 mg by mouth daily 4/14/21   Historical Provider, MD   metFORMIN (GLUCOPHAGE) 1000 MG tablet Take 1,000 mg by mouth 2 (two) times a day with meals      Historical Provider, MD   methocarbamol (ROBAXIN) 500 mg tablet Take 1 tablet (500 mg total) by mouth 4 (four) times a day  Patient not taking: Reported on 4/24/2021 3/2/20   Erika Garza PA-C   naproxen (NAPROSYN) 500 mg tablet Take 1 tablet by mouth every 12 (twelve) hours 10/5/16   Historical Provider, MD   naproxen (NAPROSYN) 500 mg tablet Take 1 tablet (500 mg total) by mouth 2 (two) times a day with meals As needed for mild pain 6/19/21   Monalisa Cole,    nicotine (NICODERM CQ) 21 mg/24 hr TD 24 hr patch Place 1 patch on the skin daily 12/12/17   Tex Rondon MD   omeprazole (PriLOSEC) 20 mg delayed release capsule Take 1 capsule (20 mg total) by mouth daily  Patient not taking: Reported on 4/24/2021 4/28/20   Tenna Rolling, DO   ondansetron (ZOFRAN-ODT) 4 mg disintegrating tablet Take 1 tablet (4 mg total) by mouth every 6 (six) hours as needed for nausea or vomiting  Patient not taking: Reported on 4/24/2021 4/28/20   Tenna Rolling, DO   pantoprazole (PROTONIX) 20 mg tablet Take 1 tablet (20 mg total) by mouth daily 11/29/21   Tenna Rolling, DO           Review of Systems    Review of Systems   Constitutional: Negative for appetite change, chills and diaphoresis  HENT: Negative for drooling, facial swelling, trouble swallowing and voice change  Respiratory: Negative for apnea, shortness of breath and wheezing  Cardiovascular: Positive for chest pain  Negative for leg swelling  Gastrointestinal: Negative for abdominal distention, abdominal pain, diarrhea, nausea and vomiting  Genitourinary: Negative for dysuria and urgency  Musculoskeletal: Negative for arthralgias, back pain, gait problem and neck pain  Skin: Negative for color change, rash and wound  Neurological: Positive for syncope  Negative for seizures, speech difficulty, weakness and headaches  Psychiatric/Behavioral: Negative for agitation, behavioral problems and dysphoric mood  The patient is not nervous/anxious  All other systems reviewed and negative      Physical Exam      ED Triage Vitals   Temperature Pulse Respirations Blood Pressure SpO2   01/14/22 0447 01/14/22 0445 01/14/22 0445 01/14/22 0445 01/14/22 0445   98 4 °F (36 9 °C) 85 (!) 24 100/61 94 %      Temp Source Heart Rate Source Patient Position - Orthostatic VS BP Location FiO2 (%)   01/14/22 0447 01/14/22 0447 01/14/22 0447 01/14/22 0447 --   Oral Monitor Lying Right arm       Pain Score       01/14/22 0447       No Pain               Physical Exam  Vitals and nursing note reviewed  Constitutional:       Appearance: He is well-developed  HENT:      Head: Normocephalic and atraumatic  Right Ear: External ear normal       Left Ear: External ear normal    Eyes:      Conjunctiva/sclera: Conjunctivae normal       Pupils: Pupils are equal, round, and reactive to light  Neck:      Trachea: No tracheal deviation  Cardiovascular:      Rate and Rhythm: Normal rate and regular rhythm  Heart sounds: Normal heart sounds  No murmur heard  Pulmonary:      Effort: Pulmonary effort is normal  No respiratory distress  Breath sounds: Normal breath sounds  No stridor  No wheezing or rales  Abdominal:      General: There is no distension  Palpations: Abdomen is soft  Tenderness: There is no abdominal tenderness  There is no guarding or rebound  Musculoskeletal:         General: No deformity  Normal range of motion  Cervical back: Normal range of motion and neck supple  Skin:     General: Skin is warm and dry  Findings: No rash  Neurological:      Mental Status: He is alert and oriented to person, place, and time  Psychiatric:         Behavior: Behavior normal          Thought Content:  Thought content normal          Judgment: Judgment normal               Diagnostic Results    EKG Interpretation    Rate:  86  BPM  Rhythm:  Normal Sinus Rhythm   Axis:  Normal   Intervals: Normal, no blocks, QTc  428 ms  Q waves:  Q-waves in septal leads  T waves:  Nonspecific changes in inferior leads  ST segments:  No significant elevations or depressions     Impression:  Normal sinus rhythm nonspecific T-wave changes and Q-waves in septal leads without evidence of acute ischemia or significant arrhythmia      EKG for comparison:  EKG dated 15 September 2020 similar in character although no evidence of inferior infarct in that study    EKG interpreted by me  Labs:    Results Reviewed     Procedure Component Value Units Date/Time    COVID/FLU/RSV - 2 hour TAT [[id number]]  (Normal) Collected: 01/14/22 0518    Lab Status: Final result Specimen: Nares from Nose Updated: 01/14/22 0638     SARS-CoV-2 Negative     INFLUENZA A PCR Negative     INFLUENZA B PCR Negative     RSV PCR Negative    Narrative:      FOR PEDIATRIC PATIENTS - copy/paste COVID Guidelines URL to browser: [email or web address]  [email or web address]    SARS-CoV-2 assay is a Nucleic Acid Amplification assay intended for the  qualitative detection of nucleic acid from SARS-CoV-2 in nasopharyngeal  swabs  Results are for the presumptive identification of SARS-CoV-2 RNA  Positive results are indicative of infection with SARS-CoV-2, the virus  causing COVID-19, but do not rule out bacterial infection or co-infection  with other viruses  Laboratories within the [place] and its  territories are required to report all positive results to the appropriate  public health authorities  Negative results do not preclude SARS-CoV-2  infection and should not be used as the sole basis for treatment or other  patient management decisions  Negative results must be combined with  clinical observations, patient history, and epidemiological information  This test has not been FDA cleared or approved  This test has been authorized by FDA under an Emergency Use Authorization  (EUA)  This test is only authorized for the duration of time the  declaration that circumstances exist justifying the authorization of the  emergency use of an in vitro diagnostic tests for detection of SARS-CoV-2  virus and/or diagnosis of COVID-19 infection under section 564(b)(1) of  the Act, 21 U  S C  779RCJ-2(I)(2), unless the authorization is terminated  or revoked sooner  The test has been validated but independent review by FDA  and CLIA is pending  Test performed using [employer] Gene[employer]pert: This RT-PCR assay targets N2,  a region unique to SARS-CoV-2  A conserved region in the E-gene was chosen  for pan-Sarbecovirus detection which includes SARS-CoV-2      HS Troponin 0hr (reflex protocol) [[id number]]  (Normal) Collected: 01/14/22 0518    Lab Status: Final result Specimen: Blood from Hand, Right Updated: 01/14/22 0613     hs TnI 0hr 9 ng/L     HS Troponin I 2hr [[id number]]     Lab Status: No result Specimen: Blood     Comprehensive metabolic panel [[id number]]  (Abnormal) Collected: 01/14/22 0518    Lab Status: Final result Specimen: Blood from Hand, Right Updated: 01/14/22 0604     Sodium 137 mmol/L      Potassium 3 4 mmol/L      Chloride 102 mmol/L      CO2 25 mmol/L      ANION GAP 10 mmol/L      BUN 14 mg/dL      Creatinine 0 94 mg/dL      Glucose 159 mg/dL      Calcium 8 9 mg/dL      Corrected Calcium 9 7 mg/dL      AST 21 U/L      ALT 41 U/L      Alkaline Phosphatase 102 U/L      Total Protein 7 2 g/dL      Albumin 3 0 g/dL      Total Bilirubin 0 37 mg/dL      eGFR 90 ml/min/1 73sq m     Narrative:      [place] guidelines for Chronic Kidney Disease (CKD):     Stage 1 with normal or high GFR (GFR > 90 mL/min/1 73 square meters)    Stage 2 Mild CKD (GFR = 60-89 mL/min/1 73 square meters)    Stage 3A Moderate CKD (GFR = 45-59 mL/min/1 73 square meters)    Stage 3B Moderate CKD (GFR = 30-44 mL/min/1 73 square meters)    Stage 4 Severe CKD (GFR = 15-29 mL/min/1 73 square meters)    Stage 5 End Stage CKD (GFR <15 mL/min/1 73 square meters)  Note: GFR calculation is accurate only with a steady state creatinine    Protime-INR [[id number]]  (Normal) Collected: 01/14/22 0518    Lab Status: Final result Specimen: Blood from Arm, Right Updated: 01/14/22 0554     Protime 13 1 seconds      INR 1 03    APTT [[id number]]  (Normal) Collected: 01/14/22 0518 Lab Status: Final result Specimen: Blood from Arm, Right Updated: 01/14/22 0554     PTT 29 seconds     CBC and differential [[id number]] Collected: 01/14/22 0518    Lab Status: Final result Specimen: Blood from Hand, Right Updated: 01/14/22 0538     WBC 10 07 Thousand/uL      RBC 4 91 Million/uL      Hemoglobin 13 8 g/dL      Hematocrit 43 1 %      MCV 88 fL      MCH 28 1 pg      MCHC 32 0 g/dL      RDW 13 9 %      MPV 9 6 fL      Platelets 666 Thousands/uL      nRBC 0 /100 WBCs      Neutrophils Relative 71 %      Immat GRANS % 0 %      Lymphocytes Relative 21 %      Monocytes Relative 7 %      Eosinophils Relative 1 %      Basophils Relative 0 %      Neutrophils Absolute 7 15 Thousands/µL      Immature Grans Absolute 0 02 Thousand/uL      Lymphocytes Absolute 2 06 Thousands/µL      Monocytes Absolute 0 73 Thousand/µL      Eosinophils Absolute 0 09 Thousand/µL      Basophils Absolute 0 02 Thousands/µL     Fingerstick Glucose (POCT) [[id number]]  (Abnormal) Collected: 01/14/22 0525    Lab Status: Final result Updated: 01/14/22 0530     POC Glucose 167 mg/dl           All labs reviewed and utilized in the medical decision making process    Radiology:    XR chest 1 view portable   ED Interpretation   No acute cardiopulmonary process or osseous abnormality  Object consistent with patient's reported implanted loop recorder noted                  All radiology studies independently viewed by me and interpreted by the radiologist     Procedure    Procedures        ED Course of Care and Re-Assessments      Given aspirin in emergency department  Medications   aspirin chewable tablet 324 mg (324 mg Oral Given 1/14/22 0521)           FINAL IMPRESSION    Final diagnoses:   Chest pain, unspecified type   Syncope   Elevated blood sugar         DISPOSITION/PLAN    Presentation as above with syncope in the setting of chest pain in patient with multiple cardiac risk factors    Initial troponin of 9, EKG nonspecific but changed from priors  Low suspicion for PE, aortic dissection  Significant concern for cardiogenic syncope, particularly arrhythmia  After initial workup in emergency department, admitted Internal Medicine for further care, hemodynamically stable and comfortable at that time  Time reflects when diagnosis was documented in both MDM as applicable and the Disposition within this note     Time User Action Codes Description Comment    1/14/2022  7:14 AM [place] Benddevyn T Add [R07 9] Chest pain, unspecified type     1/14/2022  7:14 AM [place] Benders T Add [R55] Syncope     1/14/2022  7:14 AM [place] Benders T Add [R73 9] Elevated blood sugar       ED Disposition     ED Disposition Condition Date/Time Comment    Admit Stable Fri Jan 14, 2022  7:14 AM Case was discussed with RUPA and the patient's admission status was agreed to be Admission Status: observation status to the service of Dr Cinthya Ingram   Follow-up Information    None           PATIENT REFERRED TO:    No follow-up provider specified  DISCHARGE MEDICATIONS:    Patient's Medications   Discharge Prescriptions    No medications on file       No discharge procedures on file  Jorge Navarro MD    Portions of the record may have been created with voice recognition software  Occasional wrong word or "sound alike" substitutions may have occurred due to the inherent limitations of voice recognition software    Please read the chart carefully and recognize, using context, where substitutions have occurred     Jorge Navarro MD  01/14/22 0756

## 2022-01-14 NOTE — DISCHARGE SUMMARY
[address] [facility]  Discharge- Santos Cockayne 1965, 55 y o  male MRN: [id number]  Unit/Bed#: ED 11 Encounter: [id number]  Primary Care Provider: Helen Marrero   Date and time admitted to hospital: 1/14/2022  4:28 AM      LEFT AGAINST MEDICAL ADVISE    * Syncope  Assessment & Plan  55year old male with past medical history of syncopal episode currently has loop recorder presents with 1 episode of syncope early this morning around 4:00 a m  Troponin negative x2  EKG noted with normal sinus rhythm  Chest x-ray report noted negative for any acute finding  Overall plan was to keep under observation and have Cardiology evaluate and possibly a loop recorder interrogation since patient does have history of pause in the past   On my encounter patient appears upset and states that he never wanted to be hospitalized under observation and he was never told about it  Adamantly requesting to leave against medical advise  Currently he denies chest pain, dyspnea, nausea vomiting, diarrhea, any new complaints  Patient reports that he does have device scanner at home through his phone and he will reach out to his primary Cardiology to interrogate his loop recorder  After having lengthy discussion regarding risk and benefit patient reports that he does understand risk of leaving against medical advise including but not limited to worsening arrhythmia, syncope and/or death  Patient is significant other present at bedside at the time of the above discussion  Signed AMA form and left against medical advise with significant other  Tobacco use  Assessment & Plan  Reports smoking half pack a day  Counseled on smoking cessation  Patient is leaving against medical advise      Type 2 diabetes mellitus [facility])  Assessment & Plan  Lab Results   Component Value Date    HGBA1C 10 0 (H) 07/27/2020       Recent Labs     01/14/22  0525 01/14/22  0752 01/14/22  1140   POCGLU 167* 178* 150*       Blood Sugar Average: Last 72 hrs:  (P) 165     Present on admission history of uncontrolled diabetes  Most recent A1c noted 10 0  Plan was to resume diabetic diet and sliding scale coverage however patient has decided to leave against medical advice  Hyperlipidemia  Assessment & Plan  Present on admission history hyperlipidemia  Plan is to resume home dose of statin and Tricor however patient is living least medical advice  Hypertension  Assessment & Plan  Presents for admission hypertension  Currently controlled  Plan was to watch him without antihypertensive agent while inpatient however currently patient is requesting to leave against medical advice  Medical Problems             Resolved Problems  Date Reviewed: 1/14/2022    None              Discharging Physician / Practitioner: Rahul Yang MD  PCP: Cristina Carrera  Admission Date:   Admission Orders (From admission, onward)     Ordered        01/14/22 0716  Place in Observation  Once                      Discharge Date: 01/14/22    Reason for Admission:  Syncope    Hospital Course:   Julisa Fox is a 55 y o  male patient Julisa Fox is a 55 y o  male with a PMH of syncope episodes, history of pause, currently has loop recorder, hypertension, hyperlipidemia, diabetes, active smoker, Denies any other substance use  who presents with episode of syncope at 4:00 a m  This morning  Patient reports that he had an episode of feeling weak last night around 11 pm when in bed and had an episode of passing out at 4 am witness by significant other  As per her, patient had lost consciousness for 3-4 seconds and after regaining consciousness he was not confused neither had any urinary or fecal incontinence  No seizure-like activity was witnessed  Currently my encounter patient reports he is feeling fine and adamantly requesting to leave  Currently on my encounter patient denies chest pain, dyspnea, fever, chills, nausea, vomiting, dizziness, or  any other complaints        Upon reviewing the chart seems like patient had similar episode in the past and loop recorder had shown pause in the past as per out pt cardiology report  Telemetry reviewed and  noted with normal sinus rhythm without any evidence of arrhythmia or pauses  Currently patient reports that he was never told by ED staff that he was being admitted under observation and does not want to stay in the hospital  Currently reports that he will follow-up his primary Cardiology to have his loop recorder interrogated  I strongly recommeded patient to stay and have Cardiology evaluate the patient and also likely to have loop recorder interrogated  and cleared by cardiology prior to discharge  However he is adamant about leaving and not staying for cardiology evaluation  Patient is awake, alert, oriented x3  Does have good insight and capacity to make medical decision  After having lengthy discussion patient reports that he understands risk of leaving against medical advise including but not limited to worsening arrhythmia, worsening syncopal episodes, and/or death  Significant other present at bedside at the time of the above discussion  Patient and significant other at bedside both appreciative of my care stating " you are the only doc we have seen in 8 hours but I do not want to stay here any longer"  I have encouraged to follow up with primary provider follow-up interrogation as well as also to seek immediate medical attention if he has worsening symptoms  Patient and significant other both agreeable with above plan  Patient has signed AMA form, iv is removed by nursing staff          Please see above list of diagnoses and related plan for additional information

## 2022-01-15 NOTE — ASSESSMENT & PLAN NOTE
Lab Results   Component Value Date    HGBA1C 10 0 (H) 07/27/2020       Recent Labs     01/14/22  0525 01/14/22  0752 01/14/22  1140   POCGLU 167* 178* 150*       Blood Sugar Average: Last 72 hrs:  (P) 165     Present on admission history of uncontrolled diabetes  Most recent A1c noted 10 0  Plan was to resume diabetic diet and sliding scale coverage however patient has decided to leave against medical advice

## 2022-01-15 NOTE — ASSESSMENT & PLAN NOTE
Present on admission history hyperlipidemia  Plan is to resume home dose of statin and Tricor however patient is living least medical advice

## 2022-01-15 NOTE — ASSESSMENT & PLAN NOTE
55year old male with past medical history of syncopal episode currently has loop recorder presents with 1 episode of syncope early this morning around 4:00 a m  Troponin negative x2  EKG noted with normal sinus rhythm  Chest x-ray report noted negative for any acute finding  Overall plan was to keep under observation and have Cardiology evaluate and possibly a loop recorder interrogation since patient does have history of pause in the past   On my encounter patient appears upset and states that he never wanted to be hospitalized under observation and he was never told about it  Adamantly requesting to leave against medical advise  Currently he denies chest pain, dyspnea, nausea vomiting, diarrhea, any new complaints  Patient reports that he does have device scanner at home through his phone and he will reach out to his primary Cardiology to interrogate his loop recorder  After having lengthy discussion regarding risk and benefit patient reports that he does understand risk of leaving against medical advise including but not limited to worsening arrhythmia, syncope and/or death  Patient is significant other present at bedside at the time of the above discussion  Signed AMA form and left against medical advise with significant other

## 2022-01-15 NOTE — ASSESSMENT & PLAN NOTE
Reports smoking half pack a day  Counseled on smoking cessation  Patient is leaving against medical advise

## 2022-01-15 NOTE — ASSESSMENT & PLAN NOTE
Presents for admission hypertension  Currently controlled  Plan was to watch him without antihypertensive agent while inpatient however currently patient is requesting to leave against medical advice

## 2022-01-16 LAB
ATRIAL RATE: 72 BPM
ATRIAL RATE: 86 BPM
P AXIS: 44 DEGREES
P AXIS: 58 DEGREES
PR INTERVAL: 168 MS
PR INTERVAL: 172 MS
QRS AXIS: -14 DEGREES
QRS AXIS: -25 DEGREES
QRSD INTERVAL: 86 MS
QRSD INTERVAL: 90 MS
QT INTERVAL: 358 MS
QT INTERVAL: 384 MS
QTC INTERVAL: 420 MS
QTC INTERVAL: 428 MS
T WAVE AXIS: 18 DEGREES
T WAVE AXIS: 30 DEGREES
VENTRICULAR RATE: 72 BPM
VENTRICULAR RATE: 86 BPM

## 2022-01-16 PROCEDURE — 93010 ELECTROCARDIOGRAM REPORT: CPT | Performed by: INTERNAL MEDICINE

## 2022-09-12 ENCOUNTER — HOSPITAL ENCOUNTER (EMERGENCY)
Facility: HOSPITAL | Age: 57
Discharge: HOME | End: 2022-09-12
Attending: EMERGENCY MEDICINE
Payer: COMMERCIAL

## 2022-09-12 ENCOUNTER — APPOINTMENT (EMERGENCY)
Dept: CT IMAGING | Facility: HOSPITAL | Age: 57
End: 2022-09-12
Payer: COMMERCIAL

## 2022-09-12 VITALS
RESPIRATION RATE: 14 BRPM | DIASTOLIC BLOOD PRESSURE: 77 MMHG | OXYGEN SATURATION: 98 % | TEMPERATURE: 98 F | HEART RATE: 67 BPM | SYSTOLIC BLOOD PRESSURE: 148 MMHG

## 2022-09-12 DIAGNOSIS — K20.90 ESOPHAGITIS: ICD-10-CM

## 2022-09-12 DIAGNOSIS — K85.90 PANCREATITIS: Primary | ICD-10-CM

## 2022-09-12 LAB
ALBUMIN SERPL BCP-MCNC: 3.4 G/DL (ref 3.5–5)
ALP SERPL-CCNC: 86 U/L (ref 46–116)
ALT SERPL W P-5'-P-CCNC: 21 U/L (ref 12–78)
ANION GAP SERPL CALCULATED.3IONS-SCNC: 15 MMOL/L (ref 4–13)
AST SERPL W P-5'-P-CCNC: 13 U/L (ref 5–45)
BASOPHILS # BLD AUTO: 0.02 THOUSANDS/ΜL (ref 0–0.1)
BASOPHILS NFR BLD AUTO: 0 % (ref 0–1)
BILIRUB SERPL-MCNC: 0.78 MG/DL (ref 0.2–1)
BUN SERPL-MCNC: 11 MG/DL (ref 5–25)
CALCIUM ALBUM COR SERPL-MCNC: 9.1 MG/DL (ref 8.3–10.1)
CALCIUM SERPL-MCNC: 8.6 MG/DL (ref 8.3–10.1)
CHLORIDE SERPL-SCNC: 101 MMOL/L (ref 96–108)
CO2 SERPL-SCNC: 20 MMOL/L (ref 21–32)
CREAT SERPL-MCNC: 0.7 MG/DL (ref 0.6–1.3)
EOSINOPHIL # BLD AUTO: 0.19 THOUSAND/ΜL (ref 0–0.61)
EOSINOPHIL NFR BLD AUTO: 2 % (ref 0–6)
ERYTHROCYTE [DISTWIDTH] IN BLOOD BY AUTOMATED COUNT: 13.5 % (ref 11.6–15.1)
GFR SERPL CREATININE-BSD FRML MDRD: 105 ML/MIN/1.73SQ M
GLUCOSE SERPL-MCNC: 124 MG/DL (ref 65–140)
HCT VFR BLD AUTO: 43.8 % (ref 36.5–49.3)
HGB BLD-MCNC: 14.5 G/DL (ref 12–17)
IMM GRANULOCYTES # BLD AUTO: 0.01 THOUSAND/UL (ref 0–0.2)
IMM GRANULOCYTES NFR BLD AUTO: 0 % (ref 0–2)
LIPASE SERPL-CCNC: 117 U/L (ref 73–393)
LYMPHOCYTES # BLD AUTO: 3.57 THOUSANDS/ΜL (ref 0.6–4.47)
LYMPHOCYTES NFR BLD AUTO: 43 % (ref 14–44)
MCH RBC QN AUTO: 29.2 PG (ref 26.8–34.3)
MCHC RBC AUTO-ENTMCNC: 33.1 G/DL (ref 31.4–37.4)
MCV RBC AUTO: 88 FL (ref 82–98)
MONOCYTES # BLD AUTO: 0.56 THOUSAND/ΜL (ref 0.17–1.22)
MONOCYTES NFR BLD AUTO: 7 % (ref 4–12)
NEUTROPHILS # BLD AUTO: 3.91 THOUSANDS/ΜL (ref 1.85–7.62)
NEUTS SEG NFR BLD AUTO: 48 % (ref 43–75)
NRBC BLD AUTO-RTO: 0 /100 WBCS
PLATELET # BLD AUTO: 301 THOUSANDS/UL (ref 149–390)
PMV BLD AUTO: 9.3 FL (ref 8.9–12.7)
POTASSIUM SERPL-SCNC: 3.8 MMOL/L (ref 3.5–5.3)
PROT SERPL-MCNC: 7.8 G/DL (ref 6.4–8.4)
RBC # BLD AUTO: 4.97 MILLION/UL (ref 3.88–5.62)
SODIUM SERPL-SCNC: 136 MMOL/L (ref 135–147)
WBC # BLD AUTO: 8.26 THOUSAND/UL (ref 4.31–10.16)

## 2022-09-12 PROCEDURE — 99285 EMERGENCY DEPT VISIT HI MDM: CPT | Performed by: EMERGENCY MEDICINE

## 2022-09-12 PROCEDURE — 99284 EMERGENCY DEPT VISIT MOD MDM: CPT

## 2022-09-12 PROCEDURE — 80053 COMPREHEN METABOLIC PANEL: CPT | Performed by: EMERGENCY MEDICINE

## 2022-09-12 PROCEDURE — 36415 COLL VENOUS BLD VENIPUNCTURE: CPT | Performed by: EMERGENCY MEDICINE

## 2022-09-12 PROCEDURE — 83690 ASSAY OF LIPASE: CPT | Performed by: EMERGENCY MEDICINE

## 2022-09-12 PROCEDURE — 85025 COMPLETE CBC W/AUTO DIFF WBC: CPT | Performed by: EMERGENCY MEDICINE

## 2022-09-12 PROCEDURE — 74176 CT ABD & PELVIS W/O CONTRAST: CPT

## 2022-09-12 PROCEDURE — 96375 TX/PRO/DX INJ NEW DRUG ADDON: CPT

## 2022-09-12 PROCEDURE — G1004 CDSM NDSC: HCPCS

## 2022-09-12 PROCEDURE — 96374 THER/PROPH/DIAG INJ IV PUSH: CPT

## 2022-09-12 PROCEDURE — 96361 HYDRATE IV INFUSION ADD-ON: CPT

## 2022-09-12 RX ORDER — MORPHINE SULFATE 4 MG/ML
4 INJECTION, SOLUTION INTRAMUSCULAR; INTRAVENOUS ONCE
Status: COMPLETED | OUTPATIENT
Start: 2022-09-12 | End: 2022-09-12

## 2022-09-12 RX ORDER — SUCRALFATE ORAL 1 G/10ML
1 SUSPENSION ORAL 4 TIMES DAILY
Qty: 400 ML | Refills: 0 | Status: SHIPPED | OUTPATIENT
Start: 2022-09-12 | End: 2022-09-22

## 2022-09-12 RX ORDER — MAGNESIUM HYDROXIDE/ALUMINUM HYDROXICE/SIMETHICONE 120; 1200; 1200 MG/30ML; MG/30ML; MG/30ML
30 SUSPENSION ORAL ONCE
Status: COMPLETED | OUTPATIENT
Start: 2022-09-12 | End: 2022-09-12

## 2022-09-12 RX ORDER — ONDANSETRON 4 MG/1
4 TABLET, ORALLY DISINTEGRATING ORAL EVERY 6 HOURS PRN
Qty: 12 TABLET | Refills: 0 | Status: SHIPPED | OUTPATIENT
Start: 2022-09-12

## 2022-09-12 RX ORDER — SUCRALFATE 1 G/1
1 TABLET ORAL ONCE
Status: COMPLETED | OUTPATIENT
Start: 2022-09-12 | End: 2022-09-12

## 2022-09-12 RX ORDER — OMEPRAZOLE 20 MG/1
20 CAPSULE, DELAYED RELEASE ORAL DAILY
Qty: 30 CAPSULE | Refills: 0 | Status: SHIPPED | OUTPATIENT
Start: 2022-09-12

## 2022-09-12 RX ORDER — ONDANSETRON 2 MG/ML
4 INJECTION INTRAMUSCULAR; INTRAVENOUS ONCE
Status: COMPLETED | OUTPATIENT
Start: 2022-09-12 | End: 2022-09-12

## 2022-09-12 RX ADMIN — ONDANSETRON 4 MG: 2 INJECTION INTRAMUSCULAR; INTRAVENOUS at 18:36

## 2022-09-12 RX ADMIN — MORPHINE SULFATE 4 MG: 4 INJECTION INTRAVENOUS at 18:36

## 2022-09-12 RX ADMIN — SODIUM CHLORIDE 1000 ML: 0.9 INJECTION, SOLUTION INTRAVENOUS at 17:57

## 2022-09-12 RX ADMIN — SUCRALFATE 1 G: 1 TABLET ORAL at 20:18

## 2022-09-12 RX ADMIN — ALUMINUM HYDROXIDE, MAGNESIUM HYDROXIDE, AND SIMETHICONE 30 ML: 200; 200; 20 SUSPENSION ORAL at 20:18

## 2022-09-12 NOTE — ED PROVIDER NOTES
History  Chief Complaint   Patient presents with    Abdominal Pain     PT arrived VIA EMS  C/o of abdominal & back pain x3 weeks  Hx of pancreatitis  Abdominal Pain  Pain location:  Epigastric  Pain quality: aching    Pain radiates to:  Back  Pain severity:  Moderate  Onset quality:  Gradual  Duration:  3 weeks  Timing:  Constant  Progression:  Waxing and waning  Chronicity:  Recurrent  Context: previous surgery (prior appe)    Context: not retching    Relieved by:  Nothing  Worsened by:  Eating  Ineffective treatments:  None tried  Associated symptoms: nausea    Associated symptoms: no chills, no constipation, no diarrhea, no dysuria, no fatigue, no fever, no hematuria and no vomiting        Prior to Admission Medications   Prescriptions Last Dose Informant Patient Reported? Taking?    Insulin Pen Needle (PEN NEEDLES 29GX1/2") 29G X 12MM MISC  Self Yes No   Si Device by Does not apply route   Insulin Syringe-Needle U-100 31G X 1/4" 0 5 ML MISC  Self Yes No   Si Syringe by Does not apply route   Lancet Devices (LANCING DEVICE) MISC  Self Yes No   Sig: Inject 1 Device under the skin   albuterol (PROVENTIL HFA,VENTOLIN HFA) 90 mcg/act inhaler  Self No No   Sig: Inhale 2 puffs every 4 (four) hours as needed for wheezing   atorvastatin (LIPITOR) 40 mg tablet  Self Yes No   Sig: TAKE 1 TABLET BY MOUTH EVERY DAY AT NIGHT   budesonide (PULMICORT) 0 25 mg/2 mL nebulizer solution  Self Yes No   Sig: Inhale 0 25 mg   chlorhexidine (PERIDEX) 0 12 % solution   No No   Sig: Apply 15 mL to the mouth or throat 2 (two) times a day Swish for one minute and spit   fenofibrate (TRIGLIDE) 160 MG tablet   Yes No   Sig: Take 160 mg by mouth daily   fluticasone-salmeterol (Advair HFA) 115-21 MCG/ACT inhaler   Yes No   Sig: Inhale 2 puffs 2 (two) times a day   fluticasone-vilanterol (BREO ELLIPTA) 200-25 MCG/INH inhaler  Self Yes No   Sig: Inhale 1 puff   gabapentin (NEURONTIN) 300 mg capsule   Yes No   Sig: Take 300 mg by mouth Three times a day   insulin lispro protamine-insulin lispro (HumaLOG 75/25) 100 units/mL  Self Yes No   Sig: Inject 15 Units under the skin    lisinopril (ZESTRIL) 20 mg tablet  Self Yes No   Sig: Take 20 mg by mouth daily   lisinopril-hydrochlorothiazide (PRINZIDE,ZESTORETIC) 20-25 MG per tablet  Self Yes No   Sig: Take 1 tablet by mouth   meloxicam (MOBIC) 15 mg tablet   Yes No   Sig: Take 15 mg by mouth daily   metFORMIN (GLUCOPHAGE) 1000 MG tablet  Self Yes No   Sig: Take 1,000 mg by mouth 2 (two) times a day with meals     methocarbamol (ROBAXIN) 500 mg tablet   No No   Sig: Take 1 tablet (500 mg total) by mouth 4 (four) times a day   Patient not taking: Reported on 4/24/2021   naproxen (NAPROSYN) 500 mg tablet  Self Yes No   Sig: Take 1 tablet by mouth every 12 (twelve) hours   naproxen (NAPROSYN) 500 mg tablet   No No   Sig: Take 1 tablet (500 mg total) by mouth 2 (two) times a day with meals As needed for mild pain   nicotine (NICODERM CQ) 21 mg/24 hr TD 24 hr patch  Self No No   Sig: Place 1 patch on the skin daily   omeprazole (PriLOSEC) 20 mg delayed release capsule   No No   Sig: Take 1 capsule (20 mg total) by mouth daily   Patient not taking: Reported on 4/24/2021   omeprazole (PriLOSEC) 20 mg delayed release capsule   No Yes   Sig: Take 1 capsule (20 mg total) by mouth daily   ondansetron (ZOFRAN-ODT) 4 mg disintegrating tablet   No No   Sig: Take 1 tablet (4 mg total) by mouth every 6 (six) hours as needed for nausea or vomiting   Patient not taking: Reported on 4/24/2021   ondansetron (ZOFRAN-ODT) 4 mg disintegrating tablet   No Yes   Sig: Take 1 tablet (4 mg total) by mouth every 6 (six) hours as needed for nausea or vomiting   pantoprazole (PROTONIX) 20 mg tablet   No No   Sig: Take 1 tablet (20 mg total) by mouth daily      Facility-Administered Medications: None       Past Medical History:   Diagnosis Date    Asthma     Cardiac murmur     COPD [facility]chronic obstructive pulmonary disease) ([facility] )     Diabetes mellitus ([facility] )     Hyperlipidemia     Hypertension        Past Surgical History:   Procedure Laterality Date    APPENDECTOMY      teenager        Family History   Problem Relation Age of Onset    Hypertension Mother     Hypertension Father      I have reviewed and agree with the history as documented  E-Cigarette/Vaping    E-Cigarette Use Never User      E-Cigarette/Vaping Substances    Nicotine No     THC No     CBD No     Flavoring No     Other No     Unknown No      Social History     Tobacco Use    Smoking status: Current Some Day Smoker     Packs/day: 0 25     Years: 25 00     Pack years: 6 25     Types: Cigarettes    Smokeless tobacco: Never Used   Vaping Use    Vaping Use: Never used   Substance Use Topics    Alcohol use: Yes     Comment: occassional    Drug use: No       Review of Systems   Constitutional: Negative for chills, fatigue and fever  Gastrointestinal: Positive for abdominal pain and nausea  Negative for constipation, diarrhea and vomiting  Genitourinary: Negative for dysuria and hematuria  All other systems reviewed and are negative  Physical Exam  Physical Exam  Vitals and nursing note reviewed  Constitutional:       General: He is not in acute distress  Appearance: He is well-developed  He is not diaphoretic  HENT:      Head: Normocephalic and atraumatic  Nose: Nose normal    Eyes:      Conjunctiva/sclera: Conjunctivae normal    Cardiovascular:      Rate and Rhythm: Normal rate and regular rhythm  Heart sounds: Normal heart sounds  Pulmonary:      Effort: Pulmonary effort is normal  No respiratory distress  Breath sounds: Normal breath sounds  Abdominal:      Palpations: Abdomen is soft  Tenderness: There is abdominal tenderness in the epigastric area  Musculoskeletal:         General: Normal range of motion  Cervical back: Normal range of motion and neck supple  Skin:     General: Skin is warm and dry  Neurological:      General: No focal deficit present  Mental Status: He is alert and oriented to person, place, and time  Mental status is at baseline  Cranial Nerves: No cranial nerve deficit  Motor: No weakness     Psychiatric:         Mood and Affect: Mood normal          Vital Signs  ED Triage Vitals [09/12/22 1737]   Temperature Pulse Respirations Blood Pressure SpO2   98 °F (36 7 °C) 67 14 148/77 98 %      Temp Source Heart Rate Source Patient Position - Orthostatic VS BP Location FiO2 (%)   Oral Monitor Lying Right arm --      Pain Score       7           Vitals:    09/12/22 1737   BP: 148/77   Pulse: 67   Patient Position - Orthostatic VS: Lying         Visual Acuity      ED Medications  Medications   sodium chloride 0 9 % bolus 1,000 mL (0 mL Intravenous Stopped 9/12/22 1938)   morphine injection 4 mg (4 mg Intravenous Given 9/12/22 1836)   ondansetron (ZOFRAN) injection 4 mg (4 mg Intravenous Given 9/12/22 1836)   aluminum-magnesium hydroxide-simethicone (MYLANTA) oral suspension 30 mL (30 mL Oral Given 9/12/22 2018)   sucralfate (CARAFATE) tablet 1 g (1 g Oral Given 9/12/22 2018)       Diagnostic Studies  Results Reviewed     Procedure Component Value Units Date/Time    Comprehensive metabolic panel [[id number]]  (Abnormal) Collected: 09/12/22 1754    Lab Status: Final result Specimen: Blood from Arm, Left Updated: 09/12/22 1820     Sodium 136 mmol/L      Potassium 3 8 mmol/L      Chloride 101 mmol/L      CO2 20 mmol/L      ANION GAP 15 mmol/L      BUN 11 mg/dL      Creatinine 0 70 mg/dL      Glucose 124 mg/dL      Calcium 8 6 mg/dL      Corrected Calcium 9 1 mg/dL      AST 13 U/L      ALT 21 U/L      Alkaline Phosphatase 86 U/L      Total Protein 7 8 g/dL      Albumin 3 4 g/dL      Total Bilirubin 0 78 mg/dL      eGFR 105 ml/min/1 73sq m     Narrative:      [place] guidelines for Chronic Kidney Disease (CKD):     Stage 1 with normal or high GFR (GFR > 90 mL/min/1 73 square meters)    Stage 2 Mild CKD (GFR = 60-89 mL/min/1 73 square meters)    Stage 3A Moderate CKD (GFR = 45-59 mL/min/1 73 square meters)    Stage 3B Moderate CKD (GFR = 30-44 mL/min/1 73 square meters)    Stage 4 Severe CKD (GFR = 15-29 mL/min/1 73 square meters)    Stage 5 End Stage CKD (GFR <15 mL/min/1 73 square meters)  Note: GFR calculation is accurate only with a steady state creatinine    Lipase [[id number]]  (Normal) Collected: 09/12/22 1754    Lab Status: Final result Specimen: Blood from Arm, Left Updated: 09/12/22 1820     Lipase 117 u/L     CBC and differential [[id number]] Collected: 09/12/22 1754    Lab Status: Final result Specimen: Blood from Arm, Left Updated: 09/12/22 1800     WBC 8 26 Thousand/uL      RBC 4 97 Million/uL      Hemoglobin 14 5 g/dL      Hematocrit 43 8 %      MCV 88 fL      MCH 29 2 pg      MCHC 33 1 g/dL      RDW 13 5 %      MPV 9 3 fL      Platelets 731 Thousands/uL      nRBC 0 /100 WBCs      Neutrophils Relative 48 %      Immat GRANS % 0 %      Lymphocytes Relative 43 %      Monocytes Relative 7 %      Eosinophils Relative 2 %      Basophils Relative 0 %      Neutrophils Absolute 3 91 Thousands/µL      Immature Grans Absolute 0 01 Thousand/uL      Lymphocytes Absolute 3 57 Thousands/µL      Monocytes Absolute 0 56 Thousand/µL      Eosinophils Absolute 0 19 Thousand/µL      Basophils Absolute 0 02 Thousands/µL     UA w Reflex to Microscopic w Reflex to Culture [[id number]]     Lab Status: No result Specimen: Urine                  CT abdomen pelvis wo contrast   Final Result by Bella Gonzalez MD (09/12 1948)      Findings suggestive of mild pancreatitis in the head and uncinate process              Workstation performed: [id number]                    Procedures  Procedures         ED Course                               SBIRT 22yo+    Flowsheet Row Most Recent Value   SBIRT (25 yo +)    In order to provide better care to our patients, we are screening all of our patients for alcohol and drug use  Would it be okay to ask you these screening questions? Yes Filed at: 09/12/2022 1739   Initial Alcohol Screen: US AUDIT-C     1  How often do you have a drink containing alcohol? 0 Filed at: 09/12/2022 1739   2  How many drinks containing alcohol do you have on a typical day you are drinking? 0 Filed at: 09/12/2022 1739   3a  Male UNDER 65: How often do you have five or more drinks on one occasion? 0 Filed at: 09/12/2022 1739   Audit-C Score 0 Filed at: 09/12/2022 1739   MARCUS: How many times in the past year have you    Used an illegal drug or used a prescription medication for non-medical reasons? Never Filed at: 09/12/2022 1739                    MDM  Number of Diagnoses or Management Options  Pancreatitis: new and requires workup     Amount and/or Complexity of Data Reviewed  Clinical lab tests: ordered and reviewed  Tests in the radiology section of CPT®: ordered and reviewed    Risk of Complications, Morbidity, and/or Mortality  Presenting problems: high  Management options: high    Patient Progress  Patient progress: improved (Patient's pain improved  Labs are within normal limits  His CT scan shows a potential mild pancreatitis  Patient has had this in the past   He has had symptoms for few weeks  He has no vomiting or diarrhea  Patient feels well enough to go home  Discussed with him avoiding alcohol, smoking, any fatty or acidic or spicy foods and doing clears and a bland diet along with medications for his stomach which she has not been taking recently  Will represcribe him his PPI and nausea medicines and Carafate    Discussed with him worsening signs and symptoms to return to the emergency department for )      Disposition  Final diagnoses:   Pancreatitis     Time reflects when diagnosis was documented in both MDM as applicable and the Disposition within this note     Time User Action Codes Description Comment    9/12/2022  8:11 PM Sanjiv Rai, [address] [K85 90] Pancreatitis     9/12/2022  8:15 PM Conchita Class Add [K20 90] Esophagitis       ED Disposition     ED Disposition   Discharge    Condition   Stable    Date/Time   Mon Sep 12, 2022  8:11 PM    Comment   Claudio Nuñez discharge to home/self care  Follow-up Information     Follow up With Specialties Details Why Contact Info Additional [address]  [employer] [facility] Go to  If symptoms worsen [address] [facility] Call in 1 day For follow-up appointment  [address] [facility], [place], [place], [place], [address] [facility]           Patient's Medications   Discharge Prescriptions    SUCRALFATE (CARAFATE) 1 G/10 ML SUSPENSION    Take 10 mL (1 g total) by mouth 4 (four) times a day for 10 days       Start Date: 9/12/2022 End Date: 9/22/2022       Order Dose: 1 g       Quantity: 400 mL    Refills: 0       No discharge procedures on file      PDMP Review     None          ED Provider  Electronically Signed by           Brittany Roberts MD  09/12/22 2050

## 2022-09-22 ENCOUNTER — HOSPITAL ENCOUNTER (EMERGENCY)
Facility: HOSPITAL | Age: 57
Discharge: HOME | End: 2022-09-23
Attending: EMERGENCY MEDICINE
Payer: COMMERCIAL

## 2022-09-22 ENCOUNTER — APPOINTMENT (EMERGENCY)
Dept: CT IMAGING | Facility: HOSPITAL | Age: 57
End: 2022-09-22
Payer: COMMERCIAL

## 2022-09-22 VITALS
WEIGHT: 172 LBS | BODY MASS INDEX: 24.08 KG/M2 | TEMPERATURE: 98.5 F | SYSTOLIC BLOOD PRESSURE: 169 MMHG | HEIGHT: 71 IN | HEART RATE: 72 BPM | DIASTOLIC BLOOD PRESSURE: 90 MMHG | RESPIRATION RATE: 16 BRPM | OXYGEN SATURATION: 98 %

## 2022-09-22 DIAGNOSIS — R10.33 PERIUMBILICAL ABDOMINAL PAIN: Primary | ICD-10-CM

## 2022-09-22 LAB
ALBUMIN SERPL BCP-MCNC: 3.4 G/DL (ref 3.5–5)
ALP SERPL-CCNC: 89 U/L (ref 46–116)
ALT SERPL W P-5'-P-CCNC: 21 U/L (ref 12–78)
ANION GAP SERPL CALCULATED.3IONS-SCNC: 7 MMOL/L (ref 4–13)
AST SERPL W P-5'-P-CCNC: 12 U/L (ref 5–45)
BASOPHILS # BLD AUTO: 0.02 THOUSANDS/ΜL (ref 0–0.1)
BASOPHILS NFR BLD AUTO: 0 % (ref 0–1)
BILIRUB SERPL-MCNC: 0.42 MG/DL (ref 0.2–1)
BUN SERPL-MCNC: 15 MG/DL (ref 5–25)
CALCIUM ALBUM COR SERPL-MCNC: 9.3 MG/DL (ref 8.3–10.1)
CALCIUM SERPL-MCNC: 8.8 MG/DL (ref 8.3–10.1)
CHLORIDE SERPL-SCNC: 103 MMOL/L (ref 96–108)
CO2 SERPL-SCNC: 26 MMOL/L (ref 21–32)
CREAT SERPL-MCNC: 1.21 MG/DL (ref 0.6–1.3)
EOSINOPHIL # BLD AUTO: 0.28 THOUSAND/ΜL (ref 0–0.61)
EOSINOPHIL NFR BLD AUTO: 3 % (ref 0–6)
ERYTHROCYTE [DISTWIDTH] IN BLOOD BY AUTOMATED COUNT: 13.6 % (ref 11.6–15.1)
GFR SERPL CREATININE-BSD FRML MDRD: 66 ML/MIN/1.73SQ M
GLUCOSE SERPL-MCNC: 188 MG/DL (ref 65–140)
HCT VFR BLD AUTO: 42.5 % (ref 36.5–49.3)
HGB BLD-MCNC: 14.1 G/DL (ref 12–17)
IMM GRANULOCYTES # BLD AUTO: 0.01 THOUSAND/UL (ref 0–0.2)
IMM GRANULOCYTES NFR BLD AUTO: 0 % (ref 0–2)
LIPASE SERPL-CCNC: 213 U/L (ref 73–393)
LYMPHOCYTES # BLD AUTO: 3.19 THOUSANDS/ΜL (ref 0.6–4.47)
LYMPHOCYTES NFR BLD AUTO: 39 % (ref 14–44)
MCH RBC QN AUTO: 29 PG (ref 26.8–34.3)
MCHC RBC AUTO-ENTMCNC: 33.2 G/DL (ref 31.4–37.4)
MCV RBC AUTO: 87 FL (ref 82–98)
MONOCYTES # BLD AUTO: 0.56 THOUSAND/ΜL (ref 0.17–1.22)
MONOCYTES NFR BLD AUTO: 7 % (ref 4–12)
NEUTROPHILS # BLD AUTO: 4.16 THOUSANDS/ΜL (ref 1.85–7.62)
NEUTS SEG NFR BLD AUTO: 51 % (ref 43–75)
NRBC BLD AUTO-RTO: 0 /100 WBCS
PLATELET # BLD AUTO: 282 THOUSANDS/UL (ref 149–390)
PMV BLD AUTO: 9.5 FL (ref 8.9–12.7)
POTASSIUM SERPL-SCNC: 3.7 MMOL/L (ref 3.5–5.3)
PROT SERPL-MCNC: 7.8 G/DL (ref 6.4–8.4)
RBC # BLD AUTO: 4.86 MILLION/UL (ref 3.88–5.62)
SODIUM SERPL-SCNC: 136 MMOL/L (ref 135–147)
WBC # BLD AUTO: 8.22 THOUSAND/UL (ref 4.31–10.16)

## 2022-09-22 PROCEDURE — 99285 EMERGENCY DEPT VISIT HI MDM: CPT | Performed by: SURGERY

## 2022-09-22 PROCEDURE — 74177 CT ABD & PELVIS W/CONTRAST: CPT

## 2022-09-22 PROCEDURE — 96361 HYDRATE IV INFUSION ADD-ON: CPT

## 2022-09-22 PROCEDURE — 85025 COMPLETE CBC W/AUTO DIFF WBC: CPT | Performed by: EMERGENCY MEDICINE

## 2022-09-22 PROCEDURE — 80053 COMPREHEN METABOLIC PANEL: CPT | Performed by: EMERGENCY MEDICINE

## 2022-09-22 PROCEDURE — 83690 ASSAY OF LIPASE: CPT | Performed by: EMERGENCY MEDICINE

## 2022-09-22 PROCEDURE — 36415 COLL VENOUS BLD VENIPUNCTURE: CPT

## 2022-09-22 PROCEDURE — 96375 TX/PRO/DX INJ NEW DRUG ADDON: CPT

## 2022-09-22 PROCEDURE — 99284 EMERGENCY DEPT VISIT MOD MDM: CPT

## 2022-09-22 PROCEDURE — 96374 THER/PROPH/DIAG INJ IV PUSH: CPT

## 2022-09-22 RX ORDER — SUCRALFATE 1 G/1
1 TABLET ORAL ONCE
Status: COMPLETED | OUTPATIENT
Start: 2022-09-22 | End: 2022-09-22

## 2022-09-22 RX ORDER — ONDANSETRON 2 MG/ML
4 INJECTION INTRAMUSCULAR; INTRAVENOUS ONCE
Status: COMPLETED | OUTPATIENT
Start: 2022-09-22 | End: 2022-09-22

## 2022-09-22 RX ORDER — DICYCLOMINE HCL 20 MG
20 TABLET ORAL 2 TIMES DAILY
Qty: 14 TABLET | Refills: 0 | Status: SHIPPED | OUTPATIENT
Start: 2022-09-22 | End: 2022-09-29

## 2022-09-22 RX ORDER — FAMOTIDINE 20 MG/1
20 TABLET, FILM COATED ORAL ONCE
Status: COMPLETED | OUTPATIENT
Start: 2022-09-22 | End: 2022-09-22

## 2022-09-22 RX ADMIN — SODIUM CHLORIDE 1000 ML: 0.9 INJECTION, SOLUTION INTRAVENOUS at 21:18

## 2022-09-22 RX ADMIN — FAMOTIDINE 20 MG: 20 TABLET ORAL at 21:17

## 2022-09-22 RX ADMIN — ONDANSETRON 4 MG: 2 INJECTION INTRAMUSCULAR; INTRAVENOUS at 21:17

## 2022-09-22 RX ADMIN — MORPHINE SULFATE 2 MG: 2 INJECTION, SOLUTION INTRAMUSCULAR; INTRAVENOUS at 21:17

## 2022-09-22 RX ADMIN — SUCRALFATE 1 G: 1 TABLET ORAL at 21:17

## 2022-09-22 RX ADMIN — IOHEXOL 100 ML: 350 INJECTION, SOLUTION INTRAVENOUS at 21:51

## 2022-09-23 NOTE — ED PROVIDER NOTES
History  Chief Complaint   Patient presents with    Abdominal Pain     Pt arrives via EMS for periumbilical pain worsening x5 weeks  Pt reports associated excess belching      Bong Mccarty is a 65 y o  male recently evaluated here in ED earlier last week for abdominal pain and was diagnosed with pancreatitis  Patient reports that his symptoms have been waxing and waning and have worsened since initial evaluation  Reports that he had one normal bowel movement this morning  Has not taken anything for his symptoms at this time  Denies any chest pain, shortness of breath, lightheadedness, dizziness,  vomiting, diarrhea, fevers, chills, numbness, tingling, weakness in the extremities  No hematochezia, melena, coffee ground emesis  No further complaints at this time            Prior to Admission Medications   Prescriptions Last Dose Informant Patient Reported? Taking?    Insulin Pen Needle (PEN NEEDLES 29GX1/2") 29G X 12MM MISC  Self Yes No   Si Device by Does not apply route   Insulin Syringe-Needle U-100 31G X 1/4" 0 5 ML MISC  Self Yes No   Si Syringe by Does not apply route   Lancet Devices (LANCING DEVICE) MISC  Self Yes No   Sig: Inject 1 Device under the skin   albuterol (PROVENTIL HFA,VENTOLIN HFA) 90 mcg/act inhaler  Self No No   Sig: Inhale 2 puffs every 4 (four) hours as needed for wheezing   atorvastatin (LIPITOR) 40 mg tablet  Self Yes No   Sig: TAKE 1 TABLET BY MOUTH EVERY DAY AT NIGHT   budesonide (PULMICORT) 0 25 mg/2 mL nebulizer solution  Self Yes No   Sig: Inhale 0 25 mg   chlorhexidine (PERIDEX) 0 12 % solution   No No   Sig: Apply 15 mL to the mouth or throat 2 (two) times a day Swish for one minute and spit   fenofibrate (TRIGLIDE) 160 MG tablet   Yes No   Sig: Take 160 mg by mouth daily   fluticasone-salmeterol (Advair HFA) 115-21 MCG/ACT inhaler   Yes No   Sig: Inhale 2 puffs 2 (two) times a day   fluticasone-vilanterol (BREO ELLIPTA) 200-25 MCG/INH inhaler  Self Yes No   Sig: Inhale 1 puff   gabapentin (NEURONTIN) 300 mg capsule   Yes No   Sig: Take 300 mg by mouth Three times a day   insulin lispro protamine-insulin lispro (HumaLOG 75/25) 100 units/mL  Self Yes No   Sig: Inject 15 Units under the skin    lisinopril (ZESTRIL) 20 mg tablet  Self Yes No   Sig: Take 20 mg by mouth daily   lisinopril-hydrochlorothiazide (PRINZIDE,ZESTORETIC) 20-25 MG per tablet  Self Yes No   Sig: Take 1 tablet by mouth   meloxicam (MOBIC) 15 mg tablet   Yes No   Sig: Take 15 mg by mouth daily   metFORMIN (GLUCOPHAGE) 1000 MG tablet  Self Yes No   Sig: Take 1,000 mg by mouth 2 (two) times a day with meals     methocarbamol (ROBAXIN) 500 mg tablet   No No   Sig: Take 1 tablet (500 mg total) by mouth 4 (four) times a day   Patient not taking: Reported on 4/24/2021   naproxen (NAPROSYN) 500 mg tablet  Self Yes No   Sig: Take 1 tablet by mouth every 12 (twelve) hours   naproxen (NAPROSYN) 500 mg tablet   No No   Sig: Take 1 tablet (500 mg total) by mouth 2 (two) times a day with meals As needed for mild pain   nicotine (NICODERM CQ) 21 mg/24 hr TD 24 hr patch  Self No No   Sig: Place 1 patch on the skin daily   omeprazole (PriLOSEC) 20 mg delayed release capsule   No No   Sig: Take 1 capsule (20 mg total) by mouth daily   ondansetron (ZOFRAN-ODT) 4 mg disintegrating tablet   No No   Sig: Take 1 tablet (4 mg total) by mouth every 6 (six) hours as needed for nausea or vomiting   pantoprazole (PROTONIX) 20 mg tablet   No No   Sig: Take 1 tablet (20 mg total) by mouth daily   sucralfate (CARAFATE) 1 g/10 mL suspension   No No   Sig: Take 10 mL (1 g total) by mouth 4 (four) times a day for 10 days      Facility-Administered Medications: None       Past Medical History:   Diagnosis Date    Asthma     Cardiac murmur     COPD (chronic obstructive pulmonary disease) (HCC)     Diabetes mellitus (HCC)     Hyperlipidemia     Hypertension        Past Surgical History:   Procedure Laterality Date    APPENDECTOMY      teenager Family History   Problem Relation Age of Onset    Hypertension Mother     Hypertension Father      I have reviewed and agree with the history as documented  E-Cigarette/Vaping    E-Cigarette Use Never User      E-Cigarette/Vaping Substances    Nicotine No     THC No     CBD No     Flavoring No     Other No     Unknown No      Social History     Tobacco Use    Smoking status: Current Some Day Smoker     Packs/day: 0 25     Years: 25 00     Pack years: 6 25     Types: Cigarettes    Smokeless tobacco: Never Used   Vaping Use    Vaping Use: Never used   Substance Use Topics    Alcohol use: Yes     Comment: occassional    Drug use: No       Review of Systems   Constitutional: Negative for chills and fever  HENT: Negative for ear pain and sore throat  Eyes: Negative for pain and visual disturbance  Respiratory: Negative for cough and shortness of breath  Cardiovascular: Negative for chest pain and palpitations  Gastrointestinal: Positive for abdominal pain  Negative for vomiting  Genitourinary: Negative for dysuria and hematuria  Musculoskeletal: Negative for arthralgias and back pain  Skin: Negative for color change and rash  Neurological: Negative for seizures and syncope  All other systems reviewed and are negative  Physical Exam  Physical Exam  Vitals and nursing note reviewed  Constitutional:       General: He is not in acute distress  Appearance: He is well-developed  He is not ill-appearing  HENT:      Head: Normocephalic and atraumatic  Mouth/Throat:      Mouth: Mucous membranes are moist       Pharynx: Oropharynx is clear  Eyes:      Extraocular Movements: Extraocular movements intact  Conjunctiva/sclera: Conjunctivae normal       Pupils: Pupils are equal, round, and reactive to light  Cardiovascular:      Rate and Rhythm: Normal rate and regular rhythm  Heart sounds: Normal heart sounds  No murmur heard    Pulmonary:      Effort: Pulmonary effort is normal  No respiratory distress  Breath sounds: Normal breath sounds  Abdominal:      General: Abdomen is flat and protuberant  Bowel sounds are normal  There is no distension  Palpations: Abdomen is soft  Tenderness: There is abdominal tenderness in the periumbilical area  There is no right CVA tenderness or left CVA tenderness  Hernia: No hernia is present  Musculoskeletal:      Cervical back: Neck supple  Skin:     General: Skin is warm and dry  Capillary Refill: Capillary refill takes less than 2 seconds  Neurological:      General: No focal deficit present  Mental Status: He is alert and oriented to person, place, and time           Vital Signs  ED Triage Vitals [09/22/22 2016]   Temperature Pulse Respirations Blood Pressure SpO2   98 5 °F (36 9 °C) 75 18 145/83 97 %      Temp Source Heart Rate Source Patient Position - Orthostatic VS BP Location FiO2 (%)   Oral Monitor Lying Right arm --      Pain Score       7           Vitals:    09/22/22 2016 09/22/22 2230   BP: 145/83 169/90   Pulse: 75 72   Patient Position - Orthostatic VS: Lying Lying         Visual Acuity      ED Medications  Medications   sucralfate (CARAFATE) tablet 1 g (1 g Oral Given 9/22/22 2117)   famotidine (PEPCID) tablet 20 mg (20 mg Oral Given 9/22/22 2117)   sodium chloride 0 9 % bolus 1,000 mL (0 mL Intravenous Stopped 9/22/22 2257)   morphine injection 2 mg (2 mg Intravenous Given 9/22/22 2117)   ondansetron (ZOFRAN) injection 4 mg (4 mg Intravenous Given 9/22/22 2117)   iohexol (OMNIPAQUE) 350 MG/ML injection (SINGLE-DOSE) 100 mL (100 mL Intravenous Given 9/22/22 2151)       Diagnostic Studies  Results Reviewed     Procedure Component Value Units Date/Time    Comprehensive metabolic panel [[id number]]  (Abnormal) Collected: 09/22/22 2021    Lab Status: Final result Specimen: Blood from Arm, Left Updated: 09/22/22 2048     Sodium 136 mmol/L      Potassium 3 7 mmol/L      Chloride 103 mmol/L      CO2 26 mmol/L      ANION GAP 7 mmol/L      BUN 15 mg/dL      Creatinine 1 21 mg/dL      Glucose 188 mg/dL      Calcium 8 8 mg/dL      Corrected Calcium 9 3 mg/dL      AST 12 U/L      ALT 21 U/L      Alkaline Phosphatase 89 U/L      Total Protein 7 8 g/dL      Albumin 3 4 g/dL      Total Bilirubin 0 42 mg/dL      eGFR 66 ml/min/1 73sq m     Narrative:      National Kidney Disease Foundation guidelines for Chronic Kidney Disease (CKD):     Stage 1 with normal or high GFR (GFR > 90 mL/min/1 73 square meters)    Stage 2 Mild CKD (GFR = 60-89 mL/min/1 73 square meters)    Stage 3A Moderate CKD (GFR = 45-59 mL/min/1 73 square meters)    Stage 3B Moderate CKD (GFR = 30-44 mL/min/1 73 square meters)    Stage 4 Severe CKD (GFR = 15-29 mL/min/1 73 square meters)    Stage 5 End Stage CKD (GFR <15 mL/min/1 73 square meters)  Note: GFR calculation is accurate only with a steady state creatinine    Lipase [[id number]]  (Normal) Collected: 09/22/22 2021    Lab Status: Final result Specimen: Blood from Arm, Left Updated: 09/22/22 2048     Lipase 213 u/L     CBC and differential [[id number]] Collected: 09/22/22 2021    Lab Status: Final result Specimen: Blood from Arm, Left Updated: 09/22/22 2026     WBC 8 22 Thousand/uL      RBC 4 86 Million/uL      Hemoglobin 14 1 g/dL      Hematocrit 42 5 %      MCV 87 fL      MCH 29 0 pg      MCHC 33 2 g/dL      RDW 13 6 %      MPV 9 5 fL      Platelets 431 Thousands/uL      nRBC 0 /100 WBCs      Neutrophils Relative 51 %      Immat GRANS % 0 %      Lymphocytes Relative 39 %      Monocytes Relative 7 %      Eosinophils Relative 3 %      Basophils Relative 0 %      Neutrophils Absolute 4 16 Thousands/µL      Immature Grans Absolute 0 01 Thousand/uL      Lymphocytes Absolute 3 19 Thousands/µL      Monocytes Absolute 0 56 Thousand/µL      Eosinophils Absolute 0 28 Thousand/µL      Basophils Absolute 0 02 Thousands/µL                  CT abdomen pelvis with contrast   Final Result by Kelly Villagomez MD (09/22 2331)      No evidence of acute inflammatory process in the abdomen or pelvis  Workstation performed: [id number]                    Procedures  Procedures         ED Course                               SBIRT 20yo+    Flowsheet Row Most Recent Value   SBIRT (25 yo +)    In order to provide better care to our patients, we are screening all of our patients for alcohol and drug use  Would it be okay to ask you these screening questions? Yes Filed at: 09/22/2022 2018   Initial Alcohol Screen: US AUDIT-C     1  How often do you have a drink containing alcohol? 0 Filed at: 09/22/2022 2018   2  How many drinks containing alcohol do you have on a typical day you are drinking? 0 Filed at: 09/22/2022 2018   3a  Male UNDER 65: How often do you have five or more drinks on one occasion? 0 Filed at: 09/22/2022 2018   Audit-C Score 0 Filed at: 09/22/2022 2018   MARCUS: How many times in the past year have you    Used an illegal drug or used a prescription medication for non-medical reasons? Never Filed at: 09/22/2022 2018                    MDM  Number of Diagnoses or Management Options  Periumbilical abdominal pain: minor  Diagnosis management comments: The patient was advised to return to the ED if they begin to experience any worsening symptoms, chest pain, shortness of breath, lightheadedness, dizziness, changes to vision, syncopal episodes, numbness, tingling, or weakness in the extremities, difficulty walking/swallowing/breathing  They demonstrated understanding  Recommended follow up with GI          Amount and/or Complexity of Data Reviewed  Clinical lab tests: ordered and reviewed  Tests in the radiology section of CPT®: ordered and reviewed  Tests in the medicine section of CPT®: ordered and reviewed  Review and summarize past medical records: yes  Independent visualization of images, tracings, or specimens: yes    Risk of Complications, Morbidity, and/or Mortality  Presenting problems: moderate  Diagnostic procedures: moderate  Management options: moderate    Patient Progress  Patient progress: stable      Disposition  Final diagnoses:   Periumbilical abdominal pain     Time reflects when diagnosis was documented in both MDM as applicable and the Disposition within this note     Time User Action Codes Description Comment    9/22/2022 11:51 PM Sterling Forestjessica Hagan Add [D88 79] Periumbilical abdominal pain       ED Disposition     ED Disposition   Discharge    Condition   Stable    Date/Time   Thu Sep 22, 2022 11:48 PM    Comment   Mele Henderson discharge to home/self care                 Follow-up Information     Follow up With Specialties Details Why Contact Info Additional [address] [facility] Emergency Department Emergency Medicine Go to  If symptoms worsen [address] [facility], [address] [place], [place], [address]  [facility] Call today To schedule an appointment for follow-up within the [address] [place] [phone]  Rico Delarosa [facility], [address] [facility], [address] [place], [place], [address] [facility]           Discharge Medication List as of 9/22/2022 11:54 PM      START taking these medications    Details   dicyclomine (BENTYL) 20 mg tablet Take 1 tablet (20 mg total) by mouth 2 (two) times a day for 7 days, Starting Thu 9/22/2022, Until Thu 9/29/2022, Print         CONTINUE these medications which have NOT CHANGED    Details   albuterol (PROVENTIL HFA,VENTOLIN HFA) 90 mcg/act inhaler Inhale 2 puffs every 4 (four) hours as needed for wheezing, Starting Mon 9/17/2018, Print      atorvastatin (LIPITOR) 40 mg tablet TAKE 1 TABLET BY MOUTH EVERY DAY AT NIGHT, Historical Med budesonide (PULMICORT) 0 25 mg/2 mL nebulizer solution Inhale 0 25 mg, Starting Mon 3/4/2019, Until Sat 4/24/2021, Historical Med      chlorhexidine (PERIDEX) 0 12 % solution Apply 15 mL to the mouth or throat 2 (two) times a day Swish for one minute and spit, Starting Sat 6/19/2021, Normal      fenofibrate (TRIGLIDE) 160 MG tablet Take 160 mg by mouth daily, Starting Mon 3/22/2021, Historical Med      fluticasone-salmeterol (Advair HFA) 115-21 MCG/ACT inhaler Inhale 2 puffs 2 (two) times a day, Starting Tue 2/25/2020, Until Sat 4/24/2021, Historical Med      fluticasone-vilanterol (BREO ELLIPTA) 200-25 MCG/INH inhaler Inhale 1 puff, Starting Tue 12/11/2018, Historical Med      gabapentin (NEURONTIN) 300 mg capsule Take 300 mg by mouth Three times a day, Historical Med      insulin lispro protamine-insulin lispro (HumaLOG 75/25) 100 units/mL Inject 15 Units under the skin , Starting Fri 9/28/2018, Until Sat 4/24/2021, Historical Med      Insulin Pen Needle (PEN NEEDLES 29GX1/2") 29G X 12MM MISC 1 Device by Does not apply route, Starting Thu 11/30/2017, Historical Med      Insulin Syringe-Needle U-100 31G X 1/4" 0 5 ML MISC 1 Syringe by Does not apply route, Starting Mon 10/8/2018, Historical Med      Lancet Devices (LANCING DEVICE) MISC Inject 1 Device under the skin, Starting Mon 3/5/2018, Historical Med      lisinopril (ZESTRIL) 20 mg tablet Take 20 mg by mouth daily, Historical Med      lisinopril-hydrochlorothiazide (PRINZIDE,ZESTORETIC) 20-25 MG per tablet Take 1 tablet by mouth, Starting Mon 3/5/2018, Historical Med      meloxicam (MOBIC) 15 mg tablet Take 15 mg by mouth daily, Starting Wed 4/14/2021, Historical Med      metFORMIN (GLUCOPHAGE) 1000 MG tablet Take 1,000 mg by mouth 2 (two) times a day with meals  , Historical Med      methocarbamol (ROBAXIN) 500 mg tablet Take 1 tablet (500 mg total) by mouth 4 (four) times a day, Starting Mon 3/2/2020, Normal      !! naproxen (NAPROSYN) 500 mg tablet Take 1 tablet by mouth every 12 (twelve) hours, Starting Wed 10/5/2016, Historical Med      !! naproxen (NAPROSYN) 500 mg tablet Take 1 tablet (500 mg total) by mouth 2 (two) times a day with meals As needed for mild pain, Starting Sat 6/19/2021, Normal      nicotine (NICODERM CQ) 21 mg/24 hr TD 24 hr patch Place 1 patch on the skin daily, Starting Tue 12/12/2017, Print      omeprazole (PriLOSEC) 20 mg delayed release capsule Take 1 capsule (20 mg total) by mouth daily, Starting Mon 9/12/2022, Normal      ondansetron (ZOFRAN-ODT) 4 mg disintegrating tablet Take 1 tablet (4 mg total) by mouth every 6 (six) hours as needed for nausea or vomiting, Starting Mon 9/12/2022, Normal      pantoprazole (PROTONIX) 20 mg tablet Take 1 tablet (20 mg total) by mouth daily, Starting Mon 11/29/2021, Print      sucralfate (CARAFATE) 1 g/10 mL suspension Take 10 mL (1 g total) by mouth 4 (four) times a day for 10 days, Starting Mon 9/12/2022, Until Thu 9/22/2022, Normal       !! - Potential duplicate medications found  Please discuss with provider                PDMP Review       Value Time User    PDMP Reviewed  Yes 9/22/2022  9:10 PM Marilyn Siddiqui PA-C          ED Provider  Electronically Signed by           Marilyn Siddiqui PA-C  09/23/22 0321

## 2023-02-23 ENCOUNTER — TELEPHONE (OUTPATIENT)
Dept: GASTROENTEROLOGY | Facility: CLINIC | Age: 58
End: 2023-02-23

## 2023-05-26 ENCOUNTER — HOSPITAL ENCOUNTER (EMERGENCY)
Facility: HOSPITAL | Age: 58
Discharge: HOME | End: 2023-05-27
Attending: EMERGENCY MEDICINE

## 2023-05-26 ENCOUNTER — APPOINTMENT (EMERGENCY)
Dept: CT IMAGING | Facility: HOSPITAL | Age: 58
End: 2023-05-26

## 2023-05-26 ENCOUNTER — APPOINTMENT (EMERGENCY)
Dept: RADIOLOGY | Facility: HOSPITAL | Age: 58
End: 2023-05-26

## 2023-05-26 VITALS
HEART RATE: 73 BPM | OXYGEN SATURATION: 96 % | RESPIRATION RATE: 22 BRPM | BODY MASS INDEX: 27.61 KG/M2 | TEMPERATURE: 98.4 F | DIASTOLIC BLOOD PRESSURE: 80 MMHG | WEIGHT: 197.97 LBS | SYSTOLIC BLOOD PRESSURE: 161 MMHG

## 2023-05-26 DIAGNOSIS — R07.9 CHEST PAIN: Primary | ICD-10-CM

## 2023-05-26 LAB
ALBUMIN SERPL BCP-MCNC: 3.9 G/DL (ref 3.5–5)
ALP SERPL-CCNC: 81 U/L (ref 34–104)
ALT SERPL W P-5'-P-CCNC: 16 U/L (ref 7–52)
ANION GAP SERPL CALCULATED.3IONS-SCNC: 6 MMOL/L (ref 4–13)
AST SERPL W P-5'-P-CCNC: 16 U/L (ref 13–39)
BASOPHILS # BLD AUTO: 0.02 THOUSANDS/ÂΜL (ref 0–0.1)
BASOPHILS NFR BLD AUTO: 0 % (ref 0–1)
BILIRUB SERPL-MCNC: 0.35 MG/DL (ref 0.2–1)
BUN SERPL-MCNC: 10 MG/DL (ref 5–25)
CALCIUM SERPL-MCNC: 9.3 MG/DL (ref 8.4–10.2)
CARDIAC TROPONIN I PNL SERPL HS: 13 NG/L
CHLORIDE SERPL-SCNC: 103 MMOL/L (ref 96–108)
CO2 SERPL-SCNC: 28 MMOL/L (ref 21–32)
CREAT SERPL-MCNC: 0.99 MG/DL (ref 0.6–1.3)
EOSINOPHIL # BLD AUTO: 0.18 THOUSAND/ÂΜL (ref 0–0.61)
EOSINOPHIL NFR BLD AUTO: 2 % (ref 0–6)
ERYTHROCYTE [DISTWIDTH] IN BLOOD BY AUTOMATED COUNT: 13.9 % (ref 11.6–15.1)
GFR SERPL CREATININE-BSD FRML MDRD: 84 ML/MIN/1.73SQ M
GLUCOSE SERPL-MCNC: 154 MG/DL (ref 65–140)
HCT VFR BLD AUTO: 40.8 % (ref 36.5–49.3)
HGB BLD-MCNC: 13.4 G/DL (ref 12–17)
IMM GRANULOCYTES # BLD AUTO: 0.02 THOUSAND/UL (ref 0–0.2)
IMM GRANULOCYTES NFR BLD AUTO: 0 % (ref 0–2)
LYMPHOCYTES # BLD AUTO: 3.99 THOUSANDS/ÂΜL (ref 0.6–4.47)
LYMPHOCYTES NFR BLD AUTO: 39 % (ref 14–44)
MCH RBC QN AUTO: 28.7 PG (ref 26.8–34.3)
MCHC RBC AUTO-ENTMCNC: 32.8 G/DL (ref 31.4–37.4)
MCV RBC AUTO: 87 FL (ref 82–98)
MONOCYTES # BLD AUTO: 0.77 THOUSAND/ÂΜL (ref 0.17–1.22)
MONOCYTES NFR BLD AUTO: 8 % (ref 4–12)
NEUTROPHILS # BLD AUTO: 5.14 THOUSANDS/ÂΜL (ref 1.85–7.62)
NEUTS SEG NFR BLD AUTO: 51 % (ref 43–75)
NRBC BLD AUTO-RTO: 0 /100 WBCS
PLATELET # BLD AUTO: 327 THOUSANDS/UL (ref 149–390)
PMV BLD AUTO: 9.1 FL (ref 8.9–12.7)
POTASSIUM SERPL-SCNC: 4 MMOL/L (ref 3.5–5.3)
PROT SERPL-MCNC: 7.5 G/DL (ref 6.4–8.4)
RBC # BLD AUTO: 4.67 MILLION/UL (ref 3.88–5.62)
SODIUM SERPL-SCNC: 137 MMOL/L (ref 135–147)
WBC # BLD AUTO: 10.12 THOUSAND/UL (ref 4.31–10.16)

## 2023-05-26 RX ADMIN — IOHEXOL 100 ML: 350 INJECTION, SOLUTION INTRAVENOUS at 22:27

## 2023-05-27 LAB
2HR DELTA HS TROPONIN: 1 NG/L
CARDIAC TROPONIN I PNL SERPL HS: 14 NG/L

## 2023-05-27 NOTE — ED PROVIDER NOTES
"History  Chief Complaint   Patient presents with   • Chest Pain     Pt arrived via ems with c/o chest pain since 1700  Pt given 324 mg aspirin and nitro with no relief     65year old male pt comes to the ED with cc of intermittent chest pains    CXR is normal outside of a loop recorder which he has had for about two years  There were no associated sympotoms  He has no chest pains now    EKG showed no acute abnormalities  History provided by:  Patient   used: No    Chest Pain  Pain location:  Substernal area  Pain quality: aching    Pain radiates to:  Does not radiate  Pain severity:  Mild  Onset quality:  Gradual  Timing:  Constant  Progression:  Worsening  Chronicity:  New  Context: not breathing, no intercourse and no movement    Relieved by:  Nothing  Worsened by:  Nothing tried  Associated symptoms: no AICD problem, no anorexia, no fever and no heartburn        Prior to Admission Medications   Prescriptions Last Dose Informant Patient Reported? Taking?    Insulin Pen Needle (PEN NEEDLES 29GX1/2\") 29G X 12MM MISC  Self Yes No   Si Device by Does not apply route   Insulin Syringe-Needle U-100 31G X 1/4\" 0 5 ML MISC  Self Yes No   Si Syringe by Does not apply route   Lancet Devices (LANCING DEVICE) MISC  Self Yes No   Sig: Inject 1 Device under the skin   albuterol (PROVENTIL HFA,VENTOLIN HFA) 90 mcg/act inhaler  Self No No   Sig: Inhale 2 puffs every 4 (four) hours as needed for wheezing   atorvastatin (LIPITOR) 40 mg tablet  Self Yes No   Sig: TAKE 1 TABLET BY MOUTH EVERY DAY AT NIGHT   budesonide (PULMICORT) 0 25 mg/2 mL nebulizer solution  Self Yes No   Sig: Inhale 0 25 mg   chlorhexidine (PERIDEX) 0 12 % solution   No No   Sig: Apply 15 mL to the mouth or throat 2 (two) times a day Swish for one minute and spit   dicyclomine (BENTYL) 20 mg tablet   No No   Sig: Take 1 tablet (20 mg total) by mouth 2 (two) times a day for 7 days   ezetimibe (ZETIA) 10 mg tablet   Yes " No   Sig: Take 10 mg by mouth daily   fenofibrate (TRIGLIDE) 160 MG tablet   Yes No   Sig: Take 160 mg by mouth daily   fluticasone-salmeterol (Advair HFA) 115-21 MCG/ACT inhaler   Yes No   Sig: Inhale 2 puffs 2 (two) times a day   fluticasone-vilanterol (BREO ELLIPTA) 200-25 MCG/INH inhaler  Self Yes No   Sig: Inhale 1 puff   gabapentin (NEURONTIN) 300 mg capsule   Yes No   Sig: Take 300 mg by mouth Three times a day   insulin lispro protamine-insulin lispro (HumaLOG 75/25) 100 units/mL  Self Yes No   Sig: Inject 15 Units under the skin    lisinopril (ZESTRIL) 20 mg tablet  Self Yes No   Sig: Take 20 mg by mouth daily   lisinopril-hydrochlorothiazide (PRINZIDE,ZESTORETIC) 20-25 MG per tablet  Self Yes No   Sig: Take 1 tablet by mouth   meloxicam (MOBIC) 15 mg tablet   Yes No   Sig: Take 15 mg by mouth daily   metFORMIN (GLUCOPHAGE) 1000 MG tablet  Self Yes No   Sig: Take 1,000 mg by mouth 2 (two) times a day with meals     methocarbamol (ROBAXIN) 500 mg tablet   No No   Sig: Take 1 tablet (500 mg total) by mouth 4 (four) times a day   Patient not taking: Reported on 4/24/2021   naproxen (NAPROSYN) 500 mg tablet  Self Yes No   Sig: Take 1 tablet by mouth every 12 (twelve) hours   naproxen (NAPROSYN) 500 mg tablet   No No   Sig: Take 1 tablet (500 mg total) by mouth 2 (two) times a day with meals As needed for mild pain   nicotine (NICODERM CQ) 21 mg/24 hr TD 24 hr patch  Self No No   Sig: Place 1 patch on the skin daily   omeprazole (PriLOSEC) 20 mg delayed release capsule   No No   Sig: Take 1 capsule (20 mg total) by mouth daily   ondansetron (ZOFRAN-ODT) 4 mg disintegrating tablet   No No   Sig: Take 1 tablet (4 mg total) by mouth every 6 (six) hours as needed for nausea or vomiting   pantoprazole (PROTONIX) 20 mg tablet   No No   Sig: Take 1 tablet (20 mg total) by mouth daily   sildenafil (VIAGRA) 100 mg tablet   Yes No   Sig: Take 100 mg by mouth   sucralfate (CARAFATE) 1 g/10 mL suspension   No No   Sig: Take 10 mL (1 g total) by mouth 4 (four) times a day for 10 days   traMADol (ULTRAM) 50 mg tablet   Yes No   Sig: Take 50 mg by mouth every 8 (eight) hours as needed      Facility-Administered Medications: None       Past Medical History:   Diagnosis Date   • Asthma    • Cardiac murmur    • COPD (chronic obstructive pulmonary disease) ([facility])    • Diabetes mellitus ([facility] [address] )    • Hyperlipidemia    • Hypertension        Past Surgical History:   Procedure Laterality Date   • APPENDECTOMY      teenager        Family History   Problem Relation Age of Onset   • Hypertension Mother    • Hypertension Father      I have reviewed and agree with the history as documented  E-Cigarette/Vaping   • E-Cigarette Use Never User      E-Cigarette/Vaping Substances   • Nicotine No    • THC No    • CBD No    • Flavoring No    • Other No    • Unknown No      Social History     Tobacco Use   • Smoking status: Some Days     Packs/day: 0 25     Years: 25 00     Total pack years: 6 25     Types: Cigarettes   • Smokeless tobacco: Never   Vaping Use   • Vaping Use: Never used   Substance Use Topics   • Alcohol use: Yes     Comment: occassional   • Drug use: No       Review of Systems   Constitutional: Negative for fever  Cardiovascular: Positive for chest pain  Gastrointestinal: Negative for anorexia and heartburn  All other systems reviewed and are negative  Physical Exam  Physical Exam  Vitals and nursing note reviewed  Constitutional:       General: He is not in acute distress  Appearance: He is well-developed  He is not diaphoretic  HENT:      Head: Normocephalic and atraumatic  Right Ear: External ear normal       Left Ear: External ear normal    Eyes:      General: No scleral icterus  Right eye: No discharge  Left eye: No discharge  Conjunctiva/sclera: Conjunctivae normal    Neck:      Thyroid: No thyromegaly  Vascular: No JVD  Trachea: No tracheal deviation     Cardiovascular:      Rate and Rhythm: Normal rate and regular rhythm  Pulmonary:      Effort: Pulmonary effort is normal  No respiratory distress  Breath sounds: Normal breath sounds  No stridor  No wheezing or rales  Abdominal:      General: Bowel sounds are normal  There is no distension  Palpations: Abdomen is soft  Tenderness: There is no abdominal tenderness  Musculoskeletal:         General: No tenderness or deformity  Normal range of motion  Cervical back: Normal range of motion and neck supple  Skin:     General: Skin is warm and dry  Neurological:      Mental Status: He is alert and oriented to person, place, and time  Cranial Nerves: No cranial nerve deficit        Coordination: Coordination normal    Psychiatric:         Behavior: Behavior normal          Vital Signs  ED Triage Vitals [05/26/23 2111]   Temperature Pulse Respirations Blood Pressure SpO2   98 4 °F (36 9 °C) 67 19 161/80 97 %      Temp Source Heart Rate Source Patient Position - Orthostatic VS BP Location FiO2 (%)   Oral Monitor Lying Right arm --      Pain Score       --           Vitals:    05/26/23 2111 05/26/23 2115   BP: 161/80 161/80   Pulse: 67 73   Patient Position - Orthostatic VS: Lying Sitting         Visual Acuity      ED Medications  Medications   iohexol (OMNIPAQUE) 350 MG/ML injection (SINGLE-DOSE) 100 mL (100 mL Intravenous Given 5/26/23 2227)       Diagnostic Studies  Results Reviewed     Procedure Component Value Units Date/Time    HS Troponin I 2hr [[id number]]  (Normal) Collected: 05/26/23 2334    Lab Status: Final result Specimen: Blood from Arm, Right Updated: 05/27/23 0004     hs TnI 2hr 14 ng/L      Delta 2hr hsTnI 1 ng/L     HS Troponin 0hr (reflex protocol) [[id number]]  (Normal) Collected: 05/26/23 2125    Lab Status: Final result Specimen: Blood from Arm, Left Updated: 05/26/23 2208     hs TnI 0hr 13 ng/L     Comprehensive metabolic panel [[id number]]  (Abnormal) Collected: 05/26/23 2125    Lab Status: Final result Specimen: Blood from Arm, Left Updated: 05/26/23 2203     Sodium 137 mmol/L      Potassium 4 0 mmol/L      Chloride 103 mmol/L      CO2 28 mmol/L      ANION GAP 6 mmol/L      BUN 10 mg/dL      Creatinine 0 99 mg/dL      Glucose 154 mg/dL      Calcium 9 3 mg/dL      AST 16 U/L      ALT 16 U/L      Alkaline Phosphatase 81 U/L      Total Protein 7 5 g/dL      Albumin 3 9 g/dL      Total Bilirubin 0 35 mg/dL      eGFR 84 ml/min/1 73sq m     Narrative:      [place] guidelines for Chronic Kidney Disease (CKD):   •  Stage 1 with normal or high GFR (GFR > 90 mL/min/1 73 square meters)  •  Stage 2 Mild CKD (GFR = 60-89 mL/min/1 73 square meters)  •  Stage 3A Moderate CKD (GFR = 45-59 mL/min/1 73 square meters)  •  Stage 3B Moderate CKD (GFR = 30-44 mL/min/1 73 square meters)  •  Stage 4 Severe CKD (GFR = 15-29 mL/min/1 73 square meters)  •  Stage 5 End Stage CKD (GFR <15 mL/min/1 73 square meters)  Note: GFR calculation is accurate only with a steady state creatinine    CBC and differential [[id number]] Collected: 05/26/23 2125    Lab Status: Final result Specimen: Blood from Arm, Left Updated: 05/26/23 2135     WBC 10 12 Thousand/uL      RBC 4 67 Million/uL      Hemoglobin 13 4 g/dL      Hematocrit 40 8 %      MCV 87 fL      MCH 28 7 pg      MCHC 32 8 g/dL      RDW 13 9 %      MPV 9 1 fL      Platelets 733 Thousands/uL      nRBC 0 /100 WBCs      Neutrophils Relative 51 %      Immat GRANS % 0 %      Lymphocytes Relative 39 %      Monocytes Relative 8 %      Eosinophils Relative 2 %      Basophils Relative 0 %      Neutrophils Absolute 5 14 Thousands/µL      Immature Grans Absolute 0 02 Thousand/uL      Lymphocytes Absolute 3 99 Thousands/µL      Monocytes Absolute 0 77 Thousand/µL      Eosinophils Absolute 0 18 Thousand/µL      Basophils Absolute 0 02 Thousands/µL                  CTA ED chest PE Study   Final Result by Becky Stoddard MD (05/26 2256)      No evidence of pulmonary embolus  Workstation performed: [id number]         XR chest 1 view portable    (Results Pending)              Procedures  Procedures         ED Course             HEART Risk Score    Flowsheet Row Most Recent Value   Heart Score Risk Calculator    History 1 Filed at: 05/27/2023 0016   ECG 0 Filed at: 05/27/2023 0016   Age 1 Filed at: 05/27/2023 0016   Risk Factors 1 Filed at: 05/27/2023 0016   Troponin 1 Filed at: 05/27/2023 0016   HEART Score 4 Filed at: 05/27/2023 0016                        SBIRT 20yo+    Flowsheet Row Most Recent Value   Initial Alcohol Screen: US AUDIT-C     1  How often do you have a drink containing alcohol? 0 Filed at: 05/26/2023 2111   2  How many drinks containing alcohol do you have on a typical day you are drinking? 0 Filed at: 05/26/2023 2111   3a  Male UNDER 65: How often do you have five or more drinks on one occasion? 0 Filed at: 05/26/2023 2111   Audit-C Score 0 Filed at: 05/26/2023 2111   MARCUS: How many times in the past year have you    Used an illegal drug or used a prescription medication for non-medical reasons? Never Filed at: 05/26/2023 2111                    MDM    Disposition  Final diagnoses:   Chest pain     Time reflects when diagnosis was documented in both MDM as applicable and the Disposition within this note     Time User Action Codes Description Comment    5/27/2023 12:17 AM Shilpi Newell Add [R07 9] Chest pain       ED Disposition     ED Disposition   Discharge    Condition   Stable    Date/Time   Sat May 27, 2023 12:17 AM    Lior Martines discharge to home/self care                 Follow-up Information     Follow up With Specialties Details Why [address] Internal Medicine   [facility] [address]  [address]  [address]            Discharge Medication List as of 5/27/2023 12:17 AM      CONTINUE these medications which have NOT CHANGED    Details   albuterol (PROVENTIL HFA,VENTOLIN HFA) 90 mcg/act inhaler "Inhale 2 puffs every 4 (four) hours as needed for wheezing, Starting Mon 9/17/2018, Print      atorvastatin (LIPITOR) 40 mg tablet TAKE 1 TABLET BY MOUTH EVERY DAY AT NIGHT, Historical Med      budesonide (PULMICORT) 0 25 mg/2 mL nebulizer solution Inhale 0 25 mg, Starting Mon 3/4/2019, Until Sat 4/24/2021, Historical Med      chlorhexidine (PERIDEX) 0 12 % solution Apply 15 mL to the mouth or throat 2 (two) times a day Swish for one minute and spit, Starting Sat 6/19/2021, Normal      dicyclomine (BENTYL) 20 mg tablet Take 1 tablet (20 mg total) by mouth 2 (two) times a day for 7 days, Starting Thu 9/22/2022, Until Thu 9/29/2022, Print      ezetimibe (ZETIA) 10 mg tablet Take 10 mg by mouth daily, Starting Fri 7/29/2022, Until Sat 7/29/2023, Historical Med      fenofibrate (TRIGLIDE) 160 MG tablet Take 160 mg by mouth daily, Starting Mon 3/22/2021, Historical Med      fluticasone-salmeterol (Advair HFA) 115-21 MCG/ACT inhaler Inhale 2 puffs 2 (two) times a day, Starting Tue 2/25/2020, Until Sat 4/24/2021, Historical Med      fluticasone-vilanterol (BREO ELLIPTA) 200-25 MCG/INH inhaler Inhale 1 puff, Starting Tue 12/11/2018, Historical Med      gabapentin (NEURONTIN) 300 mg capsule Take 300 mg by mouth Three times a day, Historical Med      insulin lispro protamine-insulin lispro (HumaLOG 75/25) 100 units/mL Inject 15 Units under the skin , Starting Fri 9/28/2018, Until Sat 4/24/2021, Historical Med      Insulin Pen Needle (PEN NEEDLES 29GX1/2\") 29G X 12MM MISC 1 Device by Does not apply route, Starting Thu 11/30/2017, Historical Med      Insulin Syringe-Needle U-100 31G X 1/4\" 0 5 ML MISC 1 Syringe by Does not apply route, Starting Mon 10/8/2018, Historical Med      Lancet Devices (LANCING DEVICE) MISC Inject 1 Device under the skin, Starting Mon 3/5/2018, Historical Med      lisinopril (ZESTRIL) 20 mg tablet Take 20 mg by mouth daily, Historical Med      lisinopril-hydrochlorothiazide (PRINZIDE,ZESTORETIC) 20-25 " MG per tablet Take 1 tablet by mouth, Starting Mon 3/5/2018, Historical Med      meloxicam (MOBIC) 15 mg tablet Take 15 mg by mouth daily, Starting Wed 4/14/2021, Historical Med      metFORMIN (GLUCOPHAGE) 1000 MG tablet Take 1,000 mg by mouth 2 (two) times a day with meals  , Historical Med      methocarbamol (ROBAXIN) 500 mg tablet Take 1 tablet (500 mg total) by mouth 4 (four) times a day, Starting Mon 3/2/2020, Normal      !! naproxen (NAPROSYN) 500 mg tablet Take 1 tablet by mouth every 12 (twelve) hours, Starting Wed 10/5/2016, Historical Med      !! naproxen (NAPROSYN) 500 mg tablet Take 1 tablet (500 mg total) by mouth 2 (two) times a day with meals As needed for mild pain, Starting Sat 6/19/2021, Normal      nicotine (NICODERM CQ) 21 mg/24 hr TD 24 hr patch Place 1 patch on the skin daily, Starting Tue 12/12/2017, Print      omeprazole (PriLOSEC) 20 mg delayed release capsule Take 1 capsule (20 mg total) by mouth daily, Starting Mon 9/12/2022, Normal      ondansetron (ZOFRAN-ODT) 4 mg disintegrating tablet Take 1 tablet (4 mg total) by mouth every 6 (six) hours as needed for nausea or vomiting, Starting Mon 9/12/2022, Normal      pantoprazole (PROTONIX) 20 mg tablet Take 1 tablet (20 mg total) by mouth daily, Starting Mon 11/29/2021, Print      sildenafil (VIAGRA) 100 mg tablet Take 100 mg by mouth, Starting Mon 8/1/2022, Until Tue 8/1/2023 at 2359, Historical Med      sucralfate (CARAFATE) 1 g/10 mL suspension Take 10 mL (1 g total) by mouth 4 (four) times a day for 10 days, Starting Mon 9/12/2022, Until Thu 9/22/2022, Normal      traMADol (ULTRAM) 50 mg tablet Take 50 mg by mouth every 8 (eight) hours as needed, Starting Tue 8/9/2022, Until Wed 8/9/2023 at 2359, Historical Med       !! - Potential duplicate medications found  Please discuss with provider  No discharge procedures on file      PDMP Review       Value Time User    PDMP Reviewed  Yes 9/22/2022  9:10 PM Tania Monet PA-C ED Provider  Electronically Signed by           Steve Santiago DO  05/27/23 [id number]

## 2023-05-28 LAB
ATRIAL RATE: 67 BPM
P AXIS: 59 DEGREES
PR INTERVAL: 166 MS
QRS AXIS: 24 DEGREES
QRSD INTERVAL: 86 MS
QT INTERVAL: 368 MS
QTC INTERVAL: 388 MS
T WAVE AXIS: -2 DEGREES
VENTRICULAR RATE: 67 BPM

## 2023-08-31 ENCOUNTER — HOSPITAL ENCOUNTER (INPATIENT)
Facility: HOSPITAL | Age: 58
LOS: 2 days | Discharge: LEFT BEFORE DISCHARGE | End: 2023-09-03
Attending: EMERGENCY MEDICINE | Admitting: STUDENT IN AN ORGANIZED HEALTH CARE EDUCATION/TRAINING PROGRAM
Payer: COMMERCIAL

## 2023-08-31 ENCOUNTER — APPOINTMENT (EMERGENCY)
Dept: RADIOLOGY | Facility: HOSPITAL | Age: 58
End: 2023-08-31
Payer: COMMERCIAL

## 2023-08-31 DIAGNOSIS — R55 SYNCOPE: Primary | ICD-10-CM

## 2023-08-31 DIAGNOSIS — R79.89 ELEVATED TROPONIN: ICD-10-CM

## 2023-08-31 PROBLEM — R77.8 ELEVATED TROPONIN: Status: ACTIVE | Noted: 2023-08-31

## 2023-08-31 LAB
2HR DELTA HS TROPONIN: 24 NG/L
4HR DELTA HS TROPONIN: 20 NG/L
ALBUMIN SERPL BCP-MCNC: 4.1 G/DL (ref 3.5–5)
ALP SERPL-CCNC: 82 U/L (ref 34–104)
ALT SERPL W P-5'-P-CCNC: 27 U/L (ref 7–52)
ANION GAP SERPL CALCULATED.3IONS-SCNC: 10 MMOL/L
AST SERPL W P-5'-P-CCNC: 29 U/L (ref 13–39)
ATRIAL RATE: 90 BPM
BASE EX.OXY STD BLDV CALC-SCNC: 64.1 % (ref 60–80)
BASE EXCESS BLDV CALC-SCNC: -3.4 MMOL/L
BASOPHILS # BLD AUTO: 0.02 THOUSANDS/ÂΜL (ref 0–0.1)
BASOPHILS NFR BLD AUTO: 0 % (ref 0–1)
BETA-HYDROXYBUTYRATE: 0.4 MMOL/L
BILIRUB SERPL-MCNC: 0.56 MG/DL (ref 0.2–1)
BUN SERPL-MCNC: 14 MG/DL (ref 5–25)
CALCIUM SERPL-MCNC: 9.3 MG/DL (ref 8.4–10.2)
CARDIAC TROPONIN I PNL SERPL HS: 62 NG/L
CARDIAC TROPONIN I PNL SERPL HS: 82 NG/L
CARDIAC TROPONIN I PNL SERPL HS: 86 NG/L
CHLORIDE SERPL-SCNC: 99 MMOL/L (ref 96–108)
CO2 SERPL-SCNC: 24 MMOL/L (ref 21–32)
CREAT SERPL-MCNC: 1.08 MG/DL (ref 0.6–1.3)
EOSINOPHIL # BLD AUTO: 0.01 THOUSAND/ÂΜL (ref 0–0.61)
EOSINOPHIL NFR BLD AUTO: 0 % (ref 0–6)
ERYTHROCYTE [DISTWIDTH] IN BLOOD BY AUTOMATED COUNT: 14.8 % (ref 11.6–15.1)
GFR SERPL CREATININE-BSD FRML MDRD: 75 ML/MIN/1.73SQ M
GLUCOSE SERPL-MCNC: 139 MG/DL (ref 65–140)
GLUCOSE SERPL-MCNC: 148 MG/DL (ref 65–140)
HCO3 BLDV-SCNC: 22.1 MMOL/L (ref 24–30)
HCT VFR BLD AUTO: 47.9 % (ref 36.5–49.3)
HGB BLD-MCNC: 15.5 G/DL (ref 12–17)
IMM GRANULOCYTES # BLD AUTO: 0.02 THOUSAND/UL (ref 0–0.2)
IMM GRANULOCYTES NFR BLD AUTO: 0 % (ref 0–2)
INR PPP: 1.04 (ref 0.84–1.19)
LACTATE SERPL-SCNC: 2.1 MMOL/L (ref 0.5–2)
LACTATE SERPL-SCNC: 3 MMOL/L (ref 0.5–2)
LYMPHOCYTES # BLD AUTO: 1.91 THOUSANDS/ÂΜL (ref 0.6–4.47)
LYMPHOCYTES NFR BLD AUTO: 22 % (ref 14–44)
MCH RBC QN AUTO: 28.7 PG (ref 26.8–34.3)
MCHC RBC AUTO-ENTMCNC: 32.4 G/DL (ref 31.4–37.4)
MCV RBC AUTO: 89 FL (ref 82–98)
MONOCYTES # BLD AUTO: 1.03 THOUSAND/ÂΜL (ref 0.17–1.22)
MONOCYTES NFR BLD AUTO: 12 % (ref 4–12)
NEUTROPHILS # BLD AUTO: 5.79 THOUSANDS/ÂΜL (ref 1.85–7.62)
NEUTS SEG NFR BLD AUTO: 66 % (ref 43–75)
NRBC BLD AUTO-RTO: 0 /100 WBCS
O2 CT BLDV-SCNC: 14.7 ML/DL
P AXIS: 37 DEGREES
PCO2 BLDV: 41.6 MM HG (ref 42–50)
PH BLDV: 7.34 [PH] (ref 7.3–7.4)
PLATELET # BLD AUTO: 221 THOUSANDS/UL (ref 149–390)
PMV BLD AUTO: 9.5 FL (ref 8.9–12.7)
PO2 BLDV: 35.2 MM HG (ref 35–45)
POTASSIUM SERPL-SCNC: 4 MMOL/L (ref 3.5–5.3)
PR INTERVAL: 146 MS
PROT SERPL-MCNC: 8.2 G/DL (ref 6.4–8.4)
PROTHROMBIN TIME: 14.2 SECONDS (ref 11.6–14.5)
QRS AXIS: 92 DEGREES
QRSD INTERVAL: 92 MS
QT INTERVAL: 354 MS
QTC INTERVAL: 433 MS
RBC # BLD AUTO: 5.41 MILLION/UL (ref 3.88–5.62)
SODIUM SERPL-SCNC: 133 MMOL/L (ref 135–147)
T WAVE AXIS: 7 DEGREES
T4 FREE SERPL-MCNC: 0.76 NG/DL (ref 0.61–1.12)
TSH SERPL DL<=0.05 MIU/L-ACNC: 0.27 UIU/ML (ref 0.45–4.5)
VENTRICULAR RATE: 90 BPM
WBC # BLD AUTO: 8.78 THOUSAND/UL (ref 4.31–10.16)

## 2023-08-31 PROCEDURE — 85610 PROTHROMBIN TIME: CPT | Performed by: EMERGENCY MEDICINE

## 2023-08-31 PROCEDURE — 82010 KETONE BODYS QUAN: CPT | Performed by: EMERGENCY MEDICINE

## 2023-08-31 PROCEDURE — 99284 EMERGENCY DEPT VISIT MOD MDM: CPT

## 2023-08-31 PROCEDURE — 84484 ASSAY OF TROPONIN QUANT: CPT | Performed by: EMERGENCY MEDICINE

## 2023-08-31 PROCEDURE — 99285 EMERGENCY DEPT VISIT HI MDM: CPT | Performed by: EMERGENCY MEDICINE

## 2023-08-31 PROCEDURE — 80053 COMPREHEN METABOLIC PANEL: CPT | Performed by: EMERGENCY MEDICINE

## 2023-08-31 PROCEDURE — 84443 ASSAY THYROID STIM HORMONE: CPT | Performed by: EMERGENCY MEDICINE

## 2023-08-31 PROCEDURE — 99223 1ST HOSP IP/OBS HIGH 75: CPT | Performed by: INTERNAL MEDICINE

## 2023-08-31 PROCEDURE — 96366 THER/PROPH/DIAG IV INF ADDON: CPT

## 2023-08-31 PROCEDURE — 93010 ELECTROCARDIOGRAM REPORT: CPT | Performed by: INTERNAL MEDICINE

## 2023-08-31 PROCEDURE — 93005 ELECTROCARDIOGRAM TRACING: CPT

## 2023-08-31 PROCEDURE — 96365 THER/PROPH/DIAG IV INF INIT: CPT

## 2023-08-31 PROCEDURE — 83605 ASSAY OF LACTIC ACID: CPT | Performed by: EMERGENCY MEDICINE

## 2023-08-31 PROCEDURE — 84439 ASSAY OF FREE THYROXINE: CPT | Performed by: EMERGENCY MEDICINE

## 2023-08-31 PROCEDURE — 71046 X-RAY EXAM CHEST 2 VIEWS: CPT

## 2023-08-31 PROCEDURE — 82948 REAGENT STRIP/BLOOD GLUCOSE: CPT

## 2023-08-31 PROCEDURE — 85025 COMPLETE CBC W/AUTO DIFF WBC: CPT | Performed by: EMERGENCY MEDICINE

## 2023-08-31 PROCEDURE — 82805 BLOOD GASES W/O2 SATURATION: CPT | Performed by: EMERGENCY MEDICINE

## 2023-08-31 PROCEDURE — 0241U HB NFCT DS VIR RESP RNA 4 TRGT: CPT | Performed by: PHYSICIAN ASSISTANT

## 2023-08-31 PROCEDURE — 36415 COLL VENOUS BLD VENIPUNCTURE: CPT | Performed by: EMERGENCY MEDICINE

## 2023-08-31 RX ORDER — SODIUM CHLORIDE 9 MG/ML
100 INJECTION, SOLUTION INTRAVENOUS CONTINUOUS
Status: DISCONTINUED | OUTPATIENT
Start: 2023-08-31 | End: 2023-09-03 | Stop reason: HOSPADM

## 2023-08-31 RX ORDER — NICOTINE 21 MG/24HR
1 PATCH, TRANSDERMAL 24 HOURS TRANSDERMAL DAILY
Status: DISCONTINUED | OUTPATIENT
Start: 2023-09-01 | End: 2023-09-03 | Stop reason: HOSPADM

## 2023-08-31 RX ORDER — GUAIFENESIN/DEXTROMETHORPHAN 100-10MG/5
10 SYRUP ORAL EVERY 6 HOURS PRN
Status: DISCONTINUED | OUTPATIENT
Start: 2023-08-31 | End: 2023-09-03 | Stop reason: HOSPADM

## 2023-08-31 RX ORDER — ONDANSETRON 2 MG/ML
4 INJECTION INTRAMUSCULAR; INTRAVENOUS EVERY 6 HOURS PRN
Status: DISCONTINUED | OUTPATIENT
Start: 2023-08-31 | End: 2023-09-03 | Stop reason: HOSPADM

## 2023-08-31 RX ORDER — GABAPENTIN 300 MG/1
300 CAPSULE ORAL 3 TIMES DAILY
Status: DISCONTINUED | OUTPATIENT
Start: 2023-08-31 | End: 2023-09-03 | Stop reason: HOSPADM

## 2023-08-31 RX ORDER — ATORVASTATIN CALCIUM 40 MG/1
40 TABLET, FILM COATED ORAL
Status: DISCONTINUED | OUTPATIENT
Start: 2023-09-01 | End: 2023-08-31

## 2023-08-31 RX ORDER — EZETIMIBE 10 MG/1
10 TABLET ORAL DAILY
Status: DISCONTINUED | OUTPATIENT
Start: 2023-09-01 | End: 2023-09-03 | Stop reason: HOSPADM

## 2023-08-31 RX ORDER — INSULIN LISPRO 100 [IU]/ML
1-5 INJECTION, SOLUTION INTRAVENOUS; SUBCUTANEOUS
Status: DISCONTINUED | OUTPATIENT
Start: 2023-08-31 | End: 2023-08-31

## 2023-08-31 RX ORDER — INSULIN LISPRO 100 [IU]/ML
1-6 INJECTION, SOLUTION INTRAVENOUS; SUBCUTANEOUS
Status: DISCONTINUED | OUTPATIENT
Start: 2023-09-01 | End: 2023-08-31

## 2023-08-31 RX ORDER — ALBUTEROL SULFATE 90 UG/1
2 AEROSOL, METERED RESPIRATORY (INHALATION) EVERY 4 HOURS PRN
Status: DISCONTINUED | OUTPATIENT
Start: 2023-08-31 | End: 2023-09-03 | Stop reason: HOSPADM

## 2023-08-31 RX ORDER — INSULIN ASPART 100 [IU]/ML
8 INJECTION, SUSPENSION SUBCUTANEOUS
Status: DISCONTINUED | OUTPATIENT
Start: 2023-09-01 | End: 2023-08-31

## 2023-08-31 RX ORDER — PANTOPRAZOLE SODIUM 40 MG/1
40 TABLET, DELAYED RELEASE ORAL
Status: DISCONTINUED | OUTPATIENT
Start: 2023-09-01 | End: 2023-09-03 | Stop reason: HOSPADM

## 2023-08-31 RX ORDER — LISINOPRIL 20 MG/1
20 TABLET ORAL DAILY
Status: DISCONTINUED | OUTPATIENT
Start: 2023-09-01 | End: 2023-09-03 | Stop reason: HOSPADM

## 2023-08-31 RX ORDER — FENOFIBRATE 145 MG/1
145 TABLET, COATED ORAL DAILY
Status: DISCONTINUED | OUTPATIENT
Start: 2023-09-01 | End: 2023-09-03 | Stop reason: HOSPADM

## 2023-08-31 RX ORDER — SODIUM CHLORIDE, SODIUM GLUCONATE, SODIUM ACETATE, POTASSIUM CHLORIDE, MAGNESIUM CHLORIDE, SODIUM PHOSPHATE, DIBASIC, AND POTASSIUM PHOSPHATE .53; .5; .37; .037; .03; .012; .00082 G/100ML; G/100ML; G/100ML; G/100ML; G/100ML; G/100ML; G/100ML
2000 INJECTION, SOLUTION INTRAVENOUS ONCE
Status: COMPLETED | OUTPATIENT
Start: 2023-08-31 | End: 2023-08-31

## 2023-08-31 RX ORDER — ATORVASTATIN CALCIUM 40 MG/1
40 TABLET, FILM COATED ORAL
Status: DISCONTINUED | OUTPATIENT
Start: 2023-08-31 | End: 2023-09-03 | Stop reason: HOSPADM

## 2023-08-31 RX ORDER — FLUTICASONE FUROATE AND VILANTEROL 200; 25 UG/1; UG/1
1 POWDER RESPIRATORY (INHALATION) DAILY
Status: DISCONTINUED | OUTPATIENT
Start: 2023-09-01 | End: 2023-09-03 | Stop reason: HOSPADM

## 2023-08-31 RX ORDER — ACETAMINOPHEN 325 MG/1
650 TABLET ORAL EVERY 6 HOURS PRN
Status: DISCONTINUED | OUTPATIENT
Start: 2023-08-31 | End: 2023-09-03 | Stop reason: HOSPADM

## 2023-08-31 RX ORDER — HEPARIN SODIUM 5000 [USP'U]/ML
5000 INJECTION, SOLUTION INTRAVENOUS; SUBCUTANEOUS EVERY 8 HOURS SCHEDULED
Status: DISCONTINUED | OUTPATIENT
Start: 2023-09-01 | End: 2023-09-03 | Stop reason: HOSPADM

## 2023-08-31 RX ADMIN — SODIUM CHLORIDE, SODIUM GLUCONATE, SODIUM ACETATE, POTASSIUM CHLORIDE, MAGNESIUM CHLORIDE, SODIUM PHOSPHATE, DIBASIC, AND POTASSIUM PHOSPHATE 2000 ML: .53; .5; .37; .037; .03; .012; .00082 INJECTION, SOLUTION INTRAVENOUS at 14:06

## 2023-08-31 RX ADMIN — SODIUM CHLORIDE 100 ML/HR: 0.9 INJECTION, SOLUTION INTRAVENOUS at 19:15

## 2023-08-31 RX ADMIN — ACETAMINOPHEN 650 MG: 325 TABLET, FILM COATED ORAL at 21:15

## 2023-08-31 RX ADMIN — GUAIFENESIN AND DEXTROMETHORPHAN 10 ML: 100; 10 SYRUP ORAL at 22:44

## 2023-08-31 RX ADMIN — ATORVASTATIN CALCIUM 40 MG: 40 TABLET, FILM COATED ORAL at 22:44

## 2023-08-31 NOTE — ASSESSMENT & PLAN NOTE
report having history of syncope approximately 2 years ago and still has loop recorder in place. Patient reports that he was told he had some pauses but not concerning as per his primary cardiology which has now moved from [facility] to [facility]) and he has not had follow-up. Patient reports that he was sitting on a swing with the wife and suddenly started feeling lightheadedness, and felt he was going to pass out and subsequently lost consciousness without fall or head strike. Noted with episode of fever. CBC, CMP grossly within normal limits. HS troponin mildly elevated however flat. EKG normal sinus rhythm. Chest x-ray pulm negative for acute finding. Lactic acidosis resolved    Unclear etiology. Differential diagnosis includes cardiogenic vs neurogenic vs orthostatic hypotension vs hypoglycemia. 2D echo report pending. Continue telemetry monitoring. Follow-up with cardiology consult  Follow-up with orthostatic vitals.

## 2023-08-31 NOTE — ASSESSMENT & PLAN NOTE
Lab Results   Component Value Date    HGBA1C 9.3 (H) 08/01/2023       No results for input(s): "POCGLU" in the last 72 hours.     Blood Sugar Average: Last 72 hrs:     A1c noted to be 9.3  We will continue with insulin regiment  Hold home metformin

## 2023-08-31 NOTE — ASSESSMENT & PLAN NOTE
72-year-old male initially presented after syncopal episode occurred earlier today  Does have admission previously due to syncopal episode in 2022  Suspect possibly from volume depletion given elevated lactate  We will provide IV fluids  Was noted to have an elevated troponin of 62, 86 possibly in setting of demand ischemia  We will place on telemetry monitoring  Check orthostatics  Check echo

## 2023-08-31 NOTE — ASSESSMENT & PLAN NOTE
Troponin noted to 62, 86, 90  Currently without chest pain or shortness of breath  Denies any history of CAD in the past.  2D echo pending. Start on aspirin 81 mg daily, continue statin.

## 2023-08-31 NOTE — H&P
[address]  H&P  Name: Karen Wang 63 y.o. male I MRN: [id number]  Unit/Bed#: FT 01 I Date of Admission: 8/31/2023   Date of Service: 8/31/2023 I Hospital Day: 0      Assessment/Plan   * Syncope  Assessment & Plan  21-year-old male initially presented after syncopal episode occurred earlier today  Does have admission previously due to syncopal episode in 2022  Suspect possibly from volume depletion given elevated lactate  We will provide IV fluids  Was noted to have an elevated troponin of 62, 86 possibly in setting of demand ischemia  We will place on telemetry monitoring  Check orthostatics  Check echo    Elevated troponin  Assessment & Plan   Troponin noted to 62, 86  Follow-up third troponin  Currently without chest pain or shortness of breath  Possibly demand ischemia  Check echo  Telemetry monitoring    Tobacco use  Assessment & Plan  Smoking cessation counseling provided    Type 2 diabetes mellitus ([address])  Assessment & Plan  Lab Results   Component Value Date    HGBA1C 9.3 (H) 08/01/2023       No results for input(s): "POCGLU" in the last 72 hours. Blood Sugar Average: Last 72 hrs:     A1c noted to be 9.3  We will continue with insulin regiment  Hold home metformin    Hyperlipidemia  Assessment & Plan  Continue statin    Hypertension  Assessment & Plan  Blood pressure currently controlled  Continue home BP medications         VTE Prophylaxis: Heparin  / sequential compression device   Code Status: full code  POLST: There is no POLST form on file for this patient (pre-hospital)  Discussion with family: pt    Anticipated Length of Stay:  Patient will be admitted on an Observation basis with an anticipated length of stay of  < 2 midnights. Justification for Hospital Stay: Syncope    Total Time for Visit, including Counseling / Coordination of Care: 60 minutes. Greater than 50% of this total time spent on direct patient counseling and coordination of care.     Chief Complaint: Syncope    History of Present Illness:    Alize Rios is a 63 y.o. male with past medical history significant of today. Reports no  hypertension, type 2 diabetes, hyperlipidemia initially presented with syncopal episode. Patient reports syncopal episode earlier palpitations prior to syncope. Denies any prodromal symptoms prior. Denies any urinary incontinence or tongue biting. Denies any nausea, vomiting, diarrhea or any other complaints. Denies chest pain, palpitations, shortness of breath    Review of Systems:    Review of Systems   Constitutional: Negative for appetite change, chills, diaphoresis, fatigue, fever and unexpected weight change. HENT: Negative for congestion, rhinorrhea and sore throat. Eyes: Negative for photophobia and visual disturbance. Respiratory: Negative for cough, shortness of breath and wheezing. Cardiovascular: Negative for chest pain, palpitations and leg swelling. Gastrointestinal: Negative for abdominal pain, anal bleeding, blood in stool, constipation, diarrhea, nausea and vomiting. Genitourinary: Negative for decreased urine volume, difficulty urinating, dysuria, flank pain, frequency, hematuria and urgency. Musculoskeletal: Negative for arthralgias, back pain, joint swelling and myalgias. Skin: Negative for color change and rash. Neurological: Positive for syncope. Negative for dizziness, seizures, facial asymmetry, speech difficulty, numbness and headaches. Psychiatric/Behavioral: Negative for agitation, confusion and decreased concentration. The patient is not nervous/anxious.         Past Medical and Surgical History:     Past Medical History:   Diagnosis Date   • Asthma    • Cardiac murmur    • COPD (chronic obstructive pulmonary disease) (HCC)    • Diabetes mellitus ([address])    • Hyperlipidemia    • Hypertension        Past Surgical History:   Procedure Laterality Date   • APPENDECTOMY      teenager        Meds/Allergies:    Prior to Admission medications    Medication Sig Start Date End Date Taking?  Authorizing Provider   albuterol (PROVENTIL HFA,VENTOLIN HFA) 90 mcg/act inhaler Inhale 2 puffs every 4 (four) hours as needed for wheezing 9/17/18   Joan Kimball MD   atorvastatin (LIPITOR) 40 mg tablet TAKE 1 TABLET BY MOUTH EVERY DAY AT NIGHT 2/17/19   Historical Provider, MD   budesonide (PULMICORT) 0.25 mg/2 mL nebulizer solution Inhale 0.25 mg 3/4/19 4/24/21  Historical Provider, MD   chlorhexidine (PERIDEX) 0.12 % solution Apply 15 mL to the mouth or throat 2 (two) times a day Swish for one minute and spit 6/19/21   Shade Cole DO   dicyclomine (BENTYL) 20 mg tablet Take 1 tablet (20 mg total) by mouth 2 (two) times a day for 7 days 9/22/22 9/29/22  Claude Mckinney PA-C   ezetimibe (ZETIA) 10 mg tablet Take 10 mg by mouth daily 7/29/22 7/29/23  Historical Provider, MD   fenofibrate (TRIGLIDE) 160 MG tablet Take 160 mg by mouth daily 3/22/21   Historical Provider, MD   fluticasone-salmeterol (Advair HFA) 115-21 MCG/ACT inhaler Inhale 2 puffs 2 (two) times a day 2/25/20 4/24/21  Historical Provider, MD   fluticasone-vilanterol (BREO ELLIPTA) 200-25 MCG/INH inhaler Inhale 1 puff 12/11/18   Historical Provider, MD   gabapentin (NEURONTIN) 300 mg capsule Take 300 mg by mouth Three times a day    Historical Provider, MD   insulin lispro protamine-insulin lispro (HumaLOG 75/25) 100 units/mL Inject 15 Units under the skin  9/28/18 4/24/21  Historical Provider, MD   Insulin Pen Needle (PEN NEEDLES 29GX1/2") 29G X 12MM MISC 1 Device by Does not apply route 11/30/17   Historical Provider, MD   Insulin Syringe-Needle U-100 31G X 1/4" 0.5 ML MISC 1 Syringe by Does not apply route 10/8/18   Historical Provider, MD   Lancet Devices (LANCING DEVICE) MISC Inject 1 Device under the skin 3/5/18   Historical Provider, MD   lisinopril (ZESTRIL) 20 mg tablet Take 20 mg by mouth daily    Historical Provider, MD   lisinopril-hydrochlorothiazide (PRINZIDE,ZESTORETIC) 20-25 MG per tablet Take 1 tablet by mouth 3/5/18   Historical Provider, MD   meloxicam (MOBIC) 15 mg tablet Take 15 mg by mouth daily 4/14/21   Historical Provider, MD   metFORMIN (GLUCOPHAGE) 1000 MG tablet Take 1,000 mg by mouth 2 (two) times a day with meals. Historical Provider, MD   methocarbamol (ROBAXIN) 500 mg tablet Take 1 tablet (500 mg total) by mouth 4 (four) times a day  Patient not taking: Reported on 4/24/2021 3/2/20   Lester Contreras PA-C   naproxen (NAPROSYN) 500 mg tablet Take 1 tablet by mouth every 12 (twelve) hours 10/5/16   Historical Provider, MD   naproxen (NAPROSYN) 500 mg tablet Take 1 tablet (500 mg total) by mouth 2 (two) times a day with meals As needed for mild pain 6/19/21   Cuong Cole DO   nicotine (NICODERM CQ) 21 mg/24 hr TD 24 hr patch Place 1 patch on the skin daily 12/12/17   Donovan Melgar MD   omeprazole (PriLOSEC) 20 mg delayed release capsule Take 1 capsule (20 mg total) by mouth daily 9/12/22   Simone Pierre MD   ondansetron (ZOFRAN-ODT) 4 mg disintegrating tablet Take 1 tablet (4 mg total) by mouth every 6 (six) hours as needed for nausea or vomiting 9/12/22   Simone Pierre MD   pantoprazole (PROTONIX) 20 mg tablet Take 1 tablet (20 mg total) by mouth daily 11/29/21   Anahi Alfaro DO   sildenafil (VIAGRA) 100 mg tablet Take 100 mg by mouth 8/1/22 8/1/23  Historical Provider, MD   sucralfate (CARAFATE) 1 g/10 mL suspension Take 10 mL (1 g total) by mouth 4 (four) times a day for 10 days 9/12/22 9/22/22  Simone Pierre MD     I have reviewed home medications with patient personally. Allergies: No Known Allergies    Social History:     Marital Status:       Patient Pre-hospital Living Situation: home  Patient Pre-hospital Level of Mobility: independent  Patient Pre-hospital Diet Restrictions: none  Substance Use History:   Social History     Substance and Sexual Activity   Alcohol Use Yes    Comment: occassional     Social History     Tobacco Use   Smoking Status Some Days   • Packs/day: 0.25   • Years: 25.00   • Total pack years: 6.25   • Types: Cigarettes   Smokeless Tobacco Never     Social History     Substance and Sexual Activity   Drug Use No       Family History:    Family History   Problem Relation Age of Onset   • Hypertension Mother    • Hypertension Father        Physical Exam:     Vitals:   Blood Pressure: 139/77 (08/31/23 1316)  Pulse: 98 (08/31/23 1900)  Temperature: 98.8 °F (37.1 °C) (08/31/23 1316)  Temp Source: Oral (08/31/23 1316)  Respirations: 18 (08/31/23 1316)  Weight - Scale: 82.1 kg (181 lb) (08/31/23 1316)  SpO2: 98 % (08/31/23 1900)    Physical Exam  Constitutional:       General: He is not in acute distress. Appearance: He is well-developed. He is not diaphoretic. HENT:      Head: Normocephalic and atraumatic. Nose: Nose normal.      Mouth/Throat:      Pharynx: No oropharyngeal exudate. Eyes:      General: No scleral icterus. Conjunctiva/sclera: Conjunctivae normal.   Cardiovascular:      Rate and Rhythm: Normal rate and regular rhythm. Heart sounds: Normal heart sounds. No murmur heard. No friction rub. No gallop. Pulmonary:      Effort: Pulmonary effort is normal. No respiratory distress. Breath sounds: Normal breath sounds. No wheezing or rales. Chest:      Chest wall: No tenderness. Abdominal:      General: Bowel sounds are normal. There is no distension. Palpations: Abdomen is soft. Tenderness: There is no abdominal tenderness. There is no guarding. Musculoskeletal:         General: No tenderness or deformity. Normal range of motion. Cervical back: Normal range of motion and neck supple. Skin:     General: Skin is warm and dry. Findings: No erythema. Neurological:      Mental Status: He is alert. Mental status is at baseline. Additional Data:     Lab Results: I have personally reviewed pertinent reports. Results from last 7 days   Lab Units 08/31/23  1409   WBC Thousand/uL 8.78   HEMOGLOBIN g/dL 15.5   HEMATOCRIT % 47.9   PLATELETS Thousands/uL 221   NEUTROS PCT % 66   LYMPHS PCT % 22   MONOS PCT % 12   EOS PCT % 0     Results from last 7 days   Lab Units 08/31/23  1409   SODIUM mmol/L 133*   POTASSIUM mmol/L 4.0   CHLORIDE mmol/L 99   CO2 mmol/L 24   BUN mg/dL 14   CREATININE mg/dL 1.08   ANION GAP mmol/L 10   CALCIUM mg/dL 9.3   ALBUMIN g/dL 4.1   TOTAL BILIRUBIN mg/dL 0.56   ALK PHOS U/L 82   ALT U/L 27   AST U/L 29   GLUCOSE RANDOM mg/dL 148*     Results from last 7 days   Lab Units 08/31/23  1409   INR  1.04             Results from last 7 days   Lab Units 08/31/23  1632 08/31/23  1409   LACTIC ACID mmol/L 2.1* 3.0*       Imaging: I have personally reviewed pertinent reports. XR chest 2 views   Final Result by Taiwo Vaughn MD (08/31 1515)      No focal consolidation, pleural effusion, or pneumothorax. Resident: Merle Cornell, the attending radiologist, have reviewed the images and agree with the final report above. Workstation performed: [id number]             EKG, Pathology, and Other Studies Reviewed on Admission:   · EKG: reviewed    Allscripts / Epic Records Reviewed: Yes     ** Please Note: This note has been constructed using a voice recognition system.  **

## 2023-09-01 ENCOUNTER — APPOINTMENT (OUTPATIENT)
Dept: NON INVASIVE DIAGNOSTICS | Facility: HOSPITAL | Age: 58
End: 2023-09-01
Payer: COMMERCIAL

## 2023-09-01 PROBLEM — R65.10 SIRS (SYSTEMIC INFLAMMATORY RESPONSE SYNDROME) (HCC): Status: ACTIVE | Noted: 2023-09-01

## 2023-09-01 LAB
ANION GAP SERPL CALCULATED.3IONS-SCNC: 6 MMOL/L
AORTIC ROOT: 3.6 CM
APICAL FOUR CHAMBER EJECTION FRACTION: 63 %
ASCENDING AORTA: 3.5 CM
BASOPHILS # BLD AUTO: 0.02 THOUSANDS/ÂΜL (ref 0–0.1)
BASOPHILS NFR BLD AUTO: 0 % (ref 0–1)
BUN SERPL-MCNC: 13 MG/DL (ref 5–25)
CALCIUM SERPL-MCNC: 8.8 MG/DL (ref 8.4–10.2)
CARDIAC TROPONIN I PNL SERPL HS: 93 NG/L (ref 8–18)
CHLORIDE SERPL-SCNC: 102 MMOL/L (ref 96–108)
CO2 SERPL-SCNC: 26 MMOL/L (ref 21–32)
CREAT SERPL-MCNC: 0.81 MG/DL (ref 0.6–1.3)
E WAVE DECELERATION TIME: 211 MS
EOSINOPHIL # BLD AUTO: 0.04 THOUSAND/ÂΜL (ref 0–0.61)
EOSINOPHIL NFR BLD AUTO: 1 % (ref 0–6)
ERYTHROCYTE [DISTWIDTH] IN BLOOD BY AUTOMATED COUNT: 14.6 % (ref 11.6–15.1)
FLUAV RNA RESP QL NAA+PROBE: NEGATIVE
FLUBV RNA RESP QL NAA+PROBE: NEGATIVE
FRACTIONAL SHORTENING: 29 (ref 28–44)
GFR SERPL CREATININE-BSD FRML MDRD: 98 ML/MIN/1.73SQ M
GLUCOSE P FAST SERPL-MCNC: 129 MG/DL (ref 65–99)
GLUCOSE SERPL-MCNC: 124 MG/DL (ref 65–140)
GLUCOSE SERPL-MCNC: 125 MG/DL (ref 65–140)
GLUCOSE SERPL-MCNC: 129 MG/DL (ref 65–140)
GLUCOSE SERPL-MCNC: 141 MG/DL (ref 65–140)
GLUCOSE SERPL-MCNC: 147 MG/DL (ref 65–140)
HCT VFR BLD AUTO: 42.3 % (ref 36.5–49.3)
HCV AB SER QL: NORMAL
HGB BLD-MCNC: 14 G/DL (ref 12–17)
HIV 1+2 AB+HIV1 P24 AG SERPL QL IA: NORMAL
HIV 2 AB SERPL QL IA: NORMAL
HIV1 AB SERPL QL IA: NORMAL
HIV1 P24 AG SERPL QL IA: NORMAL
IMM GRANULOCYTES # BLD AUTO: 0.01 THOUSAND/UL (ref 0–0.2)
IMM GRANULOCYTES NFR BLD AUTO: 0 % (ref 0–2)
INTERVENTRICULAR SEPTUM IN DIASTOLE (PARASTERNAL SHORT AXIS VIEW): 1.5 CM
INTERVENTRICULAR SEPTUM: 1.5 CM (ref 0.6–1.1)
LA/AORTA RATIO 2D: 0.94
LAAS-AP2: 14.1 CM2
LAAS-AP4: 14.6 CM2
LACTATE SERPL-SCNC: 0.7 MMOL/L (ref 0.5–2)
LEFT ATRIUM SIZE: 3.4 CM
LEFT ATRIUM VOLUME (MOD BIPLANE): 38 ML
LEFT INTERNAL DIMENSION IN SYSTOLE: 3 CM (ref 2.1–4)
LEFT VENTRICULAR INTERNAL DIMENSION IN DIASTOLE: 4.2 CM (ref 3.5–6)
LEFT VENTRICULAR POSTERIOR WALL IN END DIASTOLE: 1.4 CM
LEFT VENTRICULAR STROKE VOLUME: 44 ML
LVSV (TEICH): 44 ML
LYMPHOCYTES # BLD AUTO: 1.51 THOUSANDS/ÂΜL (ref 0.6–4.47)
LYMPHOCYTES NFR BLD AUTO: 27 % (ref 14–44)
MCH RBC QN AUTO: 28.3 PG (ref 26.8–34.3)
MCHC RBC AUTO-ENTMCNC: 33.1 G/DL (ref 31.4–37.4)
MCV RBC AUTO: 86 FL (ref 82–98)
MONOCYTES # BLD AUTO: 0.6 THOUSAND/ÂΜL (ref 0.17–1.22)
MONOCYTES NFR BLD AUTO: 11 % (ref 4–12)
MV E'TISSUE VEL-SEP: 8 CM/S
MV PEAK A VEL: 0.9 M/S
MV PEAK E VEL: 111 CM/S
MV STENOSIS PRESSURE HALF TIME: 62 MS
MV VALVE AREA P 1/2 METHOD: 3.55
NEUTROPHILS # BLD AUTO: 3.44 THOUSANDS/ÂΜL (ref 1.85–7.62)
NEUTS SEG NFR BLD AUTO: 61 % (ref 43–75)
NRBC BLD AUTO-RTO: 0 /100 WBCS
PLATELET # BLD AUTO: 197 THOUSANDS/UL (ref 149–390)
PMV BLD AUTO: 9.3 FL (ref 8.9–12.7)
POTASSIUM SERPL-SCNC: 3.5 MMOL/L (ref 3.5–5.3)
RBC # BLD AUTO: 4.94 MILLION/UL (ref 3.88–5.62)
RSV RNA RESP QL NAA+PROBE: NEGATIVE
SARS-COV-2 RNA RESP QL NAA+PROBE: NEGATIVE
SL CV PED ECHO LEFT VENTRICLE DIASTOLIC VOLUME (MOD BIPLANE) 2D: 79 ML
SL CV PED ECHO LEFT VENTRICLE SYSTOLIC VOLUME (MOD BIPLANE) 2D: 35 ML
SODIUM SERPL-SCNC: 134 MMOL/L (ref 135–147)
TRICUSPID ANNULAR PLANE SYSTOLIC EXCURSION: 2.8 CM
WBC # BLD AUTO: 5.62 THOUSAND/UL (ref 4.31–10.16)

## 2023-09-01 PROCEDURE — 87389 HIV-1 AG W/HIV-1&-2 AB AG IA: CPT | Performed by: STUDENT IN AN ORGANIZED HEALTH CARE EDUCATION/TRAINING PROGRAM

## 2023-09-01 PROCEDURE — 93306 TTE W/DOPPLER COMPLETE: CPT

## 2023-09-01 PROCEDURE — 84484 ASSAY OF TROPONIN QUANT: CPT | Performed by: STUDENT IN AN ORGANIZED HEALTH CARE EDUCATION/TRAINING PROGRAM

## 2023-09-01 PROCEDURE — 82948 REAGENT STRIP/BLOOD GLUCOSE: CPT

## 2023-09-01 PROCEDURE — 83605 ASSAY OF LACTIC ACID: CPT | Performed by: STUDENT IN AN ORGANIZED HEALTH CARE EDUCATION/TRAINING PROGRAM

## 2023-09-01 PROCEDURE — 85025 COMPLETE CBC W/AUTO DIFF WBC: CPT | Performed by: INTERNAL MEDICINE

## 2023-09-01 PROCEDURE — 81001 URINALYSIS AUTO W/SCOPE: CPT | Performed by: EMERGENCY MEDICINE

## 2023-09-01 PROCEDURE — 86803 HEPATITIS C AB TEST: CPT | Performed by: STUDENT IN AN ORGANIZED HEALTH CARE EDUCATION/TRAINING PROGRAM

## 2023-09-01 PROCEDURE — 93306 TTE W/DOPPLER COMPLETE: CPT | Performed by: INTERNAL MEDICINE

## 2023-09-01 PROCEDURE — 99233 SBSQ HOSP IP/OBS HIGH 50: CPT | Performed by: STUDENT IN AN ORGANIZED HEALTH CARE EDUCATION/TRAINING PROGRAM

## 2023-09-01 PROCEDURE — 87040 BLOOD CULTURE FOR BACTERIA: CPT | Performed by: STUDENT IN AN ORGANIZED HEALTH CARE EDUCATION/TRAINING PROGRAM

## 2023-09-01 PROCEDURE — 87207 SMEAR SPECIAL STAIN: CPT | Performed by: STUDENT IN AN ORGANIZED HEALTH CARE EDUCATION/TRAINING PROGRAM

## 2023-09-01 PROCEDURE — 80048 BASIC METABOLIC PNL TOTAL CA: CPT | Performed by: INTERNAL MEDICINE

## 2023-09-01 RX ADMIN — EZETIMIBE 10 MG: 10 TABLET ORAL at 09:14

## 2023-09-01 RX ADMIN — ACETAMINOPHEN 650 MG: 325 TABLET, FILM COATED ORAL at 15:13

## 2023-09-01 RX ADMIN — HEPARIN SODIUM 5000 UNITS: 5000 INJECTION INTRAVENOUS; SUBCUTANEOUS at 21:01

## 2023-09-01 RX ADMIN — FENOFIBRATE 145 MG: 145 TABLET, FILM COATED ORAL at 09:14

## 2023-09-01 RX ADMIN — HEPARIN SODIUM 5000 UNITS: 5000 INJECTION INTRAVENOUS; SUBCUTANEOUS at 15:00

## 2023-09-01 RX ADMIN — ASPIRIN 81 MG: 81 TABLET, COATED ORAL at 12:45

## 2023-09-01 RX ADMIN — ATORVASTATIN CALCIUM 40 MG: 40 TABLET, FILM COATED ORAL at 17:00

## 2023-09-01 RX ADMIN — FLUTICASONE FUROATE AND VILANTEROL TRIFENATATE 1 PUFF: 200; 25 POWDER RESPIRATORY (INHALATION) at 09:00

## 2023-09-01 RX ADMIN — LISINOPRIL 20 MG: 20 TABLET ORAL at 09:14

## 2023-09-01 RX ADMIN — PANTOPRAZOLE SODIUM 40 MG: 40 TABLET, DELAYED RELEASE ORAL at 05:15

## 2023-09-01 RX ADMIN — NICOTINE 1 PATCH: 14 PATCH, EXTENDED RELEASE TRANSDERMAL at 09:14

## 2023-09-01 RX ADMIN — ACETAMINOPHEN 650 MG: 325 TABLET, FILM COATED ORAL at 03:10

## 2023-09-01 RX ADMIN — HEPARIN SODIUM 5000 UNITS: 5000 INJECTION INTRAVENOUS; SUBCUTANEOUS at 05:15

## 2023-09-01 NOTE — ASSESSMENT & PLAN NOTE
Noted with tachycardia, fever, lactic acidosis. Lactic acidosis has now resolved  Patient reports his mother-in-law was ill with GI bug and patient was also not feeling well 3 days prior to presentation with episodes of fever and chills. Suspected viral etiology. Currently patient does report having fever intermittently, denies chills, myalgia, nausea, vomiting, cough, rhinorrhea, headache, visual disturbance, dysuria, diarrhea, any other new complaints. Denies skin rash, tick bites or bug bites. Follow-up with Lyme titers, HIV, hep C, 2 sets of blood cultures. Clinically patient appears nontoxic and hemodynamically stable. Continue to monitor off antibiotics for now. Low threshold to initiate broad-spectrum antibiotics if noted with worsening signs of infection.

## 2023-09-01 NOTE — ED PROVIDER NOTES
History  Chief Complaint   Patient presents with   • Syncope     Pt came in ems reporting he passed out earlier today. Pt has not been feeling well the last 3 days. 60-year-old male patient presents emergency department for evaluation of a syncopal episode. The patient had an appropriate prodrome for syncope, states that he felt dizzy, lightheaded, dehydrated, and then passed out one time. The patient did quick recovery. The patient did not strike his head when he fell, he states that he was lowered to the ground. The patient is currently awake, alert, oriented, to person place time and events. The patient states generally feeling unwell for the last several days which has progressively worsened over the last few hours. Differential diagnosis includes was not limited to acute coronary syndrome, dehydration, diabetic ketoacidosis. History provided by:  Patient   used: No    Syncope  Episode history:  Single  Most recent episode: Today  Timing:  Constant  Progression:  Worsening  Chronicity:  New  Relieved by:  Nothing  Worsened by:  Nothing  Ineffective treatments:  None tried  Associated symptoms: no confusion, no dizziness and no malaise/fatigue        Prior to Admission Medications   Prescriptions Last Dose Informant Patient Reported? Taking?    Insulin Pen Needle (PEN NEEDLES 29GX1/2") 29G X 12MM MISC  Self Yes No   Si Device by Does not apply route   Insulin Syringe-Needle U-100 31G X 1/4" 0.5 ML MISC  Self Yes No   Si Syringe by Does not apply route   Lancet Devices (LANCING DEVICE) MISC  Self Yes No   Sig: Inject 1 Device under the skin   albuterol (PROVENTIL HFA,VENTOLIN HFA) 90 mcg/act inhaler 2023 Self No No   Sig: Inhale 2 puffs every 4 (four) hours as needed for wheezing   atorvastatin (LIPITOR) 40 mg tablet 2023 Self Yes Yes   Sig: TAKE 1 TABLET BY MOUTH EVERY DAY AT NIGHT   budesonide (PULMICORT) 0.25 mg/2 mL nebulizer solution  Self Yes No   Sig: Inhale 0.25 mg   chlorhexidine (PERIDEX) 0.12 % solution Past Week  No Yes   Sig: Apply 15 mL to the mouth or throat 2 (two) times a day Swish for one minute and spit   dicyclomine (BENTYL) 20 mg tablet   No No   Sig: Take 1 tablet (20 mg total) by mouth 2 (two) times a day for 7 days   ezetimibe (ZETIA) 10 mg tablet   Yes No   Sig: Take 10 mg by mouth daily   fenofibrate (TRIGLIDE) 160 MG tablet   Yes No   Sig: Take 160 mg by mouth daily Not taking   fluticasone-salmeterol (Advair HFA) 115-21 MCG/ACT inhaler   Yes No   Sig: Inhale 2 puffs 2 (two) times a day   insulin lispro protamine-insulin lispro (HumaLOG 75/25) 100 units/mL  Self Yes No   Sig: Inject 15 Units under the skin    lisinopril (ZESTRIL) 20 mg tablet More than a month Self Yes No   Sig: Take 20 mg by mouth daily   lisinopril-hydrochlorothiazide (PRINZIDE,ZESTORETIC) 20-25 MG per tablet Unknown Self Yes No   Sig: Take 1 tablet by mouth   meloxicam (MOBIC) 15 mg tablet Unknown  Yes No   Sig: Take 15 mg by mouth daily Not taking   methocarbamol (ROBAXIN) 500 mg tablet   No No   Sig: Take 1 tablet (500 mg total) by mouth 4 (four) times a day   Patient not taking: Reported on 4/24/2021   naproxen (NAPROSYN) 500 mg tablet 8/29/2023 Self Yes No   Sig: Take 1 tablet by mouth 2 (two) times a day as needed   naproxen (NAPROSYN) 500 mg tablet   No No   Sig: Take 1 tablet (500 mg total) by mouth 2 (two) times a day with meals As needed for mild pain   nicotine (NICODERM CQ) 21 mg/24 hr TD 24 hr patch  Self No No   Sig: Place 1 patch on the skin daily   Patient taking differently: Place 1 patch on the skin daily Not taking   omeprazole (PriLOSEC) 20 mg delayed release capsule   No No   Sig: Take 1 capsule (20 mg total) by mouth daily   Patient taking differently: Take 20 mg by mouth daily Pt not taking   ondansetron (ZOFRAN-ODT) 4 mg disintegrating tablet   No No   Sig: Take 1 tablet (4 mg total) by mouth every 6 (six) hours as needed for nausea or vomiting pantoprazole (PROTONIX) 20 mg tablet   No No   Sig: Take 1 tablet (20 mg total) by mouth daily   Patient taking differently: Take 20 mg by mouth daily Not taking   sildenafil (VIAGRA) 100 mg tablet   Yes No   Sig: Take 100 mg by mouth   sucralfate (CARAFATE) 1 g/10 mL suspension   No No   Sig: Take 10 mL (1 g total) by mouth 4 (four) times a day for 10 days   Patient taking differently: Take 1 g by mouth 4 (four) times a day Not taking      Facility-Administered Medications: None       Past Medical History:   Diagnosis Date   • Asthma    • Cardiac murmur    • COPD (chronic obstructive pulmonary disease) ([address])    • Diabetes mellitus ([address])    • Hyperlipidemia    • Hypertension        Past Surgical History:   Procedure Laterality Date   • APPENDECTOMY      teenager        Family History   Problem Relation Age of Onset   • Hypertension Mother    • Hypertension Father      I have reviewed and agree with the history as documented. E-Cigarette/Vaping   • E-Cigarette Use Never User      E-Cigarette/Vaping Substances   • Nicotine No    • THC No    • CBD No    • Flavoring No    • Other No    • Unknown No      Social History     Tobacco Use   • Smoking status: Some Days     Packs/day: 0.25     Years: 25.00     Total pack years: 6.25     Types: Cigarettes   • Smokeless tobacco: Never   Vaping Use   • Vaping Use: Never used   Substance Use Topics   • Alcohol use: Yes     Comment: occassional   • Drug use: No       Review of Systems   Constitutional: Negative for malaise/fatigue. Cardiovascular: Positive for syncope. Neurological: Negative for dizziness. Psychiatric/Behavioral: Negative for confusion. All other systems reviewed and are negative. Physical Exam  Physical Exam  Vitals and nursing note reviewed. Constitutional:       General: He is not in acute distress. Appearance: He is well-developed. He is not diaphoretic. HENT:      Head: Normocephalic and atraumatic.       Right Ear: External ear normal.      Left Ear: External ear normal.   Eyes:      General: No scleral icterus. Right eye: No discharge. Left eye: No discharge. Conjunctiva/sclera: Conjunctivae normal.   Neck:      Thyroid: No thyromegaly. Vascular: No JVD. Trachea: No tracheal deviation. Cardiovascular:      Rate and Rhythm: Normal rate and regular rhythm. Pulmonary:      Effort: Pulmonary effort is normal. No respiratory distress. Breath sounds: Normal breath sounds. No stridor. No wheezing or rales. Abdominal:      General: Bowel sounds are normal. There is no distension. Palpations: Abdomen is soft. Tenderness: There is no abdominal tenderness. Musculoskeletal:         General: No tenderness or deformity. Normal range of motion. Cervical back: Normal range of motion and neck supple. Skin:     General: Skin is warm and dry. Neurological:      Mental Status: He is alert and oriented to person, place, and time. Cranial Nerves: No cranial nerve deficit.       Coordination: Coordination normal.   Psychiatric:         Behavior: Behavior normal.         Vital Signs  ED Triage Vitals   Temperature Pulse Respirations Blood Pressure SpO2   08/31/23 1316 08/31/23 1316 08/31/23 1316 08/31/23 1316 08/31/23 1316   98.8 °F (37.1 °C) 92 18 139/77 99 %      Temp Source Heart Rate Source Patient Position - Orthostatic VS BP Location FiO2 (%)   08/31/23 1316 08/31/23 1316 08/31/23 2000 08/31/23 2000 --   Oral Monitor Lying Right arm       Pain Score       08/31/23 2115       Med Not Given for Pain - for MAR use only           Vitals:    08/31/23 1316 08/31/23 1900 08/31/23 2000 08/31/23 2051   BP: 139/77  164/94 146/81   Pulse: 92 98 98 93   Patient Position - Orthostatic VS:   Lying Lying         Visual Acuity      ED Medications  Medications   nicotine (NICODERM CQ) 14 mg/24hr TD 24 hr patch 1 patch (has no administration in time range)   ondansetron (ZOFRAN) injection 4 mg (has no administration in time range)   acetaminophen (TYLENOL) tablet 650 mg (650 mg Oral Given 8/31/23 2115)   heparin (porcine) subcutaneous injection 5,000 Units (has no administration in time range)   sodium chloride 0.9 % infusion (100 mL/hr Intravenous New Bag 8/31/23 1915)   albuterol (PROVENTIL HFA,VENTOLIN HFA) inhaler 2 puff (has no administration in time range)   ezetimibe (ZETIA) tablet 10 mg (has no administration in time range)   fenofibrate (TRICOR) tablet 145 mg (has no administration in time range)   fluticasone-vilanterol 200-25 mcg/actuation 1 puff (has no administration in time range)   gabapentin (NEURONTIN) capsule 300 mg (300 mg Oral Not Given 8/31/23 2116)   lisinopril (ZESTRIL) tablet 20 mg (has no administration in time range)   pantoprazole (PROTONIX) EC tablet 40 mg (has no administration in time range)   atorvastatin (LIPITOR) tablet 40 mg (has no administration in time range)   dextromethorphan-guaiFENesin (ROBITUSSIN DM) oral syrup 10 mL (has no administration in time range)   insulin lispro (HumaLOG) FOR PUMP REFILLS 100 Units (has no administration in time range)   PATIENT MAINTAINED INSULIN PUMP 1 each (has no administration in time range)   multi-electrolyte (ISOLYTE-S PH 7.4) bolus 2,000 mL (0 mL Intravenous Stopped 8/31/23 1843)       Diagnostic Studies  Results Reviewed     Procedure Component Value Units Date/Time    HS Troponin I 4hr [[id number]]  (Abnormal) Collected: 08/31/23 1838    Lab Status: Final result Specimen: Blood from Arm, Right Updated: 08/31/23 1911     hs TnI 4hr 82 ng/L      Delta 4hr hsTnI 20 ng/L     HS Troponin I 2hr [[id number]]  (Abnormal) Collected: 08/31/23 1633    Lab Status: Final result Specimen: Blood from Hand, Right Updated: 08/31/23 1702     hs TnI 2hr 86 ng/L      Delta 2hr hsTnI 24 ng/L     Lactic acid 2 Hours [[id number]]  (Abnormal) Collected: 08/31/23 1632    Lab Status: Final result Specimen: Blood from Hand, Right Updated: 08/31/23 1658     LACTIC ACID 2.1 mmol/L     Narrative:      Result may be elevated if tourniquet was used during collection. HS Troponin 0hr (reflex protocol) [[id number]]  (Abnormal) Collected: 08/31/23 1409    Lab Status: Final result Specimen: Blood from Arm, Right Updated: 08/31/23 1541     hs TnI 0hr 62 ng/L     TSH, 3rd generation with Free T4 reflex [[id number]]  (Abnormal) Collected: 08/31/23 1409    Lab Status: Final result Specimen: Blood from Arm, Right Updated: 08/31/23 1500     TSH 3RD GENERATON 0.270 uIU/mL     T4, free [[id number]] Collected: 08/31/23 1409    Lab Status: In process Specimen: Blood from Arm, Right Updated: 08/31/23 1500    Lactic acid, plasma (w/reflex if result > 2.0) [[id number]]  (Abnormal) Collected: 08/31/23 1409    Lab Status: Final result Specimen: Blood from Arm, Right Updated: 08/31/23 1457     LACTIC ACID 3.0 mmol/L     Narrative:      Result may be elevated if tourniquet was used during collection.     Beta Hydroxybutyrate [[id number]]  (Normal) Collected: 08/31/23 1409    Lab Status: Final result Specimen: Blood from Arm, Right Updated: 08/31/23 1448     BETA-HYDROXYBUTYRATE 0.4 mmol/L     Comprehensive metabolic panel [[id number]]  (Abnormal) Collected: 08/31/23 1409    Lab Status: Final result Specimen: Blood from Arm, Right Updated: 08/31/23 1439     Sodium 133 mmol/L      Potassium 4.0 mmol/L      Chloride 99 mmol/L      CO2 24 mmol/L      ANION GAP 10 mmol/L      BUN 14 mg/dL      Creatinine 1.08 mg/dL      Glucose 148 mg/dL      Calcium 9.3 mg/dL      AST 29 U/L      ALT 27 U/L      Alkaline Phosphatase 82 U/L      Total Protein 8.2 g/dL      Albumin 4.1 g/dL      Total Bilirubin 0.56 mg/dL      eGFR 75 ml/min/1.73sq m     Narrative:      [place] guidelines for Chronic Kidney Disease (CKD):   •  Stage 1 with normal or high GFR (GFR > 90 mL/min/1.73 square meters)  •  Stage 2 Mild CKD (GFR = 60-89 mL/min/1.73 square meters)  •  Stage 3A Moderate CKD (GFR = 45-59 mL/min/1.73 square meters)  •  Stage 3B Moderate CKD (GFR = 30-44 mL/min/1.73 square meters)  •  Stage 4 Severe CKD (GFR = 15-29 mL/min/1.73 square meters)  •  Stage 5 End Stage CKD (GFR <15 mL/min/1.73 square meters)  Note: GFR calculation is accurate only with a steady state creatinine    Protime-INR [[id number]]  (Normal) Collected: 08/31/23 1409    Lab Status: Final result Specimen: Blood from Arm, Right Updated: 08/31/23 1428     Protime 14.2 seconds      INR 1.04    Blood gas, venous [[id number]]  (Abnormal) Collected: 08/31/23 1409    Lab Status: Final result Specimen: Blood from Arm, Right Updated: 08/31/23 1422     pH, Humphrey 7.344     pCO2, Humphrey 41.6 mm Hg      pO2, Humphrey 35.2 mm Hg      HCO3, Humphrey 22.1 mmol/L      Base Excess, Humphrey -3.4 mmol/L      O2 Content, Humphrey 14.7 ml/dL      O2 HGB, VENOUS 64.1 %     CBC and differential [[id number]] Collected: 08/31/23 1409    Lab Status: Final result Specimen: Blood from Arm, Right Updated: 08/31/23 1416     WBC 8.78 Thousand/uL      RBC 5.41 Million/uL      Hemoglobin 15.5 g/dL      Hematocrit 47.9 %      MCV 89 fL      MCH 28.7 pg      MCHC 32.4 g/dL      RDW 14.8 %      MPV 9.5 fL      Platelets 821 Thousands/uL      nRBC 0 /100 WBCs      Neutrophils Relative 66 %      Immat GRANS % 0 %      Lymphocytes Relative 22 %      Monocytes Relative 12 %      Eosinophils Relative 0 %      Basophils Relative 0 %      Neutrophils Absolute 5.79 Thousands/µL      Immature Grans Absolute 0.02 Thousand/uL      Lymphocytes Absolute 1.91 Thousands/µL      Monocytes Absolute 1.03 Thousand/µL      Eosinophils Absolute 0.01 Thousand/µL      Basophils Absolute 0.02 Thousands/µL     UA w Reflex to Microscopic w Reflex to Culture [[id number]]     Lab Status: No result Specimen: Urine                  XR chest 2 views   Final Result by Axel Douglass MD (08/31 0695)      No focal consolidation, pleural effusion, or pneumothorax.                   Resident: matias Giron attending radiologist, have reviewed the images and agree with the final report above. Workstation performed: [id number]                    Procedures  Procedures         ED Course                                             Medical Decision Making  Elevated troponin: acute illness or injury  Syncope: acute illness or injury  Amount and/or Complexity of Data Reviewed  Labs: ordered. Radiology: ordered. Risk  Prescription drug management. Decision regarding hospitalization. Disposition  Final diagnoses:   Syncope   Elevated troponin     Time reflects when diagnosis was documented in both MDM as applicable and the Disposition within this note     Time User Action Codes Description Comment    8/31/2023  6:18 PM Marzette Waldron Add [R55] Syncope     8/31/2023  6:18 PM Marzette Waldron Add [R77.8] Elevated troponin       ED Disposition     ED Disposition   Admit    Condition   Stable    Date/Time   Thu Aug 31, 2023  6:19 PM    Comment   Case was discussed with Brandon Ray and the patient's admission status was agreed to be Admission Status: observation status to the service of Dr. Charles Grier .            Follow-up Information    None         Current Discharge Medication List      CONTINUE these medications which have NOT CHANGED    Details   atorvastatin (LIPITOR) 40 mg tablet TAKE 1 TABLET BY MOUTH EVERY DAY AT NIGHT  Refills: 3      chlorhexidine (PERIDEX) 0.12 % solution Apply 15 mL to the mouth or throat 2 (two) times a day Swish for one minute and spit  Qty: 473 mL, Refills: 0    Associated Diagnoses: Dental infection      albuterol (PROVENTIL HFA,VENTOLIN HFA) 90 mcg/act inhaler Inhale 2 puffs every 4 (four) hours as needed for wheezing  Qty: 1 Inhaler, Refills: 0    Associated Diagnoses: Asthma exacerbation      budesonide (PULMICORT) 0.25 mg/2 mL nebulizer solution Inhale 0.25 mg      dicyclomine (BENTYL) 20 mg tablet Take 1 tablet (20 mg total) by mouth 2 (two) times a day for 7 days  Qty: 14 tablet, Refills: 0    Associated Diagnoses: Periumbilical abdominal pain      ezetimibe (ZETIA) 10 mg tablet Take 10 mg by mouth daily      fenofibrate (TRIGLIDE) 160 MG tablet Take 160 mg by mouth daily Not taking      fluticasone-salmeterol (Advair HFA) 115-21 MCG/ACT inhaler Inhale 2 puffs 2 (two) times a day      insulin lispro protamine-insulin lispro (HumaLOG 75/25) 100 units/mL Inject 15 Units under the skin       Insulin Pen Needle (PEN NEEDLES 29GX1/2") 29G X 12MM MISC 1 Device by Does not apply route      Insulin Syringe-Needle U-100 31G X 1/4" 0.5 ML MISC 1 Syringe by Does not apply route      Lancet Devices (LANCING DEVICE) MISC Inject 1 Device under the skin      lisinopril (ZESTRIL) 20 mg tablet Take 20 mg by mouth daily      lisinopril-hydrochlorothiazide (PRINZIDE,ZESTORETIC) 20-25 MG per tablet Take 1 tablet by mouth      meloxicam (MOBIC) 15 mg tablet Take 15 mg by mouth daily Not taking      methocarbamol (ROBAXIN) 500 mg tablet Take 1 tablet (500 mg total) by mouth 4 (four) times a day  Qty: 40 tablet, Refills: 0    Associated Diagnoses: Neck muscle spasm      !! naproxen (NAPROSYN) 500 mg tablet Take 1 tablet by mouth 2 (two) times a day as needed      !! naproxen (NAPROSYN) 500 mg tablet Take 1 tablet (500 mg total) by mouth 2 (two) times a day with meals As needed for mild pain  Qty: 20 tablet, Refills: 0    Associated Diagnoses: Dental infection      nicotine (NICODERM CQ) 21 mg/24 hr TD 24 hr patch Place 1 patch on the skin daily  Qty: 28 patch, Refills: 0      omeprazole (PriLOSEC) 20 mg delayed release capsule Take 1 capsule (20 mg total) by mouth daily  Qty: 30 capsule, Refills: 0    Associated Diagnoses: Esophagitis      ondansetron (ZOFRAN-ODT) 4 mg disintegrating tablet Take 1 tablet (4 mg total) by mouth every 6 (six) hours as needed for nausea or vomiting  Qty: 12 tablet, Refills: 0    Associated Diagnoses: Pancreatitis      pantoprazole (PROTONIX) 20 mg tablet Take 1 tablet (20 mg total) by mouth daily  Qty: 20 tablet, Refills: 0    Associated Diagnoses: Epigastric abdominal pain      sildenafil (VIAGRA) 100 mg tablet Take 100 mg by mouth      sucralfate (CARAFATE) 1 g/10 mL suspension Take 10 mL (1 g total) by mouth 4 (four) times a day for 10 days  Qty: 400 mL, Refills: 0    Associated Diagnoses: Pancreatitis       ! ! - Potential duplicate medications found. Please discuss with provider. STOP taking these medications       fluticasone-vilanterol (BREO ELLIPTA) 200-25 MCG/INH inhaler Comments:   Reason for Stopping:         gabapentin (NEURONTIN) 300 mg capsule Comments:   Reason for Stopping:               No discharge procedures on file.     PDMP Review       Value Time User    PDMP Reviewed  Yes 9/22/2022  9:10 PM Johana Crane PA-C          ED Provider  Electronically Signed by           Anita Maradiaga DO  08/31/23 4159

## 2023-09-01 NOTE — UTILIZATION REVIEW
Initial Clinical Review    Admission: Date/Time/Statement:  8/31/23 1819 Observation AND CHANGED 9/1/23 1223 INPATIENT DUE TO SYNCOPE WITH UNCLEAR ETIOLOGY, TO CONTINUE TELEMETRY, CONSULT CARDIOLOGY, ECHO/INTERMITTENT FEVERS AND PENDING LYME AND CULTURES. Admission Orders (From admission, onward)     Ordered        09/01/23 1223  Inpatient Admission  Once                      Orders Placed This Encounter   Procedures   • Inpatient Admission     Standing Status:   Standing     Number of Occurrences:   1     Order Specific Question:   Level of Care     Answer:   Med Surg [16]     Order Specific Question:   Estimated length of stay     Answer:   More than 2 Midnights     Order Specific Question:   Certification     Answer:   I certify that inpatient services are medically necessary for this patient for a duration of greater than two midnights. See H&P and MD Progress Notes for additional information about the patient's course of treatment. ED Arrival Information     Expected   -    Arrival   8/31/2023 13:14    Acuity   Urgent            Means of arrival   Ambulance    Escorted by   [facility]    Service   Hospitalist    Admission type   Emergency            Arrival complaint   SYNCOPE           Chief Complaint   Patient presents with   • Syncope     Pt came in ems reporting he passed out earlier today. Pt has not been feeling well the last 3 days. Initial Presentation: 63 y.o. male from home to ED via ems admitted to observation [address]  due to Syncope/Elevated troponin. PMH of DM2, hdl and hpt. Presented due to syncopal episode, feeling dizzy, lightheaded, was lowered to ground, passed out short time starting few hours ago, for last 3 days has not felt well. Exam non focal.   Lactic acid 3. Hs Tnl 62> 86/24> 82/20. Glucose 148. In the ED given 2 liters of IVF. Plan is continue IVF, check orthostatics, telemetry. Echo. Continue home insulin. Hold home metformin. 9/1/23 CHANGED TO INPATIENT:  Syncope with differential of  cardiogenic   vs neurogenic vs orthostatic hypotension vs hypoglycemia/ SIRS & suspect viral etiology/elevated troponin. Overnight febrile to 102, this am low grade fever to 99.2- 100.4. States family member with GI bug. On exam no focal deficits. Na 134. Lactic acid 0.7. Continue telemetry, echo and Cardiology consult in process. Check orthostatics. Continue home antihypertensives. Monitor off antibiotics. Follow-up with Lyme titers, HIV, hep C, 2 sets of blood cultures. Continue asa and statin. Home metformin on hold and home insulin continues. 9/2/23  Intermittently febrile. Max temp 100.6. Telemetry sinus. Blood cultures done 9/1 in process. Parasite smear ordered, lyme in process. Continue telemetry, IVF    9/2/23 per cardiology- patient with syncope/SIRS/nonischemic myocardial injury due to SIRS and viral infection. PMH of hpt, hyperlipidemia, poorly controlled DM, tobacco abuse. .   Plan is check orthostatics. Interrogate loop recorder. Telemetry.        ED Triage Vitals   Temperature Pulse Respirations Blood Pressure SpO2   08/31/23 1316 08/31/23 1316 08/31/23 1316 08/31/23 1316 08/31/23 1316   98.8 °F (37.1 °C) 92 18 139/77 99 %      Temp Source Heart Rate Source Patient Position - Orthostatic VS BP Location FiO2 (%)   08/31/23 1316 08/31/23 1316 08/31/23 2000 08/31/23 2000 --   Oral Monitor Lying Right arm       Pain Score       08/31/23 2115       Med Not Given for Pain - for MAR use only          Wt Readings from Last 1 Encounters:   09/02/23 84.7 kg (186 lb 12.8 oz)     Additional Vital Signs:   09/02/23 09:11:52 100.6 °F (38.1 °C) Abnormal  -- -- -- -- -- -- --   09/02/23 0700 99.7 °F (37.6 °C) 85 -- 152/86 108 95 % None (Room air) --   09/02/23 0240 99.9 °F (37.7 °C) 89 18 149/90 110 96 % -- --   09/01/23 2103 99.8 °F (37.7 °C) 80 16 154/87 109 98 % None (Room air) Lying   09/01/23 1900 99.5 °F (37.5 °C) 82 18 127/74 92 97 % -- --   09/01/23 1700 99.4 °F (37.4 °C) -- -- -- -- -- -- --   09/01/23 1458 100.7 °F (38.2 °C) Abnormal  90 -- 150/82 105 95 % None (Room air)      09/01/23 0700 99.2 °F (37.3 °C) 87 -- 152/82 105 97 % None (Room air) Lying   09/01/23 0229 100.7 °F (38.2 °C) Abnormal  85 18 152/80 104 96 % None (Room air) Lying   08/31/23 20:51:58 102 °F (38.9 °C) Abnormal  93 18 146/81 103 94 % None (Room air) Lying   08/31/23 2000 -- 98 18 164/94 120 97 % None (Room air) Lying   08/31/23 1900 -- 98 -- -- -- 98 % None (Room air)      08/31/23 20:51:58 102 °F (38.9 °C) Abnormal  93 18 146/81 103 94 % None (Room air     Pertinent Labs/Diagnostic Test Results:   XR chest 2 views   Final Result by Dakotah Miramontes MD (08/31 1515)      No focal consolidation, pleural effusion, or pneumothorax. Resident: Argelia Spann, the attending radiologist, have reviewed the images and agree with the final report above.       Workstation performed: [id number]           8/31/23 ecg Normal sinus rhythm   Rightward axis     Results from last 7 days   Lab Units 08/31/23  2305   SARS-COV-2  Negative     Results from last 7 days   Lab Units 09/02/23  0516 09/01/23  0457 08/31/23  1409   WBC Thousand/uL 5.44 5.62 8.78   HEMOGLOBIN g/dL 12.3 14.0 15.5   HEMATOCRIT % 37.1 42.3 47.9   PLATELETS Thousands/uL 192 197 221   NEUTROS ABS Thousands/µL  --  3.44 5.79     Results from last 7 days   Lab Units 09/02/23  0516 09/01/23  0457 08/31/23  1409   SODIUM mmol/L 133* 134* 133*   POTASSIUM mmol/L 3.4* 3.5 4.0   CHLORIDE mmol/L 104 102 99   CO2 mmol/L 22 26 24   ANION GAP mmol/L 7 6 10   BUN mg/dL 8 13 14   CREATININE mg/dL 0.69 0.81 1.08   EGFR ml/min/1.73sq m 105 98 75   CALCIUM mg/dL 7.8* 8.8 9.3     Results from last 7 days   Lab Units 08/31/23  1409   AST U/L 29   ALT U/L 27   ALK PHOS U/L 82   TOTAL PROTEIN g/dL 8.2   ALBUMIN g/dL 4.1   TOTAL BILIRUBIN mg/dL 0.56     Results from last 7 days   Lab Units 09/02/23  0710 09/01/23  2037 09/01/23  1718 09/01/23  1203 09/01/23  0718 08/31/23  2102   POC GLUCOSE mg/dl 132 141* 147* 125 124 139     Results from last 7 days   Lab Units 09/02/23  0516 09/01/23  0457 08/31/23  1409   GLUCOSE RANDOM mg/dL 128 129 148*     BETA-HYDROXYBUTYRATE   Date Value Ref Range Status   08/31/2023 0.4 <0.6 mmol/L Final   06/19/2021 0.1 <0.6 mmol/L Final      Results from last 7 days   Lab Units 08/31/23  1409   PH JANUARY  7.344   PCO2 JANUARY mm Hg 41.6*   PO2 JANUARY mm Hg 35.2   HCO3 JANUARY mmol/L 22.1*   BASE EXC JANUARY mmol/L -3.4   O2 CONTENT JANUARY ml/dL 14.7   O2 HGB, VENOUS % 64.1     Results from last 7 days   Lab Units 08/31/23  1838 08/31/23  1633 08/31/23  1409   HS TNI 0HR ng/L  --   --  62*   HS TNI 2HR ng/L  --  86*  --    HSTNI D2 ng/L  --  24*  --    HS TNI 4HR ng/L 82*  --   --    HSTNI D4 ng/L 20*  --   --      Results from last 7 days   Lab Units 08/31/23  1409   PROTIME seconds 14.2   INR  1.04     Results from last 7 days   Lab Units 08/31/23  1409   TSH 3RD GENERATON uIU/mL 0.270*     Results from last 7 days   Lab Units 09/01/23  1109 08/31/23  1632 08/31/23  1409   LACTIC ACID mmol/L 0.7 2.1* 3.0*     Results from last 7 days   Lab Units 08/31/23  2305   INFLUENZA A PCR  Negative   INFLUENZA B PCR  Negative   RSV PCR  Negative       ED Treatment:   Medication Administration from 08/31/2023 1314 to 08/31/2023 2043       Date/Time Order Dose Route Action Comments     08/31/2023 1406 EDT multi-electrolyte (ISOLYTE-S PH 7.4) bolus 2,000 mL 2,000 mL Intravenous New Bag --     08/31/2023 1915 EDT sodium chloride 0.9 % infusion 100 mL/hr Intravenous New Bag --        Past Medical History:   Diagnosis Date   • Asthma    • Cardiac murmur    • COPD (chronic obstructive pulmonary disease) ([address])    • Diabetes mellitus ([address])    • Hyperlipidemia    • Hypertension      Present on Admission:  • Syncope  • Hypertension  • Tobacco use  • Type 2 diabetes mellitus ([address])  • Hyperlipidemia      Admitting Diagnosis: Syncope [R55]  Elevated troponin [R77.8]  Age/Sex: 63 y.o. male  Admission Orders:  Scheduled Medications:  atorvastatin, 40 mg, Oral, Daily With Dinner  ezetimibe, 10 mg, Oral, Daily  fenofibrate, 145 mg, Oral, Daily  fluticasone-vilanterol, 1 puff, Inhalation, Daily  gabapentin, 300 mg, Oral, TID  heparin (porcine), 5,000 Units, Subcutaneous, Q8H MARIO  lisinopril, 20 mg, Oral, Daily  nicotine, 1 patch, Transdermal, Daily  pantoprazole, 40 mg, Oral, Early Morning  patient maintained insulin pump, 1 each, Subcutaneous, Q8H      Continuous IV Infusions:  sodium chloride, 100 mL/hr, Intravenous, Continuous      PRN Meds:  acetaminophen, 650 mg, Oral, Q6H PRN x 1 8/31, x 2 9/1, x 1 9/2  albuterol, 2 puff, Inhalation, Q4H PRN  dextromethorphan-guaiFENesin, 10 mL, Oral, Q6H PRN  insulin lispro, 100 Units, Subcutaneous Insulin Pump, Daily PRN  ondansetron, 4 mg, Intravenous, Q6H PRN  pneumococcal 20-leslie conj vacc, 0.5 mL, Intramuscular, Once PRN    Telemetry     IP CONSULT TO CARDIOLOGY    Network Utilization Review Department  ATTENTION: Please call with any questions or concerns to [phone] and carefully listen to the prompts so that you are directed to the right person. All voicemails are confidential.  Pipo Montejo all requests for admission clinical reviews, approved or denied determinations and any other requests to dedicated fax number below belonging to the campus where the patient is receiving treatment.  List of dedicated fax numbers for the Facilities:  [place] DENIALS (Administrative/Medical Necessity) [phone]   [address] [facility] (Maternity/NICU/Pediatrics) [phone] [address] [facility] [phone]   [facility] [address] [phone]   [address] [address]  [phone]

## 2023-09-01 NOTE — PLAN OF CARE
Problem: CARDIOVASCULAR - ADULT  Goal: Maintains optimal cardiac output and hemodynamic stability  Description: INTERVENTIONS:  - Monitor I/O, vital signs and rhythm  - Monitor for S/S and trends of decreased cardiac output  - Administer and titrate ordered vasoactive medications to optimize hemodynamic stability  - Assess quality of pulses, skin color and temperature  - Assess for signs of decreased coronary artery perfusion  - Instruct patient to report change in severity of symptoms  Outcome: Progressing  Goal: Absence of cardiac dysrhythmias or at baseline rhythm  Description: INTERVENTIONS:  - Continuous cardiac monitoring, vital signs, obtain 12 lead EKG if ordered  - Administer antiarrhythmic and heart rate control medications as ordered  - Monitor electrolytes and administer replacement therapy as ordered  Outcome: Progressing     Problem: PAIN - ADULT  Goal: Verbalizes/displays adequate comfort level or baseline comfort level  Description: Interventions:  - Encourage patient to monitor pain and request assistance  - Assess pain using appropriate pain scale  - Administer analgesics based on type and severity of pain and evaluate response  - Implement non-pharmacological measures as appropriate and evaluate response  - Consider cultural and social influences on pain and pain management  - Notify physician/advanced practitioner if interventions unsuccessful or patient reports new pain  Outcome: Progressing     Problem: INFECTION - ADULT  Goal: Absence or prevention of progression during hospitalization  Description: INTERVENTIONS:  - Assess and monitor for signs and symptoms of infection  - Monitor lab/diagnostic results  - Monitor all insertion sites, i.e. indwelling lines, tubes, and drains  - Monitor endotracheal if appropriate and nasal secretions for changes in amount and color  - [place] appropriate cooling/warming therapies per order  - Administer medications as ordered  - Instruct and encourage patient and family to use good hand hygiene technique  - Identify and instruct in appropriate isolation precautions for identified infection/condition  Outcome: Progressing  Goal: Absence of fever/infection during neutropenic period  Description: INTERVENTIONS:  - Monitor WBC    Outcome: Progressing     Problem: SAFETY ADULT  Goal: Patient will remain free of falls  Description: INTERVENTIONS:  - Educate patient/family on patient safety including physical limitations  - Instruct patient to call for assistance with activity   - Consult OT/PT to assist with strengthening/mobility   - Keep Call bell within reach  - Keep bed low and locked with side rails adjusted as appropriate  - Keep care items and personal belongings within reach  - Initiate and maintain comfort rounds  - Make Fall Risk Sign visible to staff  - Offer Toileting every 2 Hours, in advance of need  - Initiate/Maintain bed alarm  - Apply yellow socks and bracelet for high fall risk patients  - Consider moving patient to room near nurses station  Outcome: Progressing  Goal: Maintain or return to baseline ADL function  Description: INTERVENTIONS:  -  Assess patient's ability to carry out ADLs; assess patient's baseline for ADL function and identify physical deficits which impact ability to perform ADLs (bathing, care of mouth/teeth, toileting, grooming, dressing, etc.)  - Assess/evaluate cause of self-care deficits   - Assess range of motion  - Assess patient's mobility; develop plan if impaired  - Assess patient's need for assistive devices and provide as appropriate  - Encourage maximum independence but intervene and supervise when necessary  - Involve family in performance of ADLs  - Assess for home care needs following discharge   - Consider OT consult to assist with ADL evaluation and planning for discharge  - Provide patient education as appropriate  Outcome: Progressing  Goal: Maintains/Returns to pre admission functional level  Description: INTERVENTIONS:  - Perform BMAT or MOVE assessment daily.   - Set and communicate daily mobility goal to care team and patient/family/caregiver. - Collaborate with rehabilitation services on mobility goals if consulted  - Perform Range of Motion 4  times a day. - Reposition patient every 2 hours. - Dangle patient 3 times a day  - Stand patient 3 times a day  - Ambulate patient 4 times a day  - Out of bed to chair 3 times a day   - Out of bed for meals 3 times a day  - Out of bed for toileting  - Record patient progress and toleration of activity level   Outcome: Progressing     Problem: DISCHARGE PLANNING  Goal: Discharge to home or other facility with appropriate resources  Description: INTERVENTIONS:  - Identify barriers to discharge w/patient and caregiver  - Arrange for needed discharge resources and transportation as appropriate  - Identify discharge learning needs (meds, wound care, etc.)  - Arrange for interpretive services to assist at discharge as needed  - Refer to Case Management Department for coordinating discharge planning if the patient needs post-hospital services based on physician/advanced practitioner order or complex needs related to functional status, cognitive ability, or social support system  Outcome: Progressing     Problem: Knowledge Deficit  Goal: Patient/family/caregiver demonstrates understanding of disease process, treatment plan, medications, and discharge instructions  Description: Complete learning assessment and assess knowledge base.   Interventions:  - Provide teaching at level of understanding  - Provide teaching via preferred learning methods  Outcome: Progressing

## 2023-09-01 NOTE — PROGRESS NOTES
[address]  Progress Note  Name: Harshad Ambrose  MRN: [id number]  Unit/Bed#: -01 I Date of Admission: 8/31/2023   Date of Service: 9/1/2023 I Hospital Day: 0    Assessment/Plan   * Syncope  Assessment & Plan   report having history of syncope approximately 2 years ago and still has loop recorder in place. Patient reports that he was told he had some pauses but not concerning as per his primary cardiology which has now moved from [facility] to [facility] and he has not had follow-up. Patient reports that he was sitting on a swing with the wife and suddenly started feeling lightheadedness, and felt he was going to pass out and subsequently lost consciousness without fall or head strike. Noted with episode of fever. CBC, CMP grossly within normal limits. HS troponin mildly elevated however flat. EKG normal sinus rhythm. Chest x-ray pulm negative for acute finding. Lactic acidosis resolved    Unclear etiology. Differential diagnosis includes cardiogenic vs neurogenic vs orthostatic hypotension vs hypoglycemia. 2D echo report pending. Continue telemetry monitoring. Follow-up with cardiology consult  Follow-up with orthostatic vitals. SIRS (systemic inflammatory response syndrome) (HCC)  Assessment & Plan  Noted with tachycardia, fever, lactic acidosis. Lactic acidosis has now resolved  Patient reports his mother-in-law was ill with GI bug and patient was also not feeling well 3 days prior to presentation with episodes of fever and chills. Suspected viral etiology. Currently patient does report having fever intermittently, denies chills, myalgia, nausea, vomiting, cough, rhinorrhea, headache, visual disturbance, dysuria, diarrhea, any other new complaints. Denies skin rash, tick bites or bug bites. Follow-up with Lyme titers, HIV, hep C, 2 sets of blood cultures. Clinically patient appears nontoxic and hemodynamically stable.   Continue to monitor off antibiotics for now. Low threshold to initiate broad-spectrum antibiotics if noted with worsening signs of infection. Elevated troponin  Assessment & Plan  Troponin noted to 62, 86, 90  Currently without chest pain or shortness of breath  Denies any history of CAD in the past.  2D echo pending. Start on aspirin 81 mg daily, continue statin. Tobacco use  Assessment & Plan  Smoking cessation counseling provided    Type 2 diabetes mellitus ([address])  Assessment & Plan  Lab Results   Component Value Date    HGBA1C 9.3 (H) 08/01/2023       No results for input(s): "POCGLU" in the last 72 hours. Blood Sugar Average: Last 72 hrs:     A1c noted to be 9.3  We will continue with insulin regiment  Hold home metformin    Hyperlipidemia  Assessment & Plan  Continue statin    Hypertension  Assessment & Plan  Blood pressure currently controlled  Continue home BP medications             VTE Pharmacologic Prophylaxis: VTE Score: 5 Moderate Risk (Score 3-4) - Pharmacological DVT Prophylaxis Ordered: heparin. Patient Centered Rounds: I performed bedside rounds with nursing staff today. Education and Discussions with Family / Patient: Updated  (significant other) via phone. Current Length of Stay: 0 day(s)  Current Patient Status: Inpatient   Certification Statement: The patient will continue to require additional inpatient hospital stay due to Syncopal episode, febrile episodes. Discharge Plan: Anticipate discharge in 24-48 hrs to home. Code Status: Level 1 - Full Code    Subjective:     Noted with high-grade fever last night, noted with low-grade fever this morning. Patient does report his mother-in-law was ill with GI bug. Patient was also not feeling well prior to syncopal episode and reports she was sick for 3 days with fever, chills, nausea. Currently patient denies chest pain, dyspnea, cough, rhinorrhea, headache, visual dizziness, abdominal pain, dysuria, diarrhea.   Patient denies skin rash, tick bites or bug bites. Denies any recent travel history. No other events reported. Objective:     Vitals:   Temp (24hrs), Av.2 °F (37.9 °C), Min:98.8 °F (37.1 °C), Max:102 °F (38.9 °C)    Temp:  [98.8 °F (37.1 °C)-102 °F (38.9 °C)] 100.4 °F (38 °C)  HR:  [85-98] 91  Resp:  [18] 18  BP: (139-164)/(77-94) 155/83  SpO2:  [94 %-99 %] 96 %  Body mass index is 25.37 kg/m². Input and Output Summary (last 24 hours): Intake/Output Summary (Last 24 hours) at 2023 1248  Last data filed at 2023 0700  Gross per 24 hour   Intake 360 ml   Output 300 ml   Net 60 ml       Physical Exam:   Physical Exam  Constitutional:       General: He is not in acute distress. Appearance: Normal appearance. He is normal weight. He is not ill-appearing or toxic-appearing. HENT:      Head: Normocephalic and atraumatic. Eyes:      Pupils: Pupils are equal, round, and reactive to light. Cardiovascular:      Rate and Rhythm: Normal rate. Pulses: Normal pulses. Pulmonary:      Effort: Pulmonary effort is normal. No respiratory distress. Breath sounds: Normal breath sounds. No wheezing. Abdominal:      General: Bowel sounds are normal. There is no distension. Palpations: Abdomen is soft. Tenderness: There is no abdominal tenderness. Musculoskeletal:      Cervical back: No rigidity. Right lower leg: No edema. Left lower leg: No edema. Neurological:      Mental Status: He is alert and oriented to person, place, and time. Mental status is at baseline.    Psychiatric:         Mood and Affect: Mood normal.         Behavior: Behavior normal.          Additional Data:     Labs:  Results from last 7 days   Lab Units 23  0457   WBC Thousand/uL 5.62   HEMOGLOBIN g/dL 14.0   HEMATOCRIT % 42.3   PLATELETS Thousands/uL 197   NEUTROS PCT % 61   LYMPHS PCT % 27   MONOS PCT % 11   EOS PCT % 1     Results from last 7 days   Lab Units 237 23  1409   SODIUM mmol/L 134* 133*   POTASSIUM mmol/L 3.5 4.0   CHLORIDE mmol/L 102 99   CO2 mmol/L 26 24   BUN mg/dL 13 14   CREATININE mg/dL 0.81 1.08   ANION GAP mmol/L 6 10   CALCIUM mg/dL 8.8 9.3   ALBUMIN g/dL  --  4.1   TOTAL BILIRUBIN mg/dL  --  0.56   ALK PHOS U/L  --  82   ALT U/L  --  27   AST U/L  --  29   GLUCOSE RANDOM mg/dL 129 148*     Results from last 7 days   Lab Units 08/31/23  1409   INR  1.04     Results from last 7 days   Lab Units 09/01/23  1203 09/01/23  0718 08/31/23  2102   POC GLUCOSE mg/dl 125 124 139         Results from last 7 days   Lab Units 09/01/23  1109 08/31/23  1632 08/31/23  1409   LACTIC ACID mmol/L 0.7 2.1* 3.0*       Lines/Drains:  Invasive Devices     Peripheral Intravenous Line  Duration           Peripheral IV 08/31/23 Left;Proximal;Ventral (anterior) Forearm 1 day    Peripheral IV 08/31/23 Proximal;Right;Ventral (anterior) Forearm <1 day                   Telemetry:  Telemetry Orders (From admission, onward)             24 Hour Telemetry Monitoring  Continuous x 24 Hours (Telem)        Question:  Reason for 24 Hour Telemetry  Answer:  Syncope suspected to be cardiac in origin                 Telemetry Reviewed: Normal Sinus Rhythm  Indication for Continued Telemetry Use: Syncope        Recent Cultures (last 7 days):         Last 24 Hours Medication List:   Current Facility-Administered Medications   Medication Dose Route Frequency Provider Last Rate   • acetaminophen  650 mg Oral Q6H PRN Cory Taylor MD     • albuterol  2 puff Inhalation Q4H PRN Cory Taylor MD     • aspirin  81 mg Oral Daily Ba MENJIVAR MD     • atorvastatin  40 mg Oral Daily With Xavi Perez MD     • dextromethorphan-guaiFENesin  10 mL Oral Q6H PRN Nikki Celaya PA-C     • ezetimibe  10 mg Oral Daily Cory Taylor MD     • fenofibrate  145 mg Oral Daily Cory Taylor MD     • fluticasone-vilanterol  1 puff Inhalation Daily Cory Taylor MD     • gabapentin  300 mg Oral TID Cory Taylor MD     • heparin (porcine) 5,000 Units Subcutaneous Q8H [facility] Cory Taylor MD     • insulin lispro  100 Units Subcutaneous Insulin Pump Daily PRN Mortimer Brilliant, PA-C     • lisinopril  20 mg Oral Daily Cory Taylor MD     • nicotine  1 patch Transdermal Daily Cory Taylor MD     • ondansetron  4 mg Intravenous Q6H PRN Cory Taylor MD     • pantoprazole  40 mg Oral Early Morning Cory Taylor MD     • patient maintained insulin pump  1 each Subcutaneous Q8H Aria Hernandez PA-C     • pneumococcal 20-leslie conj vacc  0.5 mL Intramuscular Once PRN Tanya Anderson MD     • sodium chloride  100 mL/hr Intravenous Continuous Cory Taylor  mL/hr (08/31/23 1915)        Today, Patient Was Seen By: Marjorie Allen MD    **Please Note: This note may have been constructed using a voice recognition system. **

## 2023-09-02 LAB
ANION GAP SERPL CALCULATED.3IONS-SCNC: 7 MMOL/L
B BURGDOR IGG+IGM SER QL IA: NEGATIVE
BACTERIA UR QL AUTO: ABNORMAL /HPF
BILIRUB UR QL STRIP: NEGATIVE
BUN SERPL-MCNC: 8 MG/DL (ref 5–25)
CALCIUM SERPL-MCNC: 7.8 MG/DL (ref 8.4–10.2)
CHLORIDE SERPL-SCNC: 104 MMOL/L (ref 96–108)
CLARITY UR: CLEAR
CO2 SERPL-SCNC: 22 MMOL/L (ref 21–32)
COLOR UR: YELLOW
CREAT SERPL-MCNC: 0.69 MG/DL (ref 0.6–1.3)
ERYTHROCYTE [DISTWIDTH] IN BLOOD BY AUTOMATED COUNT: 14.3 % (ref 11.6–15.1)
GFR SERPL CREATININE-BSD FRML MDRD: 105 ML/MIN/1.73SQ M
GLUCOSE SERPL-MCNC: 128 MG/DL (ref 65–140)
GLUCOSE SERPL-MCNC: 132 MG/DL (ref 65–140)
GLUCOSE SERPL-MCNC: 172 MG/DL (ref 65–140)
GLUCOSE SERPL-MCNC: 183 MG/DL (ref 65–140)
GLUCOSE UR STRIP-MCNC: ABNORMAL MG/DL
HCT VFR BLD AUTO: 37.1 % (ref 36.5–49.3)
HGB BLD-MCNC: 12.3 G/DL (ref 12–17)
HGB UR QL STRIP.AUTO: NEGATIVE
KETONES UR STRIP-MCNC: ABNORMAL MG/DL
LEUKOCYTE ESTERASE UR QL STRIP: ABNORMAL
MCH RBC QN AUTO: 28.4 PG (ref 26.8–34.3)
MCHC RBC AUTO-ENTMCNC: 33.2 G/DL (ref 31.4–37.4)
MCV RBC AUTO: 86 FL (ref 82–98)
NITRITE UR QL STRIP: NEGATIVE
NON-SQ EPI CELLS URNS QL MICRO: ABNORMAL /HPF
PH UR STRIP.AUTO: 6.5 [PH]
PLATELET # BLD AUTO: 192 THOUSANDS/UL (ref 149–390)
PMV BLD AUTO: 9.4 FL (ref 8.9–12.7)
POTASSIUM SERPL-SCNC: 3.4 MMOL/L (ref 3.5–5.3)
PROT UR STRIP-MCNC: ABNORMAL MG/DL
RBC # BLD AUTO: 4.33 MILLION/UL (ref 3.88–5.62)
RBC #/AREA URNS AUTO: ABNORMAL /HPF
SODIUM SERPL-SCNC: 133 MMOL/L (ref 135–147)
SP GR UR STRIP.AUTO: 1.02 (ref 1–1.03)
UROBILINOGEN UR STRIP-ACNC: 2 MG/DL
WBC # BLD AUTO: 5.44 THOUSAND/UL (ref 4.31–10.16)
WBC #/AREA URNS AUTO: ABNORMAL /HPF

## 2023-09-02 PROCEDURE — 85027 COMPLETE CBC AUTOMATED: CPT | Performed by: STUDENT IN AN ORGANIZED HEALTH CARE EDUCATION/TRAINING PROGRAM

## 2023-09-02 PROCEDURE — 80048 BASIC METABOLIC PNL TOTAL CA: CPT | Performed by: STUDENT IN AN ORGANIZED HEALTH CARE EDUCATION/TRAINING PROGRAM

## 2023-09-02 PROCEDURE — 99223 1ST HOSP IP/OBS HIGH 75: CPT | Performed by: INTERNAL MEDICINE

## 2023-09-02 PROCEDURE — 82948 REAGENT STRIP/BLOOD GLUCOSE: CPT

## 2023-09-02 PROCEDURE — 99233 SBSQ HOSP IP/OBS HIGH 50: CPT | Performed by: INTERNAL MEDICINE

## 2023-09-02 PROCEDURE — 86618 LYME DISEASE ANTIBODY: CPT | Performed by: PHYSICIAN ASSISTANT

## 2023-09-02 RX ORDER — DOXYCYCLINE HYCLATE 100 MG/1
100 CAPSULE ORAL EVERY 12 HOURS SCHEDULED
Status: DISCONTINUED | OUTPATIENT
Start: 2023-09-02 | End: 2023-09-03 | Stop reason: HOSPADM

## 2023-09-02 RX ORDER — POTASSIUM CHLORIDE 20 MEQ/1
40 TABLET, EXTENDED RELEASE ORAL ONCE
Status: COMPLETED | OUTPATIENT
Start: 2023-09-02 | End: 2023-09-02

## 2023-09-02 RX ADMIN — PANTOPRAZOLE SODIUM 40 MG: 40 TABLET, DELAYED RELEASE ORAL at 05:15

## 2023-09-02 RX ADMIN — HEPARIN SODIUM 5000 UNITS: 5000 INJECTION INTRAVENOUS; SUBCUTANEOUS at 05:15

## 2023-09-02 RX ADMIN — HEPARIN SODIUM 5000 UNITS: 5000 INJECTION INTRAVENOUS; SUBCUTANEOUS at 21:04

## 2023-09-02 RX ADMIN — ASPIRIN 81 MG: 81 TABLET, COATED ORAL at 09:13

## 2023-09-02 RX ADMIN — POTASSIUM CHLORIDE 40 MEQ: 1500 TABLET, EXTENDED RELEASE ORAL at 09:13

## 2023-09-02 RX ADMIN — DOXYCYCLINE HYCLATE 100 MG: 100 CAPSULE ORAL at 21:03

## 2023-09-02 RX ADMIN — ACETAMINOPHEN 650 MG: 325 TABLET, FILM COATED ORAL at 19:53

## 2023-09-02 RX ADMIN — HEPARIN SODIUM 5000 UNITS: 5000 INJECTION INTRAVENOUS; SUBCUTANEOUS at 14:26

## 2023-09-02 RX ADMIN — SODIUM CHLORIDE 100 ML/HR: 0.9 INJECTION, SOLUTION INTRAVENOUS at 05:15

## 2023-09-02 RX ADMIN — ATORVASTATIN CALCIUM 40 MG: 40 TABLET, FILM COATED ORAL at 17:13

## 2023-09-02 RX ADMIN — NICOTINE 1 PATCH: 14 PATCH, EXTENDED RELEASE TRANSDERMAL at 09:13

## 2023-09-02 RX ADMIN — EZETIMIBE 10 MG: 10 TABLET ORAL at 09:13

## 2023-09-02 RX ADMIN — LISINOPRIL 20 MG: 20 TABLET ORAL at 09:13

## 2023-09-02 RX ADMIN — ACETAMINOPHEN 650 MG: 325 TABLET, FILM COATED ORAL at 09:15

## 2023-09-02 RX ADMIN — FENOFIBRATE 145 MG: 145 TABLET, FILM COATED ORAL at 09:13

## 2023-09-02 RX ADMIN — SODIUM CHLORIDE 100 ML/HR: 0.9 INJECTION, SOLUTION INTRAVENOUS at 14:30

## 2023-09-02 NOTE — PROGRESS NOTES
[address]  Progress Note  Name: Kenyon Pompa  MRN: [id number]  Unit/Bed#: [facility] I Date of Admission: 8/31/2023   Date of Service: 9/2/2023 I Hospital Day: 1    Assessment/Plan   * Syncope  Assessment & Plan   report having history of syncope approximately 2 years ago and still has loop recorder in place. Patient reports that he was told he had some pauses but not concerning as per his primary cardiology which has now moved from [facility] to [facility] and he has not had follow-up. Patient reports that he was sitting on a swing with the wife and suddenly started feeling lightheadedness, and felt he was going to pass out and subsequently lost consciousness without fall or head strike. Unclear etiology. Differential diagnosis includes cardiogenic vs neurogenic vs orthostatic hypotension vs hypoglycemia, vs infectious. 2D echo shows: Interpretation Summary       •  Technically difficult study. •  Left Ventricle: Left ventricular cavity size is normal. Wall thickness is moderately increased. There is moderate concentric hypertrophy. Systolic function is normal (65%). Wall motion is normal. Diastolic function is normal.  •  Right Ventricle: Right ventricular cavity size is normal. Systolic function is normal.  •  Mitral Valve: There is trace regurgitation. •  Tricuspid Valve: There is trace regurgitation. •  Pulmonic Valve: There is mild regurgitation.       Continue telemetry monitoring: reviewed personally, no arrhythmia noted. 83 beats per minute NSR    Follow-up with cardiology consult: Per cardiology:  "Loop recorder interrogation showed no under lying rhythm abnormality around/during his syncopal event. However a 3.3-second (asymptomatic) pause was picked up for 8/20/2023. Patient to keep himself well-hydrated ". No further testing recommended.          SIRS (systemic inflammatory response syndrome) (HCC)  Assessment & Plan  Noted with tachycardia, fever, lactic acidosis. Lactic acidosis has now resolved. Patient reports his mother-in-law was ill with GI bug and patient was also not feeling well 3 days prior to presentation with episodes of fever and chills. He has had no symptoms but tick panel is pending. Will initiate Doxycycline while awaiting testing. Follow-up with Lyme titers   HIV was negative , hep C negative, 2 sets of blood cultures no growth at present. Clinically patient appears nontoxic and hemodynamically stable. Start Doxycycline, reassess in the am. May need to broaden if lyme testing negative. Elevated troponin  Assessment & Plan  Troponin noted to 62, 86, 90. Cardiology does not feel further testing warranted. Currently without chest pain or shortness of breath    Continue aspirin 81 mg daily, continue statin. Echo shows Preserved EF. No further testing warranted. Tobacco use  Assessment & Plan  Smoking cessation counseling provided    Type 2 diabetes mellitus [facility])  Assessment & Plan  Lab Results   Component Value Date    HGBA1C 9.3 (H) 08/01/2023       Recent Labs     09/01/23  1718 09/01/23  2037 09/02/23  0710 09/02/23  1204   POCGLU 147* 141* 132 172*       Blood Sugar Average: Last 72 hrs:     A1c noted to be 9.3  We will continue with insulin regiment  Hold home metformin, resume on discharge. Hyperlipidemia  Assessment & Plan  Continue statin    Hypertension  Assessment & Plan  Blood pressure currently controlled  Continue home BP medications including zestril, holding hctz             VTE Pharmacologic Prophylaxis:   Pharmacologic: Heparin  Mechanical VTE Prophylaxis in Place: Yes    Patient Centered Rounds: I have performed bedside rounds with nursing staff today. Discussions with Specialists or Other Care Team Provider: cardiology recommendations reviewed    Education and Discussions with Family / Patient: None requested    Time Spent for Care: 30 minutes.   More than 50% of total time spent on counseling and coordination of care as described above. Current Length of Stay: 1 day(s)    Current Patient Status: Inpatient   Certification Statement: The patient will continue to require additional inpatient hospital stay due to persistent fever    Discharge Plan: Home once medically stable. Code Status: Level 1 - Full Code      Subjective:   Pt reports no GI symptoms. Tmax 100.6. No further syncopal symptoms. Objective:     Vitals:   Temp (24hrs), Av.7 °F (37.6 °C), Min:97.9 °F (36.6 °C), Max:100.6 °F (38.1 °C)    Temp:  [97.9 °F (36.6 °C)-100.6 °F (38.1 °C)] 99.9 °F (37.7 °C)  HR:  [80-94] 94  Resp:  [16-19] 19  BP: (149-185)/(86-95) 185/95  SpO2:  [95 %-98 %] 97 %  Body mass index is 26.05 kg/m². Input and Output Summary (last 24 hours): Intake/Output Summary (Last 24 hours) at 2023 192  Last data filed at 2023 1715  Gross per 24 hour   Intake 1700 ml   Output 2620 ml   Net -920 ml       Physical Exam:     Physical Exam  Vitals and nursing note reviewed. Constitutional:       General: He is not in acute distress. Appearance: He is well-developed. HENT:      Head: Normocephalic and atraumatic. Eyes:      Conjunctiva/sclera: Conjunctivae normal.   Cardiovascular:      Rate and Rhythm: Normal rate and regular rhythm. Heart sounds: No murmur heard. Pulmonary:      Effort: Pulmonary effort is normal. No respiratory distress. Breath sounds: Normal breath sounds. Abdominal:      Palpations: Abdomen is soft. Tenderness: There is no abdominal tenderness. Musculoskeletal:         General: No swelling. Cervical back: Neck supple. Skin:     General: Skin is warm and dry. Capillary Refill: Capillary refill takes less than 2 seconds. Neurological:      Mental Status: He is alert.    Psychiatric:         Mood and Affect: Mood normal.           Additional Data:     Labs:    Results from last 7 days   Lab Units 23  0516 23  0457   WBC Thousand/uL 5.44 5. 62   HEMOGLOBIN g/dL 12.3 14.0   HEMATOCRIT % 37.1 42.3   PLATELETS Thousands/uL 192 197   NEUTROS PCT %  --  61   LYMPHS PCT %  --  27   MONOS PCT %  --  11   EOS PCT %  --  1     Results from last 7 days   Lab Units 09/02/23  0516 09/01/23  0457 08/31/23  1409   SODIUM mmol/L 133*   < > 133*   POTASSIUM mmol/L 3.4*   < > 4.0   CHLORIDE mmol/L 104   < > 99   CO2 mmol/L 22   < > 24   BUN mg/dL 8   < > 14   CREATININE mg/dL 0.69   < > 1.08   ANION GAP mmol/L 7   < > 10   CALCIUM mg/dL 7.8*   < > 9.3   ALBUMIN g/dL  --   --  4.1   TOTAL BILIRUBIN mg/dL  --   --  0.56   ALK PHOS U/L  --   --  82   ALT U/L  --   --  27   AST U/L  --   --  29   GLUCOSE RANDOM mg/dL 128   < > 148*    < > = values in this interval not displayed. Results from last 7 days   Lab Units 08/31/23  1409   INR  1.04     Results from last 7 days   Lab Units 09/02/23  1204 09/02/23  0710 09/01/23  2037 09/01/23  1718 09/01/23  1203 09/01/23  0718 08/31/23  2102   POC GLUCOSE mg/dl 172* 132 141* 147* 125 124 139         Results from last 7 days   Lab Units 09/01/23  1109 08/31/23  1632 08/31/23  1409   LACTIC ACID mmol/L 0.7 2.1* 3.0*           * I Have Reviewed All Lab Data Listed Above. * Additional Pertinent Lab Tests Reviewed: [facility] Admission Reviewed    Imaging:    Imaging Reports Reviewed Today Include: None  Imaging Personally Reviewed by Myself Includes:  None    Recent Cultures (last 7 days):     Results from last 7 days   Lab Units 09/01/23  1647   BLOOD CULTURE  Received in Microbiology Lab. Culture in Progress. Received in Microbiology Lab. Culture in Progress.        Last 24 Hours Medication List:   Current Facility-Administered Medications   Medication Dose Route Frequency Provider Last Rate   • acetaminophen  650 mg Oral Q6H PRN Cory Taylor MD     • albuterol  2 puff Inhalation Q4H PRN Cory Taylor MD     • aspirin  81 mg Oral Daily Ba MENJIVAR MD     • atorvastatin  40 mg Oral Daily With [facility] Radha Jarquin MD     • dextromethorphan-guaiFENesin  10 mL Oral Q6H PRN Ta Gracia PA-C     • doxycycline hyclate  100 mg Oral Q12H Chetan Krishnamurthy MD     • ezetimibe  10 mg Oral Daily Cory Taylor MD     • fenofibrate  145 mg Oral Daily Cory Taylor MD     • fluticasone-vilanterol  1 puff Inhalation Daily Cory Taylor MD     • gabapentin  300 mg Oral TID Cory Taylor MD     • heparin (porcine)  5,000 Units Subcutaneous Q8H [address] Cory Taylor MD     • insulin lispro  100 Units Subcutaneous Insulin Pump Daily PRN Ta Gracia PA-C     • lisinopril  20 mg Oral Daily Cory Taylor MD     • nicotine  1 patch Transdermal Daily Cory Taylor MD     • ondansetron  4 mg Intravenous Q6H PRN Cory Taylor MD     • pantoprazole  40 mg Oral Early Morning Cory Taylor MD     • patient maintained insulin pump  1 each Subcutaneous Q8H Aria Hernandez PA-C     • pneumococcal 20-leslie conj vacc  0.5 mL Intramuscular Once PRN Cory Taylor MD     • sodium chloride  100 mL/hr Intravenous Continuous Radha Jraquin MD Stopped (09/02/23 3581)        Today, Patient Was Seen By: Benedicto Sullivan MD    ** Please Note: Dictation voice to text software may have been used in the creation of this document.  **

## 2023-09-02 NOTE — UTILIZATION REVIEW
NOTIFICATION OF INPATIENT ADMISSION   AUTHORIZATION REQUEST   SERVICING FAC[facility] [place] [place]  Tax ID: [id number]  NPI: [id number] ATTENDING PROVIDER:  Attending Name and NPI#: Beatriz Hughes Md [[id number]]  Address: [place], [address] [place] [address]  Phone: [phone]     ADMISSION INFORMATION:  [facility] [facility]   Place of Service Code: 21  Inpatient Admission Date/Time: 9/1/23 12:23 PM  Discharge Date/Time: No discharge date for patient encounter. Admitting Diagnosis Code/Description:  Syncope [R55]  Elevated troponin [R77.8]     UTILIZATION REVIEW CONTACT:  Rober Jones Utilization   Network Utilization Review Department  Phone: [phone]  Fax [phone]  Email: [email or web address]  Contact for approvals/pending authorizations, clinical reviews, and discharge. PHYSICIAN ADVISORY SERVICES:  Medical Necessity Denial & [id number] Review  Phone: [phone]  Fax: [phone]  Email: [email or web address]

## 2023-09-02 NOTE — ASSESSMENT & PLAN NOTE
report having history of syncope approximately 2 years ago and still has loop recorder in place. Patient reports that he was told he had some pauses but not concerning as per his primary cardiology which has now moved from [facility] to [facility]) and he has not had follow-up. Patient reports that he was sitting on a swing with the wife and suddenly started feeling lightheadedness, and felt he was going to pass out and subsequently lost consciousness without fall or head strike. Unclear etiology. Differential diagnosis includes cardiogenic vs neurogenic vs orthostatic hypotension vs hypoglycemia, vs infectious. 2D echo shows: Interpretation Summary       •  Technically difficult study. •  Left Ventricle: Left ventricular cavity size is normal. Wall thickness is moderately increased. There is moderate concentric hypertrophy. Systolic function is normal (65%). Wall motion is normal. Diastolic function is normal.  •  Right Ventricle: Right ventricular cavity size is normal. Systolic function is normal.  •  Mitral Valve: There is trace regurgitation. •  Tricuspid Valve: There is trace regurgitation. •  Pulmonic Valve: There is mild regurgitation.       Continue telemetry monitoring: reviewed personally, no arrhythmia noted. Follow-up with cardiology consult: Per cardiology:  "Loop recorder interrogation showed no under lying rhythm abnormality around/during his syncopal event. However a 3.3-second (asymptomatic) pause was picked up for 8/20/2023. Patient to keep himself well-hydrated ". No further testing recommended.

## 2023-09-02 NOTE — CONSULTS
Consultation - Cardiology Team One  Gutierrez Francis 63 y.o. male MRN: [id number]  Unit/Bed#: -01 Encounter: [id number]    Inpatient consult to Cardiology  Consult performed by: LONNIE Torrez  Consult ordered by: Berna Cedeño MD          Physician Requesting Consult: Yris Malcolm MD  Reason for Consult / Principal Problem: Syncope    Assessment/Plan:    1. Syncope  - [place] while in a sitting position with no symptoms prior to loss of consciousness  - Check orthostatic vitals  - TTE performed yesterday and revealed normal systolic function with an EF of 65%, no regional wall motion abnormalities, moderately increased wall thickness with moderate concentric hypertrophy, trace mitral and tricuspid valve regurgitation and mild pulmonic valve regurgitation  - [place] representative interrogated loop recorder which revealed a 3.3-second pause around noon on August 20 with no reported symptoms and likely not the cause of his syncope  - Continue to monitor on telemetry  - Further management per primary team    2. Systemic inflammatory response syndrome  - Patient noted with tachycardia, fever and lactic acidosis  - Suspect viral etiology  - Management per primary team    3. Nonischemic myocardial injury  - Patient noted to have elevated troponin, denies chest pain, shortness of breath or other anginal equivalent  - Likely secondary to SIRS/viral infection  - Will continue to monitor     4. Hypertension  - Blood pressure overall controlled on lisinopril  - Continue to monitor    5. Hyperlipidemia  -Check lipid panel  - Continue Zetia, Tricor and atorvastatin    6. Poorly controlled diabetes type 2  - Most recent hemoglobin A1c 9.3  - Management per primary team    7. Tobacco abuse  - Continue with nicotine patch  - Cessation encouraged    At this time cardiology will be signing off, please feel free to contact us for any further issues or questions.     HPI: Cardiologist  Bandar Pickard is a 63y.o. year old male who has a history of hypertension, hyperlipidemia, diabetes type 2, asthma, tobacco abuse and prior syncopal event in January 2022 who presented to the emergency room on 8/31/2023 for another syncopal event. Patient states that he had not been feeling well for 3 days prior to admission and states that he was feeling better and was sitting on the porch swing with his wife and suddenly felt like he was going to pass out and lost consciousness. He denies any type of symptoms prior to passing out and once admitted was noted to be febrile with tachycardia and lactic acidosis denies any type of infectious symptoms. High-sensitivity troponin minimally elevated at 62/86/82 and 93. Fortunately, he does have a [employer] loop recorder, he also follows with cardiology at [address]      REVIEW OF SYSTEMS:  Constitutional:  Denies fever or chills   Eyes:  Denies change in visual acuity   HENT:  Denies nasal congestion or sore throat   Respiratory:  Denies cough, orthopnea, PND or shortness of breath   Cardiovascular:  Denies chest pain, palpitations or edema   GI:  Denies abdominal pain, nausea, vomiting, bloody stools or diarrhea   :  Denies dysuria, frequency, difficulty in micturition and nocturia  Musculoskeletal:  Denies back pain or joint pain   Neurologic:  Denies headache, focal weakness or sensory changes   Endocrine:  Denies polyuria or polydipsia   Lymphatic:  Denies swollen glands   Psychiatric:  Denies depression or anxiety     Historical Information   Past Medical History:   Diagnosis Date   • Asthma    • Cardiac murmur    • COPD (chronic obstructive pulmonary disease) (HCC)    • Diabetes mellitus ([address])    • Hyperlipidemia    • Hypertension      Past Surgical History:   Procedure Laterality Date   • APPENDECTOMY      teenager      Social History     Substance and Sexual Activity   Alcohol Use Yes    Comment: occassional     Social History     Substance and Sexual Activity   Drug Use No     Social History     Tobacco Use   Smoking Status Some Days   • Packs/day: 0.25   • Years: 25.00   • Total pack years: 6.25   • Types: Cigarettes   Smokeless Tobacco Never       Family History:   Family History   Problem Relation Age of Onset   • Hypertension Mother    • Hypertension Father        MEDS & ALLERGIES:  all current active meds have been reviewed and current meds:   Current Facility-Administered Medications   Medication Dose Route Frequency   • acetaminophen (TYLENOL) tablet 650 mg  650 mg Oral Q6H PRN   • albuterol (PROVENTIL HFA,VENTOLIN HFA) inhaler 2 puff  2 puff Inhalation Q4H PRN   • aspirin (ECOTRIN LOW STRENGTH) EC tablet 81 mg  81 mg Oral Daily   • atorvastatin (LIPITOR) tablet 40 mg  40 mg Oral Daily With Dinner   • dextromethorphan-guaiFENesin (ROBITUSSIN DM) oral syrup 10 mL  10 mL Oral Q6H PRN   • ezetimibe (ZETIA) tablet 10 mg  10 mg Oral Daily   • fenofibrate (TRICOR) tablet 145 mg  145 mg Oral Daily   • fluticasone-vilanterol 200-25 mcg/actuation 1 puff  1 puff Inhalation Daily   • gabapentin (NEURONTIN) capsule 300 mg  300 mg Oral TID   • heparin (porcine) subcutaneous injection 5,000 Units  5,000 Units Subcutaneous Q8H [address]   • insulin lispro (HumaLOG) FOR PUMP REFILLS 100 Units  100 Units Subcutaneous Insulin Pump Daily PRN   • lisinopril (ZESTRIL) tablet 20 mg  20 mg Oral Daily   • nicotine (NICODERM CQ) 14 mg/24hr TD 24 hr patch 1 patch  1 patch Transdermal Daily   • ondansetron (ZOFRAN) injection 4 mg  4 mg Intravenous Q6H PRN   • pantoprazole (PROTONIX) EC tablet 40 mg  40 mg Oral Early Morning   • PATIENT MAINTAINED INSULIN PUMP 1 each  1 each Subcutaneous Q8H   • pneumococcal 20-leslie conj vacc (PREVNAR 20) IM Injection 0.5 mL  0.5 mL Intramuscular Once PRN   • sodium chloride 0.9 % infusion  100 mL/hr Intravenous Continuous     sodium chloride, 100 mL/hr, Last Rate: 100 mL/hr (09/02/23 0515)      No Known Allergies    OBJECTIVE:  Vitals: Vitals:    09/02/23 0911   BP:    Pulse:    Resp:    Temp: (!) 100.6 °F (38.1 °C)   SpO2:      Body mass index is 26.05 kg/m². Systolic (90HXD), WWV:849 , Min:127 , FMO:972     Diastolic (26OCU), QJI:47, Min:74, Max:90      Intake/Output Summary (Last 24 hours) at 9/2/2023 1054  Last data filed at 9/2/2023 0601  Gross per 24 hour   Intake 900 ml   Output 1000 ml   Net -100 ml     Weight (last 2 days)     Date/Time Weight    09/02/23 0600 84.7 (186.8)    09/01/23 0900 82.5 (181.88)    09/01/23 0533 82.5 (181.88)    08/31/23 1316 82.1 (181)        Invasive Devices     Peripheral Intravenous Line  Duration           Peripheral IV 08/31/23 Proximal;Right;Ventral (anterior) Forearm 1 day                PHYSICAL EXAMS:  General:  Patient is not in acute distress, laying in the bed comfortably, awake, alert   Head: Normocephalic, Atraumatic.    HEENT: White sclera, pink conjunctiva  Neck:  Supple, no neck vein distention  Respiratory: clear to auscultation   Cardiovascular:  PMI normal, S1-S2 normal, no murmurs, thrills, gallops, rubs, regular rhythm  GI:  Abdomen soft, non-tender, non-distended  Extremities: No edema, normal pulses  Integument:  No skin rashes or ulceration  Lymphatic:  No cervical or inguinal lymphadenopathy  Neurologic:  Patient is awake alert, responding to command, oriented to person, place and time     LABORATORY RESULTS:      CBC with diff:   Results from last 7 days   Lab Units 09/02/23  0516 09/01/23  0457 08/31/23  1409   WBC Thousand/uL 5.44 5.62 8.78   HEMOGLOBIN g/dL 12.3 14.0 15.5   HEMATOCRIT % 37.1 42.3 47.9   MCV fL 86 86 89   PLATELETS Thousands/uL 192 197 221   RBC Million/uL 4.33 4.94 5.41   MCH pg 28.4 28.3 28.7   MCHC g/dL 33.2 33.1 32.4   RDW % 14.3 14.6 14.8   MPV fL 9.4 9.3 9.5   NRBC AUTO /100 WBCs  --  0 0       CMP:  Results from last 7 days   Lab Units 09/02/23  0516 09/01/23  0457 08/31/23  1409   POTASSIUM mmol/L 3.4* 3.5 4.0   CHLORIDE mmol/L 104 102 99   CO2 mmol/L 22 26 24   BUN mg/dL 8 13 14   CREATININE mg/dL 0.69 0.81 1.08   CALCIUM mg/dL 7.8* 8.8 9.3   AST U/L  --   --  29   ALT U/L  --   --  27   ALK PHOS U/L  --   --  82   EGFR ml/min/1.73sq m 105 98 75       BMP:  Results from last 7 days   Lab Units 23  0516 23  0457 23  1409   POTASSIUM mmol/L 3.4* 3.5 4.0   CHLORIDE mmol/L 104 102 99   CO2 mmol/L 22 26 24   BUN mg/dL 8 13 14   CREATININE mg/dL 0.69 0.81 1.08   CALCIUM mg/dL 7.8* 8.8 9.3                    Results from last 7 days   Lab Units 23  1409   TSH 3RD GENERATON uIU/mL 0.270*   FREE T4 ng/dL 0.76     Results from last 7 days   Lab Units 23  1409   INR  1.04       Lipid Profile:   Lab Results   Component Value Date    CHOL 207 2015     Lab Results   Component Value Date    HDL 43 2020    HDL 41 2015     Lab Results   Component Value Date    LDLCALC  2020      Comment:      Calculated LDL invalid, triglycerides >400 mg/dl  This screening LDL is a calculated result. It does not have the accuracy of the Direct Measured LDL in the monitoring of patients with hyperlipidemia and/or statin therapy. Direct Measure LDL (QKX524) must be ordered separately in these patients.     LDLCALC 112 (H) 2015     Lab Results   Component Value Date    TRIG 439 (H) 2020    TRIG 269 2015       Cardiac testing:   Results for orders placed during the hospital encounter of 17    Echo complete with contrast if indicated    Narrative  [address]  [address] [address]  [phone]    Transthoracic Echocardiogram  2D, M-mode, Doppler, and Color Doppler    Study date:  10-Dec-2017    Patient: Jean Downs  MR number: [id number]  Account number: [de-identified]  : 1965  Age: 47 years  Gender: Male  Status: Outpatient  Location: Bedside  Height: 71 in  Weight: 215 lb  BP: 122/ 78 mmHg    Indications: Chest Pain    Diagnoses: R07.9 - Chest pain, unspecified    Sonographer:  LOWELL Sanchez,RDCS  Interpreting Physician:  Fela Montalvo MD  Referring Physician:  Dilia Aparicio MD    SUMMARY    LEFT VENTRICLE:  Systolic function was normal. Ejection fraction was estimated in the range of 55 % to 65 %. There were no regional wall motion abnormalities. Wall thickness was increased. MITRAL VALVE:  There was trace regurgitation. TRICUSPID VALVE:  There was trace regurgitation. PULMONIC VALVE:  There was mild regurgitation. HISTORY: PRIOR HISTORY: Hypertension, Hyperlipidemia, Type 2 Diabetes Mellitus, Asthma, Tobacco Abuse, Chest Pain, Acute Kidney Injury    PROCEDURE: The procedure was performed at the bedside. This was a routine study. The transthoracic approach was used. The study included complete 2D imaging, M-mode, complete spectral Doppler, and color Doppler. The heart rate was 81 bpm,  at the start of the study. Images were obtained from the parasternal, apical, subcostal, and suprasternal notch acoustic windows. Image quality was adequate. LEFT VENTRICLE: Size was normal. Systolic function was normal. Ejection fraction was estimated in the range of 55 % to 65 %. There were no regional wall motion abnormalities. Wall thickness was increased. DOPPLER: Left ventricular  diastolic function parameters were normal.    RIGHT VENTRICLE: The size was normal. Systolic function was normal. Wall thickness was normal.    LEFT ATRIUM: Size was normal.    RIGHT ATRIUM: Size was normal.    MITRAL VALVE: Valve structure was normal. There was no echocardiographic evidence of vegetation. DOPPLER: There was trace regurgitation. AORTIC VALVE: The valve was trileaflet. Leaflets exhibited normal thickness and normal cuspal separation. There was no echocardiographic evidence of vegetation. DOPPLER: Transaortic velocity was within the normal range. There was no  evidence for stenosis. There was no regurgitation.     TRICUSPID VALVE: The valve structure was normal. There was no echocardiographic evidence of vegetation. DOPPLER: There was trace regurgitation. PULMONIC VALVE: Leaflets exhibited normal thickness, no calcification, and normal cuspal separation. There was no echocardiographic evidence of vegetation. DOPPLER: The transpulmonic velocity was within the normal range. There was mild  regurgitation. PERICARDIUM: There was no pericardial effusion. The pericardium was normal in appearance. AORTA: The root exhibited normal size. SYSTEM MEASUREMENT TABLES    2D  %FS: 37.2 %  Ao Diam: 3.6 cm  EDV(Teich): 97.2 ml  EF Biplane: 63.8 %  EF(Teich): 67.3 %  ESV(Teich): 31.8 ml  IVSd: 1.5 cm  LA Area: 14.9 cm2  LA Diam: 3.2 cm  LVEDV MOD A2C: 101 ml  LVEDV MOD A4C: 110.5 ml  LVEDV MOD BP: 106.5 ml  LVEF MOD A2C: 71.3 %  LVEF MOD A4C: 54.4 %  LVESV MOD A2C: 29 ml  LVESV MOD A4C: 50.4 ml  LVESV MOD BP: 38.6 ml  LVIDd: 4.6 cm  LVIDs: 2.9 cm  LVLd A2C: 9.1 cm  LVLd A4C: 9.3 cm  LVLs A2C: 7.5 cm  LVLs A4C: 7.7 cm  LVPWd: 1.4 cm  RA Area: 15.9 cm2  RVIDd: 2.8 cm  SV MOD A2C: 72 ml  SV MOD A4C: 60.1 ml  SV(Teich): 65.4 ml    CW  TR Vmax: 1.9 m/s  TR maxP.5 mmHg    MM  TAPSE: 2.5 cm    PW  AVC: 302.1 ms  E': 0.1 m/s  E/E': 10.4  MV A [place]: 0.9 m/s  MV Dec [place]: 6.2 m/s2  MV DecT: 160 ms  MV E [place]: 1 m/s  MV E/A Ratio: 1.1  MV PHT: 46.4 ms  MVA By PHT: 4.7 cm2    [facility] Accredited Echocardiography Laboratory    Prepared and electronically signed by    Jewel Mandujano MD  Signed 10-Dec-2017 14:24:10    No results found for this or any previous visit. No valid procedures specified.   Results for orders placed during the hospital encounter of 17    NM myocardial perfusion spect (stress and/or rest)    Narrative  [facility] [place], [place] [address]  [phone]    Rest/Stress Gated SPECT Myocardial Perfusion Imaging After Exercise    Patient: Rosie Vergara  MR number: [id number]  Account number: [id number]  : 1965  Age: 47 years  Gender: Male  Status: Outpatient  Location: Stress lab  Height: 71 in  Weight: 215 lb  BP: 158/ 90 mmHg    Allergies: NO KNOWN ALLERGIES    Diagnosis: R07.9 - Chest pain, unspecified    RN:  Reese Montez  Interpreting Physician:  Supa Arceo MD  Technician:  Travon Tafoya  Group:  [facility]  Report Prepared By[de-identified]  Reese Montez    INDICATIONS: Coronary artery disease. HISTORY: The patient is a 47year old  male. Chest pain status: recent onset chest pain. Coronary artery disease risk factors: dyslipidemia, hypertension, smoking, and diabetes mellitus. Cardiovascular history: none  significant. Co-morbidity: history of lung disease - Asthma and history of renal disease - [place]. REST ECG: Normal sinus rhythm. PROCEDURE: The study was performed in the stress lab. Treadmill exercise testing was performed, using the Jhonathan protocol. Gated SPECT myocardial perfusion imaging was performed after stress and at rest. Systolic blood pressure was 158  mmHg, at the start of the study. Diastolic blood pressure was 90 mmHg, at the start of the study. The heart rate was 78 bpm, at the start of the study. IV double checked. JHONATHAN PROTOCOL:  HR bpm SBP mmHg DBP mmHg Symptoms  Baseline 93 158 90 none  Stage 1 122 198 100 mild dyspnea  Stage 2 142 220 100 moderate dyspnea, moderate fatigue  Stage 3 153 -- -- moderate dyspnea, moderate fatigue  Recovery 1 103 230 100 subsiding  Recovery 3 99 190 90 none  Recovery 5 94 152 86 none  No medications or fluids given. STRESS SUMMARY: Duration of exercise was 7 min and 0 sec. The patient exercised to protocol stage 3. Maximal work rate was 10.1 METs. Maximal heart rate during stress was 155 bpm ( 92 % of maximal predicted heart rate). The rate-pressure  product for the peak heart rate and blood pressure was 49564. There was no chest pain during stress.  The stress test was terminated due to moderate dyspnea and moderate fatigue. Pre oxygen saturation: 96 %. Peak oxygen saturation: 97 %. The stress ECG was negative for ischemia and normal. There were no stress arrhythmias or conduction abnormalities. ISOTOPE ADMINISTRATION:  Resting isotope administration Stress isotope administration  Agent Tetrofosmin Tetrofosmin  Dose 10 mCi 30.9 mCi  Date 12/11/2017 12/11/2017  Injection-image interval 30 min 45 min    The radiopharmaceutical was injected one minute before the end of exercise. PERFUSION DEFECTS:  -  There was a moderate-sized, moderately severe, fixed myocardial perfusion defect of the basal to mid inferior wall likely due to diaphragm attenuation. GATED SPECT:  The calculated left ventricular ejection fraction was 57 %. Left ventricular ejection fraction was within normal limits by visual estimate. There was no left ventricular regional abnormality. SUMMARY:  -  Stress results: Duration of exercise was 7 min and 0 sec. Target heart rate was achieved. There was no chest pain during stress. -  ECG conclusions: The stress ECG was negative for ischemia and normal.  -  Perfusion imaging: There was a moderate-sized, moderately severe, fixed myocardial perfusion defect of the basal to mid inferior wall likely due to diaphragm attenuation.  -  Gated SPECT: The calculated left ventricular ejection fraction was 57 %. Left ventricular ejection fraction was within normal limits by visual estimate. There was no left ventricular regional abnormality. IMPRESSIONS: Normal study. There was image artifact, without diagnostic evidence for perfusion abnormality. Left ventricular systolic function was normal.    Prepared and signed by    Chan Del Valle MD  Signed 12/11/2017 12:22:33        Imaging:   I have personally reviewed pertinent reports.         EKG reviewed personally:  Normal sinus rhythm    Telemetry reviewed personally:   Normal sinus rhythm in the 80s      Code Status: Level 1 - Full Esteban  9/2/2023,10:54 AM

## 2023-09-02 NOTE — ASSESSMENT & PLAN NOTE
Noted with tachycardia, fever, lactic acidosis. Lactic acidosis has now resolved. Patient reports his mother-in-law was ill with GI bug and patient was also not feeling well 3 days prior to presentation with episodes of fever and chills. He has had no symptoms but tick panel is pending. Will initiate Doxycycline while awaiting testing. Follow-up with Lyme titers   HIV was negative , hep C negative, 2 sets of blood cultures no growth at present. Clinically patient appears nontoxic and hemodynamically stable. Start Doxycycline, reassess in the am. May need to broaden if lyme testing negative.

## 2023-09-02 NOTE — PLAN OF CARE
Problem: PAIN - ADULT  Goal: Verbalizes/displays adequate comfort level or baseline comfort level  Description: Interventions:  - Encourage patient to monitor pain and request assistance  - Assess pain using appropriate pain scale  - Administer analgesics based on type and severity of pain and evaluate response  - Implement non-pharmacological measures as appropriate and evaluate response  - Consider cultural and social influences on pain and pain management  - Notify physician/advanced practitioner if interventions unsuccessful or patient reports new pain  Outcome: Progressing     Problem: INFECTION - ADULT  Goal: Absence or prevention of progression during hospitalization  Description: INTERVENTIONS:  - Assess and monitor for signs and symptoms of infection  - Monitor lab/diagnostic results  - Monitor all insertion sites, i.e. indwelling lines, tubes, and drains  - Monitor endotracheal if appropriate and nasal secretions for changes in amount and color  - [place] appropriate cooling/warming therapies per order  - Administer medications as ordered  - Instruct and encourage patient and family to use good hand hygiene technique  - Identify and instruct in appropriate isolation precautions for identified infection/condition  Outcome: Progressing  Goal: Absence of fever/infection during neutropenic period  Description: INTERVENTIONS:  - Monitor WBC    Outcome: Progressing

## 2023-09-02 NOTE — ASSESSMENT & PLAN NOTE
Troponin noted to 62, 86, 90. Cardiology does not feel further testing warranted. Currently without chest pain or shortness of breath    Continue aspirin 81 mg daily, continue statin. Echo shows Preserved EF. No further testing warranted.

## 2023-09-02 NOTE — ASSESSMENT & PLAN NOTE
Lab Results   Component Value Date    HGBA1C 9.3 (H) 08/01/2023       Recent Labs     09/01/23  1718 09/01/23 2037 09/02/23  0710 09/02/23  1204   POCGLU 147* 141* 132 172*       Blood Sugar Average: Last 72 hrs:     A1c noted to be 9.3  We will continue with insulin regiment  Hold home metformin, resume on discharge. done

## 2023-09-03 LAB
ANION GAP SERPL CALCULATED.3IONS-SCNC: 7 MMOL/L
BUN SERPL-MCNC: 6 MG/DL (ref 5–25)
CALCIUM SERPL-MCNC: 8.7 MG/DL (ref 8.4–10.2)
CHLORIDE SERPL-SCNC: 101 MMOL/L (ref 96–108)
CO2 SERPL-SCNC: 25 MMOL/L (ref 21–32)
CREAT SERPL-MCNC: 0.63 MG/DL (ref 0.6–1.3)
GFR SERPL CREATININE-BSD FRML MDRD: 109 ML/MIN/1.73SQ M
GLUCOSE SERPL-MCNC: 134 MG/DL (ref 65–140)
GLUCOSE SERPL-MCNC: 143 MG/DL (ref 65–140)
GLUCOSE SERPL-MCNC: 156 MG/DL (ref 65–140)
POTASSIUM SERPL-SCNC: 3.4 MMOL/L (ref 3.5–5.3)
SODIUM SERPL-SCNC: 133 MMOL/L (ref 135–147)

## 2023-09-03 PROCEDURE — 99238 HOSP IP/OBS DSCHRG MGMT 30/<: CPT | Performed by: PHYSICIAN ASSISTANT

## 2023-09-03 PROCEDURE — 83036 HEMOGLOBIN GLYCOSYLATED A1C: CPT | Performed by: INTERNAL MEDICINE

## 2023-09-03 PROCEDURE — 82948 REAGENT STRIP/BLOOD GLUCOSE: CPT

## 2023-09-03 PROCEDURE — 80048 BASIC METABOLIC PNL TOTAL CA: CPT | Performed by: INTERNAL MEDICINE

## 2023-09-03 RX ADMIN — ASPIRIN 81 MG: 81 TABLET, COATED ORAL at 08:54

## 2023-09-03 RX ADMIN — FENOFIBRATE 145 MG: 145 TABLET, FILM COATED ORAL at 08:54

## 2023-09-03 RX ADMIN — DOXYCYCLINE HYCLATE 100 MG: 100 CAPSULE ORAL at 08:54

## 2023-09-03 RX ADMIN — LISINOPRIL 20 MG: 20 TABLET ORAL at 08:55

## 2023-09-03 RX ADMIN — NICOTINE 1 PATCH: 14 PATCH, EXTENDED RELEASE TRANSDERMAL at 08:55

## 2023-09-03 RX ADMIN — ACETAMINOPHEN 650 MG: 325 TABLET, FILM COATED ORAL at 02:38

## 2023-09-03 RX ADMIN — EZETIMIBE 10 MG: 10 TABLET ORAL at 08:54

## 2023-09-03 RX ADMIN — HEPARIN SODIUM 5000 UNITS: 5000 INJECTION INTRAVENOUS; SUBCUTANEOUS at 06:06

## 2023-09-03 RX ADMIN — PANTOPRAZOLE SODIUM 40 MG: 40 TABLET, DELAYED RELEASE ORAL at 06:06

## 2023-09-03 NOTE — DISCHARGE SUMMARY
[address]  Discharge- Hortencia Foil 1965, 63 y.o. male MRN: [id number]  Unit/Bed#: MS [facility] Encounter: [id number]  Primary Care Provider: Robin Vallecillo   Date and time admitted to hospital: 8/31/2023  1:14 PM    SIRS (systemic inflammatory response syndrome) ([address])  Assessment & Plan  Noted with tachycardia, fever, lactic acidosis. Lactic acidosis has now resolved. Patient reports his mother-in-law was ill with GI bug and patient was also not feeling well 3 days prior to presentation with episodes of fever and chills. The patient was started on doxycycline on 9/2 as he continued to have fevers and tick panel was pending  Lyme- negative  HIV was negative , hep C negative, 2 sets of blood cultures no growth at present. Clinically patient appears nontoxic and hemodynamically stable. Patient continued to have a fever with last fever of 100.6 at 0228 on 9/3/2023   The patient reported having "gum swelling and tenderness" he stated he thought the fevers were due to this but now resolved since starting doxycyline. The patient stated he was feeling well and wanted to go home. I explained to the patient that he was not medically cleared for discharge given he was still having fevers on doxycyline and that I did not have a source for his infection. I explained that he should be without a fever for 24 hours prior to discharge. The patient stated " I'am only lying around and I can take care of fever with tylenol at home."  The patient signed out against medical advice. All potential complications to include death if he left without completing treatment were explained to the patient. Patient verbalized understanding and signed the [facility] paperwork. * Syncope  Assessment & Plan  Patient reports history of syncope approximately 2 years ago and still has loop recorder in place.   Patient reports that he was told he had some pauses but not concerning as per his primary cardiology which has now moved from [facility] to [facility]) and he has not had follow-up. Patient reports that he was sitting on a swing with the wife and suddenly started feeling lightheadedness, and felt he was going to pass out and subsequently lost consciousness without fall or head strike. Unclear etiology. Differential diagnosis includes cardiogenic vs neurogenic vs orthostatic hypotension vs hypoglycemia, vs infectious. 2D echo shows: Interpretation Summary       •  Technically difficult study. •  Left Ventricle: Left ventricular cavity size is normal. Wall thickness is moderately increased. There is moderate concentric hypertrophy. Systolic function is normal (65%). Wall motion is normal. Diastolic function is normal.  •  Right Ventricle: Right ventricular cavity size is normal. Systolic function is normal.  •  Mitral Valve: There is trace regurgitation. •  Tricuspid Valve: There is trace regurgitation. •  Pulmonic Valve: There is mild regurgitation.       Continue telemetry monitoring: reviewed personally, no arrhythmia noted. Follow-up with cardiology consult: Per cardiology:  "Loop recorder interrogation showed no under lying rhythm abnormality around/during his syncopal event. However a 3.3-second (asymptomatic) pause was picked up for 8/20/2023. Patient to keep himself well-hydrated ". No further testing recommended. Elevated troponin  Assessment & Plan  Troponin noted to 62, 86, 90. Cardiology does not feel further testing warranted. Currently without chest pain or shortness of breath    Continue aspirin 81 mg daily, continue statin. Echo shows Preserved EF. No further testing warranted.        Tobacco use  Assessment & Plan  Smoking cessation counseling provided    Type 2 diabetes mellitus [facility])  Assessment & Plan  Lab Results   Component Value Date    HGBA1C 9.3 (H) 08/01/2023       Recent Labs     09/02/23  1204 09/02/23  2054 09/03/23  0736 09/03/23  1107   POCGLU 172* 183* 143* 156*       Blood Sugar Average: Last 72 hrs:     A1c noted to be 9.3  Resume home regimen     Hyperlipidemia  Assessment & Plan  Continue statin    Hypertension  Assessment & Plan  Blood pressure currently controlled  Continue home BP medications including zestril, holding hctz      Medical Problems     Resolved Problems  Date Reviewed: 9/3/2023   None       Discharging Physician / Practitioner: Natasha Lara PA-C  PCP: Margie Cary  Admission Date:   Admission Orders (From admission, onward)     Ordered        09/01/23 1223  Inpatient Admission  Once            08/31/23 1819  Place in Observation  Once                      Discharge Date: 09/03/23    Consultations During Hospital Stay:  · Cardiology    Procedures Performed:   · none    Significant Findings / Test Results:   XR chest 2 views    Result Date: 8/31/2023  · Impression: No focal consolidation, pleural effusion, or pneumothorax. Resident: Allen Alonzo, the attending radiologist, have reviewed the images and agree with the final report above. Workstation performed: [id number]   ·     Incidental Findings:   · none       Test Results Pending at Discharge (will require follow up):   · none     Outpatient Tests Requested:  · none    Complications:  none    Reason for Admission: syncope, SIRS    Hospital Course:   Karen Wang is a 63 y.o. male patient who originally presented to the hospital on 8/31/2023 due to a syncope episode at home. Please see H&P by Dr. Maxi Ramirez dated 8/31/2023 for complete details of presentation. The patient was started on IV fluids and placed on telemetry. He was noted to have elevated troponin's. Cardiology consulted and echocardiogram obtained. Cardiology felt the patient's syncopal episode was likely due to dehydration and no additional workup was warranted. They felt the elevated troponin's were likely due to nonischemic myocardial injury form ongoing infection. The patient met SIRS criteria however no source of infection identified.  IT was initially felt to be viral and abx were held. The patient continued to have fevers and he was started on doxycycline. HIV, lyme, Hep C and bcx were negative. The patient continued to have low grade fevers. On 9/3 the patient reported that he was having gum tenderness and swelling and on 9/2 and thought he had an infection due to his teeth. He stated since starting the doxycyline on 9/2 this had resolved. I asked the patient why he didn't inform the previous provider and he stated that he forgot to tell her. I explained to the patient that since I didn't have a source of infection and he continued to have fevers he was not medically stable for discharge. The patient stated he was "only lying around" here and that he can "take care of my fever with tylenol at home". The patient signed out against medically advice despite explaining the complications if he left. The patient stated he would follow up with his family doctor and dentist.         Please see above list of diagnoses and related plan for additional information. Condition at Discharge: stable    Discharge Day Visit / Exam:   * Please refer to separate progress note for these details *    Discussion with Family: Patient declined call to . Discharge instructions/Information to patient and family:   See after visit summary for information provided to patient and family. Provisions for Follow-Up Care:  See after visit summary for information related to follow-up care and any pertinent home health orders. Disposition:   Other: signed out AMA    Planned Readmission: no     Discharge Statement:  I spent 25 minutes discharging the patient. This time was spent on the day of discharge. I had direct contact with the patient on the day of discharge.  Greater than 50% of the total time was spent examining patient, answering all patient questions, arranging and discussing plan of care with patient as well as directly providing post-discharge instructions. Additional time then spent on discharge activities. Discharge Medications:  See after visit summary for reconciled discharge medications provided to patient and/or family.       **Please Note: This note may have been constructed using a voice recognition system**

## 2023-09-03 NOTE — ASSESSMENT & PLAN NOTE
Patient reports history of syncope approximately 2 years ago and still has loop recorder in place. Patient reports that he was told he had some pauses but not concerning as per his primary cardiology which has now moved from [facility] to [facility]) and he has not had follow-up. Patient reports that he was sitting on a swing with the wife and suddenly started feeling lightheadedness, and felt he was going to pass out and subsequently lost consciousness without fall or head strike. Unclear etiology. Differential diagnosis includes cardiogenic vs neurogenic vs orthostatic hypotension vs hypoglycemia, vs infectious. 2D echo shows: Interpretation Summary       •  Technically difficult study. •  Left Ventricle: Left ventricular cavity size is normal. Wall thickness is moderately increased. There is moderate concentric hypertrophy. Systolic function is normal (65%). Wall motion is normal. Diastolic function is normal.  •  Right Ventricle: Right ventricular cavity size is normal. Systolic function is normal.  •  Mitral Valve: There is trace regurgitation. •  Tricuspid Valve: There is trace regurgitation. •  Pulmonic Valve: There is mild regurgitation.       Continue telemetry monitoring: reviewed personally, no arrhythmia noted. Follow-up with cardiology consult: Per cardiology:  "Loop recorder interrogation showed no under lying rhythm abnormality around/during his syncopal event. However a 3.3-second (asymptomatic) pause was picked up for 8/20/2023. Patient to keep himself well-hydrated ". No further testing recommended.

## 2023-09-03 NOTE — ASSESSMENT & PLAN NOTE
Noted with tachycardia, fever, lactic acidosis. Lactic acidosis has now resolved. Patient reports his mother-in-law was ill with GI bug and patient was also not feeling well 3 days prior to presentation with episodes of fever and chills. The patient was started on doxycycline on 9/2 as he continued to have fevers and tick panel was pending  Lyme- negative  HIV was negative , hep C negative, 2 sets of blood cultures no growth at present. Clinically patient appears nontoxic and hemodynamically stable. Patient continued to have a fever with last fever of 100.6 at 0228 on 9/3/2023   The patient reported having "gum swelling and tenderness" he stated he thought the fevers were due to this but now resolved since starting doxycyline. The patient stated he was feeling well and wanted to go home. I explained to the patient that he was not medically cleared for discharge given he was still having fevers on doxycyline and that I did not have a source for his infection. I explained that he should be without a fever for 24 hours prior to discharge. The patient stated " I'am only lying around and I can take care of fever with tylenol at home."  The patient signed out against medical advice. All potential complications to include death if he left without completing treatment were explained to the patient. Patient verbalized understanding and signed the [facility] paperwork.

## 2023-09-03 NOTE — PLAN OF CARE
Problem: PAIN - ADULT  Goal: Verbalizes/displays adequate comfort level or baseline comfort level  Description: Interventions:  - Encourage patient to monitor pain and request assistance  - Assess pain using appropriate pain scale  - Administer analgesics based on type and severity of pain and evaluate response  - Implement non-pharmacological measures as appropriate and evaluate response  - Consider cultural and social influences on pain and pain management  - Notify physician/advanced practitioner if interventions unsuccessful or patient reports new pain  Outcome: Progressing     Problem: INFECTION - ADULT  Goal: Absence or prevention of progression during hospitalization  Description: INTERVENTIONS:  - Assess and monitor for signs and symptoms of infection  - Monitor lab/diagnostic results  - Monitor all insertion sites, i.e. indwelling lines, tubes, and drains  - Monitor endotracheal if appropriate and nasal secretions for changes in amount and color  - [place] appropriate cooling/warming therapies per order  - Administer medications as ordered  - Instruct and encourage patient and family to use good hand hygiene technique  - Identify and instruct in appropriate isolation precautions for identified infection/condition  Outcome: Progressing  Goal: Absence of fever/infection during neutropenic period  Description: INTERVENTIONS:  - Monitor WBC    Outcome: Progressing

## 2023-09-03 NOTE — ASSESSMENT & PLAN NOTE
Lab Results   Component Value Date    HGBA1C 9.3 (H) 08/01/2023       Recent Labs     09/02/23  1204 09/02/23 2054 09/03/23  0736 09/03/23  1107   POCGLU 172* 183* 143* 156*       Blood Sugar Average: Last 72 hrs:     A1c noted to be 9.3  Resume home regimen

## 2023-09-04 VITALS
OXYGEN SATURATION: 94 % | WEIGHT: 183.2 LBS | BODY MASS INDEX: 25.65 KG/M2 | RESPIRATION RATE: 18 BRPM | HEIGHT: 71 IN | TEMPERATURE: 97.5 F | DIASTOLIC BLOOD PRESSURE: 96 MMHG | SYSTOLIC BLOOD PRESSURE: 167 MMHG | HEART RATE: 89 BPM

## 2023-09-06 LAB
% PARASITEMIA: 0
BACTERIA BLD CULT: NORMAL
BACTERIA BLD CULT: NORMAL
EST. AVERAGE GLUCOSE BLD GHB EST-MCNC: 223 MG/DL
HBA1C MFR BLD: 9.4 %
PARASITE BLD: NO

## 2024-02-23 ENCOUNTER — HOSPITAL ENCOUNTER (INPATIENT)
Facility: HOSPITAL | Age: 59
LOS: 1 days | Discharge: LEFT BEFORE DISCHARGE | DRG: 313 | End: 2024-02-25
Attending: EMERGENCY MEDICINE | Admitting: STUDENT IN AN ORGANIZED HEALTH CARE EDUCATION/TRAINING PROGRAM
Payer: COMMERCIAL

## 2024-02-23 ENCOUNTER — APPOINTMENT (EMERGENCY)
Dept: RADIOLOGY | Facility: HOSPITAL | Age: 59
DRG: 313 | End: 2024-02-23
Payer: COMMERCIAL

## 2024-02-23 DIAGNOSIS — I45.5 SINUS PAUSE: Primary | ICD-10-CM

## 2024-02-23 DIAGNOSIS — R55 SYNCOPE: ICD-10-CM

## 2024-02-23 LAB
2HR DELTA HS TROPONIN: -2 NG/L
ALBUMIN SERPL BCP-MCNC: 4 G/DL (ref 3.5–5)
ALP SERPL-CCNC: 81 U/L (ref 34–104)
ALT SERPL W P-5'-P-CCNC: 33 U/L (ref 7–52)
ANION GAP SERPL CALCULATED.3IONS-SCNC: 8 MMOL/L
AST SERPL W P-5'-P-CCNC: 19 U/L (ref 13–39)
BASOPHILS # BLD AUTO: 0.03 THOUSANDS/ÂΜL (ref 0–0.1)
BASOPHILS NFR BLD AUTO: 0 % (ref 0–1)
BILIRUB SERPL-MCNC: 0.48 MG/DL (ref 0.2–1)
BUN SERPL-MCNC: 10 MG/DL (ref 5–25)
CALCIUM SERPL-MCNC: 9.3 MG/DL (ref 8.4–10.2)
CARDIAC TROPONIN I PNL SERPL HS: 15 NG/L
CARDIAC TROPONIN I PNL SERPL HS: 17 NG/L
CHLORIDE SERPL-SCNC: 104 MMOL/L (ref 96–108)
CO2 SERPL-SCNC: 23 MMOL/L (ref 21–32)
CREAT SERPL-MCNC: 0.67 MG/DL (ref 0.6–1.3)
EOSINOPHIL # BLD AUTO: 0.23 THOUSAND/ÂΜL (ref 0–0.61)
EOSINOPHIL NFR BLD AUTO: 3 % (ref 0–6)
ERYTHROCYTE [DISTWIDTH] IN BLOOD BY AUTOMATED COUNT: 14.4 % (ref 11.6–15.1)
GFR SERPL CREATININE-BSD FRML MDRD: 106 ML/MIN/1.73SQ M
GLUCOSE SERPL-MCNC: 200 MG/DL (ref 65–140)
HCT VFR BLD AUTO: 41.8 % (ref 36.5–49.3)
HGB BLD-MCNC: 14.1 G/DL (ref 12–17)
IMM GRANULOCYTES # BLD AUTO: 0.03 THOUSAND/UL (ref 0–0.2)
IMM GRANULOCYTES NFR BLD AUTO: 0 % (ref 0–2)
LYMPHOCYTES # BLD AUTO: 3.65 THOUSANDS/ÂΜL (ref 0.6–4.47)
LYMPHOCYTES NFR BLD AUTO: 45 % (ref 14–44)
MAGNESIUM SERPL-MCNC: 1.8 MG/DL (ref 1.9–2.7)
MCH RBC QN AUTO: 28.8 PG (ref 26.8–34.3)
MCHC RBC AUTO-ENTMCNC: 33.7 G/DL (ref 31.4–37.4)
MCV RBC AUTO: 86 FL (ref 82–98)
MONOCYTES # BLD AUTO: 0.73 THOUSAND/ÂΜL (ref 0.17–1.22)
MONOCYTES NFR BLD AUTO: 9 % (ref 4–12)
NEUTROPHILS # BLD AUTO: 3.58 THOUSANDS/ÂΜL (ref 1.85–7.62)
NEUTS SEG NFR BLD AUTO: 43 % (ref 43–75)
NRBC BLD AUTO-RTO: 0 /100 WBCS
PLATELET # BLD AUTO: 255 THOUSANDS/UL (ref 149–390)
PMV BLD AUTO: 9.3 FL (ref 8.9–12.7)
POTASSIUM SERPL-SCNC: 3.7 MMOL/L (ref 3.5–5.3)
PROT SERPL-MCNC: 7.4 G/DL (ref 6.4–8.4)
RBC # BLD AUTO: 4.89 MILLION/UL (ref 3.88–5.62)
SODIUM SERPL-SCNC: 135 MMOL/L (ref 135–147)
WBC # BLD AUTO: 8.25 THOUSAND/UL (ref 4.31–10.16)

## 2024-02-23 PROCEDURE — 80053 COMPREHEN METABOLIC PANEL: CPT | Performed by: EMERGENCY MEDICINE

## 2024-02-23 PROCEDURE — 85025 COMPLETE CBC W/AUTO DIFF WBC: CPT | Performed by: EMERGENCY MEDICINE

## 2024-02-23 PROCEDURE — 99285 EMERGENCY DEPT VISIT HI MDM: CPT | Performed by: EMERGENCY MEDICINE

## 2024-02-23 PROCEDURE — 84484 ASSAY OF TROPONIN QUANT: CPT | Performed by: EMERGENCY MEDICINE

## 2024-02-23 PROCEDURE — 99285 EMERGENCY DEPT VISIT HI MDM: CPT

## 2024-02-23 PROCEDURE — 96375 TX/PRO/DX INJ NEW DRUG ADDON: CPT

## 2024-02-23 PROCEDURE — 36415 COLL VENOUS BLD VENIPUNCTURE: CPT | Performed by: EMERGENCY MEDICINE

## 2024-02-23 PROCEDURE — 93005 ELECTROCARDIOGRAM TRACING: CPT

## 2024-02-23 PROCEDURE — 83735 ASSAY OF MAGNESIUM: CPT | Performed by: EMERGENCY MEDICINE

## 2024-02-23 PROCEDURE — 71045 X-RAY EXAM CHEST 1 VIEW: CPT

## 2024-02-23 RX ORDER — HYDRALAZINE HYDROCHLORIDE 20 MG/ML
10 INJECTION INTRAMUSCULAR; INTRAVENOUS ONCE
Status: COMPLETED | OUTPATIENT
Start: 2024-02-23 | End: 2024-02-23

## 2024-02-23 RX ADMIN — HYDRALAZINE HYDROCHLORIDE 10 MG: 20 INJECTION INTRAMUSCULAR; INTRAVENOUS at 19:32

## 2024-02-24 PROBLEM — R79.89 ELEVATED TROPONIN: Status: RESOLVED | Noted: 2023-08-31 | Resolved: 2024-02-24

## 2024-02-24 PROBLEM — R10.13 EPIGASTRIC PAIN: Status: RESOLVED | Noted: 2019-04-09 | Resolved: 2024-02-24

## 2024-02-24 PROBLEM — R65.10 SIRS (SYSTEMIC INFLAMMATORY RESPONSE SYNDROME) (HCC): Status: RESOLVED | Noted: 2023-09-01 | Resolved: 2024-02-24

## 2024-02-24 PROBLEM — N17.9 AKI (ACUTE KIDNEY INJURY) (HCC): Status: RESOLVED | Noted: 2017-12-08 | Resolved: 2024-02-24

## 2024-02-24 PROBLEM — I45.5 SINUS PAUSE: Status: ACTIVE | Noted: 2024-02-24

## 2024-02-24 PROBLEM — K59.00 CONSTIPATION: Status: RESOLVED | Noted: 2019-04-09 | Resolved: 2024-02-24

## 2024-02-24 PROBLEM — E83.42 HYPOMAGNESEMIA: Status: ACTIVE | Noted: 2024-02-24

## 2024-02-24 LAB
4HR DELTA HS TROPONIN: -1 NG/L
ALBUMIN SERPL BCP-MCNC: 3.7 G/DL (ref 3.5–5)
ALP SERPL-CCNC: 76 U/L (ref 34–104)
ALT SERPL W P-5'-P-CCNC: 27 U/L (ref 7–52)
ANION GAP SERPL CALCULATED.3IONS-SCNC: 10 MMOL/L
AST SERPL W P-5'-P-CCNC: 15 U/L (ref 13–39)
ATRIAL RATE: 102 BPM
ATRIAL RATE: 84 BPM
ATRIAL RATE: 86 BPM
BILIRUB SERPL-MCNC: 0.46 MG/DL (ref 0.2–1)
BUN SERPL-MCNC: 12 MG/DL (ref 5–25)
CALCIUM SERPL-MCNC: 9.2 MG/DL (ref 8.4–10.2)
CARDIAC TROPONIN I PNL SERPL HS: 16 NG/L
CHLORIDE SERPL-SCNC: 101 MMOL/L (ref 96–108)
CO2 SERPL-SCNC: 22 MMOL/L (ref 21–32)
CREAT SERPL-MCNC: 0.75 MG/DL (ref 0.6–1.3)
ERYTHROCYTE [DISTWIDTH] IN BLOOD BY AUTOMATED COUNT: 14.4 % (ref 11.6–15.1)
GFR SERPL CREATININE-BSD FRML MDRD: 101 ML/MIN/1.73SQ M
GLUCOSE SERPL-MCNC: 216 MG/DL (ref 65–140)
GLUCOSE SERPL-MCNC: 265 MG/DL (ref 65–140)
GLUCOSE SERPL-MCNC: 268 MG/DL (ref 65–140)
GLUCOSE SERPL-MCNC: 293 MG/DL (ref 65–140)
HCT VFR BLD AUTO: 41.3 % (ref 36.5–49.3)
HGB BLD-MCNC: 13.9 G/DL (ref 12–17)
MAGNESIUM SERPL-MCNC: 1.7 MG/DL (ref 1.9–2.7)
MCH RBC QN AUTO: 29 PG (ref 26.8–34.3)
MCHC RBC AUTO-ENTMCNC: 33.7 G/DL (ref 31.4–37.4)
MCV RBC AUTO: 86 FL (ref 82–98)
P AXIS: 53 DEGREES
P AXIS: 68 DEGREES
P AXIS: 71 DEGREES
PLATELET # BLD AUTO: 236 THOUSANDS/UL (ref 149–390)
PMV BLD AUTO: 9.7 FL (ref 8.9–12.7)
POTASSIUM SERPL-SCNC: 4.1 MMOL/L (ref 3.5–5.3)
PR INTERVAL: 164 MS
PR INTERVAL: 172 MS
PR INTERVAL: 188 MS
PROT SERPL-MCNC: 6.8 G/DL (ref 6.4–8.4)
QRS AXIS: 71 DEGREES
QRS AXIS: 72 DEGREES
QRS AXIS: 78 DEGREES
QRSD INTERVAL: 88 MS
QRSD INTERVAL: 94 MS
QRSD INTERVAL: 94 MS
QT INTERVAL: 348 MS
QT INTERVAL: 354 MS
QT INTERVAL: 356 MS
QTC INTERVAL: 418 MS
QTC INTERVAL: 426 MS
QTC INTERVAL: 453 MS
RBC # BLD AUTO: 4.79 MILLION/UL (ref 3.88–5.62)
SODIUM SERPL-SCNC: 133 MMOL/L (ref 135–147)
T WAVE AXIS: 20 DEGREES
T WAVE AXIS: 35 DEGREES
T WAVE AXIS: 51 DEGREES
VENTRICULAR RATE: 102 BPM
VENTRICULAR RATE: 84 BPM
VENTRICULAR RATE: 86 BPM
WBC # BLD AUTO: 8.39 THOUSAND/UL (ref 4.31–10.16)

## 2024-02-24 PROCEDURE — 96366 THER/PROPH/DIAG IV INF ADDON: CPT

## 2024-02-24 PROCEDURE — 80053 COMPREHEN METABOLIC PANEL: CPT | Performed by: PHYSICIAN ASSISTANT

## 2024-02-24 PROCEDURE — 82948 REAGENT STRIP/BLOOD GLUCOSE: CPT

## 2024-02-24 PROCEDURE — 36415 COLL VENOUS BLD VENIPUNCTURE: CPT | Performed by: PHYSICIAN ASSISTANT

## 2024-02-24 PROCEDURE — 99255 IP/OBS CONSLTJ NEW/EST HI 80: CPT | Performed by: FAMILY MEDICINE

## 2024-02-24 PROCEDURE — 96372 THER/PROPH/DIAG INJ SC/IM: CPT

## 2024-02-24 PROCEDURE — 85027 COMPLETE CBC AUTOMATED: CPT | Performed by: PHYSICIAN ASSISTANT

## 2024-02-24 PROCEDURE — 83735 ASSAY OF MAGNESIUM: CPT | Performed by: PHYSICIAN ASSISTANT

## 2024-02-24 PROCEDURE — 96365 THER/PROPH/DIAG IV INF INIT: CPT

## 2024-02-24 PROCEDURE — 93010 ELECTROCARDIOGRAM REPORT: CPT | Performed by: INTERNAL MEDICINE

## 2024-02-24 RX ORDER — ALBUTEROL SULFATE 90 UG/1
2 AEROSOL, METERED RESPIRATORY (INHALATION) EVERY 4 HOURS PRN
Status: DISCONTINUED | OUTPATIENT
Start: 2024-02-24 | End: 2024-02-25 | Stop reason: HOSPADM

## 2024-02-24 RX ORDER — INSULIN GLARGINE 100 [IU]/ML
10 INJECTION, SOLUTION SUBCUTANEOUS
Status: DISCONTINUED | OUTPATIENT
Start: 2024-02-25 | End: 2024-02-25 | Stop reason: HOSPADM

## 2024-02-24 RX ORDER — ATORVASTATIN CALCIUM 40 MG/1
40 TABLET, FILM COATED ORAL
Status: DISCONTINUED | OUTPATIENT
Start: 2024-02-24 | End: 2024-02-25 | Stop reason: HOSPADM

## 2024-02-24 RX ORDER — RIBOFLAVIN (VITAMIN B2) 100 MG
1 TABLET ORAL DAILY
COMMUNITY
Start: 2024-01-02

## 2024-02-24 RX ORDER — INSULIN GLARGINE 100 [IU]/ML
10 INJECTION, SOLUTION SUBCUTANEOUS ONCE
Status: COMPLETED | OUTPATIENT
Start: 2024-02-24 | End: 2024-02-24

## 2024-02-24 RX ORDER — LISINOPRIL 10 MG/1
20 TABLET ORAL DAILY
Status: DISCONTINUED | OUTPATIENT
Start: 2024-02-24 | End: 2024-02-25 | Stop reason: HOSPADM

## 2024-02-24 RX ORDER — HEPARIN SODIUM 5000 [USP'U]/ML
5000 INJECTION, SOLUTION INTRAVENOUS; SUBCUTANEOUS EVERY 8 HOURS SCHEDULED
Status: DISCONTINUED | OUTPATIENT
Start: 2024-02-24 | End: 2024-02-25 | Stop reason: HOSPADM

## 2024-02-24 RX ORDER — INSULIN LISPRO 100 [IU]/ML
1-6 INJECTION, SOLUTION INTRAVENOUS; SUBCUTANEOUS
Status: DISCONTINUED | OUTPATIENT
Start: 2024-02-25 | End: 2024-02-25 | Stop reason: HOSPADM

## 2024-02-24 RX ORDER — MAGNESIUM SULFATE HEPTAHYDRATE 40 MG/ML
2 INJECTION, SOLUTION INTRAVENOUS ONCE
Status: COMPLETED | OUTPATIENT
Start: 2024-02-24 | End: 2024-02-24

## 2024-02-24 RX ORDER — INSULIN LISPRO 100 [IU]/ML
3 INJECTION, SOLUTION INTRAVENOUS; SUBCUTANEOUS
Status: DISCONTINUED | OUTPATIENT
Start: 2024-02-24 | End: 2024-02-25

## 2024-02-24 RX ORDER — INSULIN LISPRO 100 [IU]/ML
1-6 INJECTION, SOLUTION INTRAVENOUS; SUBCUTANEOUS
Status: DISCONTINUED | OUTPATIENT
Start: 2024-02-24 | End: 2024-02-25 | Stop reason: HOSPADM

## 2024-02-24 RX ORDER — INSULIN GLARGINE 100 [IU]/ML
5 INJECTION, SOLUTION SUBCUTANEOUS
Status: DISCONTINUED | OUTPATIENT
Start: 2024-02-24 | End: 2024-02-24

## 2024-02-24 RX ORDER — INSULIN LISPRO 100 [IU]/ML
1-6 INJECTION, SOLUTION INTRAVENOUS; SUBCUTANEOUS
Status: DISCONTINUED | OUTPATIENT
Start: 2024-02-24 | End: 2024-02-24

## 2024-02-24 RX ORDER — NICOTINE 21 MG/24HR
1 PATCH, TRANSDERMAL 24 HOURS TRANSDERMAL DAILY
Status: DISCONTINUED | OUTPATIENT
Start: 2024-02-24 | End: 2024-02-25 | Stop reason: HOSPADM

## 2024-02-24 RX ORDER — FENOFIBRATE 145 MG/1
145 TABLET, COATED ORAL DAILY
Status: DISCONTINUED | OUTPATIENT
Start: 2024-02-24 | End: 2024-02-25 | Stop reason: HOSPADM

## 2024-02-24 RX ADMIN — HEPARIN SODIUM 5000 UNITS: 5000 INJECTION INTRAVENOUS; SUBCUTANEOUS at 23:27

## 2024-02-24 RX ADMIN — HEPARIN SODIUM 5000 UNITS: 5000 INJECTION INTRAVENOUS; SUBCUTANEOUS at 17:39

## 2024-02-24 RX ADMIN — NICOTINE 1 PATCH: 14 PATCH, EXTENDED RELEASE TRANSDERMAL at 17:34

## 2024-02-24 RX ADMIN — FENOFIBRATE 145 MG: 145 TABLET, FILM COATED ORAL at 17:54

## 2024-02-24 RX ADMIN — ATORVASTATIN CALCIUM 40 MG: 40 TABLET, FILM COATED ORAL at 17:34

## 2024-02-24 RX ADMIN — INSULIN LISPRO 3 UNITS: 100 INJECTION, SOLUTION INTRAVENOUS; SUBCUTANEOUS at 23:27

## 2024-02-24 RX ADMIN — LISINOPRIL 20 MG: 10 TABLET ORAL at 17:34

## 2024-02-24 RX ADMIN — MAGNESIUM SULFATE HEPTAHYDRATE 2 G: 40 INJECTION, SOLUTION INTRAVENOUS at 17:34

## 2024-02-24 RX ADMIN — INSULIN GLARGINE 10 UNITS: 100 INJECTION, SOLUTION SUBCUTANEOUS at 17:17

## 2024-02-24 RX ADMIN — INSULIN LISPRO 3 UNITS: 100 INJECTION, SOLUTION INTRAVENOUS; SUBCUTANEOUS at 19:02

## 2024-02-24 NOTE — ASSESSMENT & PLAN NOTE
/89 (BP Location: Left arm)   Pulse 96   Temp 97.6 °F (36.4 °C) (Oral)   Resp 22   SpO2 97%   Chronic, currently with stable pressures, 144/89  Noted to have episodes of hypertensive urgency - 177-207/  Continue with home medication regimen - 20 mg Lisinopril daily.

## 2024-02-24 NOTE — CONSULTS
[facility]  Consult  Name: Nahid Robertson 58 y.o. male I MRN: [id number]  Unit/Bed#: ED 19 I Date of Admission: 2/23/2024   Date of Service: 2/24/2024 I Hospital Day: 0    Consult to internal medicine  Consult performed by: Han Orta MD  Consult ordered by: Jose Eduardo Ybarra PA-C  Reason for consult: Medical management          Assessment/Plan   * Sinus pause  Assessment & Plan  Patient presented to the ED with complaints of palpitations, that he described as a feeling like his heart stopped, with chest discomfort.  Patient states that he has had syncopal episodes as well.  Patient was instructed to come to the ER because his loop recorder was noting sinus pauses for up to 5 seconds.  He is currently awaiting transfer to [facility] for EP evaluation.  Monitor on telemetry, may not be off telemetry for any reason.  Monitor electrolytes - keep K >4, Mag >2    Hypomagnesemia  Assessment & Plan  1.7 today  Ordered 2 g IV magnesium  Recheck in a.m.  Potassium stable  Continue to monitor on telemetry    Tobacco use  Assessment & Plan  Currently smokes, less than 1/2 ppd  Nicotine patch  Counseled on smoking cessation >3 min.  Patient verbalized understanding    Type 2 diabetes mellitus (HCC)  Assessment & Plan  As per patient he uses 2 units/h of basal insulin, his carb ratio is 1 unit of insulin per 7 g of carbohydrate  He ran out of his insulin in his pump.  Will switch to subcutaneous insulin while hospitalized  Start on 10 units Lantus, 3-3-3 Premeal Humalog, sliding scale insulin and carb controlled diet  Chronic, uncontrolled, as evidenced by a hemoglobin A1C of 9.2 from 9/23  Ozempic had been ordered and an Endocrinology consult placed.  Monitor blood glucose AC/HS  Hypoglycemia protocol      Hyperlipidemia  Assessment & Plan  History of  Continue with Lipitor and Fenofibrate  Appears he had been ordered Repatha as well, unsure if he has been taking this or not.  Continue outpatient  follow-up    Hypertension  Assessment & Plan  /89 (BP Location: Left arm)   Pulse 96   Temp 97.6 °F (36.4 °C) (Oral)   Resp 22   SpO2 97%   Chronic, currently with stable pressures, 144/89  Noted to have episodes of hypertensive urgency - 177-207/  Continue with home medication regimen - 20 mg Lisinopril daily.             VTE Prophylaxis: VTE Score: 3 Moderate Risk (Score 3-4) - Pharmacological DVT Prophylaxis Ordered: heparin.    Mobility:      [facility] Goal achieved. Continue to encourage appropriate mobility.    Recommendations for Discharge:  Awaiting transfer to \[facility]\""    Total Time Spent on Date of Encounter in care of patient: 55 mins. This time was spent on one or more of the following: performing physical exam; counseling and coordination of care; obtaining or reviewing history; documenting in the medical record; reviewing/ordering tests, medications or procedures; communicating with other healthcare professionals and discussing with patient's family/caregivers.    Collaboration of Care: Were Recommendations Directly Discussed with Primary Treatment Team? Yes    History of Present Illness:  Nahid Robertson is a 58 y.o. male who is originally presented to the emergency room as he was sent by his outpatient cardiologist who found sinus pauses 5 seconds long as patient was also found to be having syncopal episodes at home.  ER physicians have reached out to [facility] and patient is awaiting transfer for EP consultation and possible pacemaker implantation depending on further recommendations from EP.  Patient seen and examined at bedside around 3:45 PM on 2/24/2024.  He is resting comfortably in the bed.  He ambulated about 50 feet without any trouble.  No syncope, chest pain shortness of breath nausea vomiting diarrhea abdominal pain cough or fever since being in the ER.    Review of Systems:  Review of Systems   Constitutional:  Negative for chills and fever.   HENT:  Negative for ear pain  and sore throat.    Eyes:  Negative for pain and visual disturbance.   Respiratory:  Negative for cough and shortness of breath.    Cardiovascular:  Negative for chest pain and palpitations.   Gastrointestinal:  Negative for abdominal pain and vomiting.   Genitourinary:  Negative for dysuria and hematuria.   Musculoskeletal:  Negative for arthralgias and back pain.   Skin:  Negative for color change and rash.   Neurological:  Positive for syncope. Negative for seizures.   All other systems reviewed and are negative.      Past Medical and Surgical History:   Past Medical History:   Diagnosis Date    FLORENCE (acute kidney injury) ([facility])     Asthma     Cardiac murmur     COPD (chronic obstructive pulmonary disease) (HCC)     Diabetes mellitus (HCC)     Hyperlipidemia     Hypertension        Past Surgical History:   Procedure Laterality Date    APPENDECTOMY      teenager        Meds/Allergies:  all medications and allergies reviewed    Allergies: No Known Allergies    Social History:  Marital Status:   Substance Use History:   Social History     Substance and Sexual Activity   Alcohol Use Yes    Comment: occassional     Social History     Tobacco Use   Smoking Status Some Days    Current packs/day: 0.25    Average packs/day: 0.3 packs/day for 25.0 years (6.3 ttl pk-yrs)    Types: Cigarettes   Smokeless Tobacco Never     Social History     Substance and Sexual Activity   Drug Use No       Family History:  Family History   Problem Relation Age of Onset    Hypertension Mother     Hypertension Father        Physical Exam:   Vitals:   Blood Pressure: 144/89 (02/24/24 1530)  Pulse: 96 (02/24/24 1530)  Temperature: 97.6 °F (36.4 °C) (02/24/24 1530)  Temp Source: Oral (02/24/24 1530)  Respirations: 22 (02/24/24 1530)  SpO2: 97 % (02/24/24 1530)    Physical Exam General- Awake, alert and oriented x 3, looks comfortable  HEENT- Normocephalic, atraumatic, oral mucosa- moist  Neck- Supple, No carotid bruit, no JVD  CVS- Normal  S1/ S2, Regular rate and rhythm, No murmur, No edema  Respiratory system- B/L clear breath sounds, no wheezing  Abdomen- Soft, Non distended, no tenderness, Bowel sound- present 4 quads  Genitourinary- No suprapubic tenderness, No CVA tenderness  Skin- No new bruise or rash  Musculoskeletal- No gross deformity  Psych- No acute psychosis  CNS- CN II- XII grossly intact, No acute focal neurologic deficit noted      Additional Data:   Lab Results:    Results from last 7 days   Lab Units 02/24/24  1534 02/23/24  1926   WBC Thousand/uL 8.39 8.25   HEMOGLOBIN g/dL 13.9 14.1   HEMATOCRIT % 41.3 41.8   PLATELETS Thousands/uL 236 255   NEUTROS PCT %  --  43   LYMPHS PCT %  --  45*   MONOS PCT %  --  9   EOS PCT %  --  3     Results from last 7 days   Lab Units 02/24/24  1534   SODIUM mmol/L 133*   POTASSIUM mmol/L 4.1   CHLORIDE mmol/L 101   CO2 mmol/L 22   BUN mg/dL 12   CREATININE mg/dL 0.75   ANION GAP mmol/L 10   CALCIUM mg/dL 9.2   ALBUMIN g/dL 3.7   TOTAL BILIRUBIN mg/dL 0.46   ALK PHOS U/L 76   ALT U/L 27   AST U/L 15   GLUCOSE RANDOM mg/dL 293*             Lab Results   Component Value Date/Time    HGBA1C 9.2 (H) 09/19/2023 02:02 PM    HGBA1C 9.4 (H) 09/03/2023 04:25 AM    HGBA1C 9.3 (H) 08/01/2023 04:10 PM    HGBA1C 10.9 (H) 07/28/2022 02:11 PM     Results from last 7 days   Lab Units 02/24/24  1533   POC GLUCOSE mg/dl 268*           Imaging: Personally reviewed, no pneumonia.  Final results pending  X-ray chest 1 view portable    (Results Pending)       Telemetry, Pathology, and Other Studies Reviewed on Admission:   Telemetry reviewed: Normal sinus rhythm noted, no arrhythmia since presentation to the ER noted on telemetry    ** Please Note: This note may have been constructed using a voice recognition system. **

## 2024-02-24 NOTE — ASSESSMENT & PLAN NOTE
Currently smokes, less than 1/2 ppd  Nicotine patch  Counseled on smoking cessation >3 min.  Patient verbalized understanding

## 2024-02-24 NOTE — ASSESSMENT & PLAN NOTE
History of  Continue with Lipitor and Fenofibrate  Appears he had been ordered Repatha as well, unsure if he has been taking this or not.  Continue outpatient follow-up

## 2024-02-24 NOTE — ASSESSMENT & PLAN NOTE
1.7 today  Ordered 2 g IV magnesium  Recheck in a.m.  Potassium stable  Continue to monitor on telemetry

## 2024-02-24 NOTE — ASSESSMENT & PLAN NOTE
Patient presented to the ED with complaints of palpitations, that he described as a feeling like his heart stopped, with chest discomfort.  Patient states that he has had syncopal episodes as well.  Patient was instructed to come to the ER because his loop recorder was noting sinus pauses for up to 5 seconds.  He is currently awaiting transfer to [facility] for EP evaluation.  Monitor on telemetry, may not be off telemetry for any reason.  Monitor electrolytes - keep K >4, Mag >2

## 2024-02-24 NOTE — ASSESSMENT & PLAN NOTE
As per patient he uses 2 units/h of basal insulin, his carb ratio is 1 unit of insulin per 7 g of carbohydrate  He ran out of his insulin in his pump.  Will switch to subcutaneous insulin while hospitalized  Start on 10 units Lantus, 3-3-3 Premeal Humalog, sliding scale insulin and carb controlled diet  Chronic, uncontrolled, as evidenced by a hemoglobin A1C of 9.2 from 9/23  Ozempic had been ordered and an Endocrinology consult placed.  Monitor blood glucose AC/HS  Hypoglycemia protocol

## 2024-02-24 NOTE — ED PROVIDER NOTES
"History  Chief Complaint   Patient presents with    Chest Pain     Pt states over the past couple of days he felt his heart \"stop\", began with chest pain today. Advised by cardiology to come in, loop recorder placed 2 years ago      58-year-old male is sent in to emergency department by cardiology, Dr. Ramos.  This is because of concerning loop recorder readings.  This patient is having sinus pauses for up to 5 seconds multiple times a day and he was advised presentation to the emergency department for cardiac evaluation and admission so he can have a pacer placed.  Patient can feel the palpitations, can feel the sinus pause and it is distressing to him.  But he denies chest pain, denies lightheadedness.  He admits to some shortness of breath with a sinus pause.  No diaphoresis, nausea, vomiting.      Chest Pain  Associated symptoms: palpitations and shortness of breath    Associated symptoms: no abdominal pain, no back pain, no diaphoresis, no dizziness, no fever, no headache, no nausea and not vomiting        Prior to Admission Medications   Prescriptions Last Dose Informant Patient Reported? Taking?   Evolocumab 140 MG/ML SOAJ   Yes No   Sig: Inject 140 mg under the skin every 14 (fourteen) days   Insulin Pen Needle (PEN NEEDLES 29GX1/2\") 29G X 12MM MISC Past Month Self Yes Yes   Si Device by Does not apply route   Insulin Syringe-Needle U-100 31G X 1/4\" 0.5 ML MISC Past Month Self Yes Yes   Si Syringe by Does not apply route   Lancet Devices (LANCING DEVICE) MISC Past Month Self Yes Yes   Sig: Inject 1 Device under the skin   Riboflavin (Vitamin B-2) 50 MG TABS Past Week  Yes Yes   Sig: Take 1 tablet by mouth daily   albuterol (PROVENTIL HFA,VENTOLIN HFA) 90 mcg/act inhaler Past Week Self No Yes   Sig: Inhale 2 puffs every 4 (four) hours as needed for wheezing   atorvastatin (LIPITOR) 40 mg tablet Past Week Self Yes Yes   Sig: TAKE 1 TABLET BY MOUTH EVERY DAY AT NIGHT   fenofibrate (TRIGLIDE) 160 MG " tablet Past Week  Yes Yes   Sig: Take 160 mg by mouth daily Not taking   insulin glulisine (APIDRA) 100 units/mL injection   Yes Yes   Sig: TO BE USED VIA INSULIN PUMP-- UP  UNITS/DAY,   lisinopril (ZESTRIL) 20 mg tablet 2/23/2024 Self Yes Yes   Sig: Take 20 mg by mouth daily   semaglutide, 0.25 or 0.5 mg/dose, (Ozempic, 0.25 or 0.5 MG/DOSE,) 2 mg/3 mL injection pen Past Week  Yes Yes   Sig: Inject 0.5 mg under the skin Once a week   sildenafil (VIAGRA) 100 mg tablet More than a month  Yes No   Sig: Take 100 mg by mouth      Facility-Administered Medications: None       Past Medical History:   Diagnosis Date    FLORENCE (acute kidney injury) ([facility])     Asthma     Cardiac murmur     COPD (chronic obstructive pulmonary disease) (HCC)     Diabetes mellitus (HCC)     Hyperlipidemia     Hypertension        Past Surgical History:   Procedure Laterality Date    APPENDECTOMY      teenager        Family History   Problem Relation Age of Onset    Hypertension Mother     Hypertension Father      I have reviewed and agree with the history as documented.    E-Cigarette/Vaping    E-Cigarette Use Never User      E-Cigarette/Vaping Substances    Nicotine No     THC No     CBD No     Flavoring No     Other No     Unknown No      Social History     Tobacco Use    Smoking status: Some Days     Current packs/day: 0.25     Average packs/day: 0.3 packs/day for 25.0 years (6.3 ttl pk-yrs)     Types: Cigarettes    Smokeless tobacco: Never   Vaping Use    Vaping status: Never Used   Substance Use Topics    Alcohol use: Yes     Comment: occassional    Drug use: No       Review of Systems   Constitutional:  Negative for chills, diaphoresis and fever.   Respiratory:  Positive for shortness of breath. Negative for chest tightness.    Cardiovascular:  Positive for palpitations. Negative for chest pain and leg swelling.   Gastrointestinal:  Negative for abdominal pain, nausea and vomiting.   Musculoskeletal:  Negative for back pain and neck pain.    Skin:  Negative for wound.   Neurological:  Negative for dizziness and headaches.       Physical Exam  Physical Exam  Vitals reviewed.   Constitutional:       General: He is not in acute distress.     Appearance: He is well-developed. He is not ill-appearing, toxic-appearing or diaphoretic.   HENT:      Head: Normocephalic and atraumatic.   Eyes:      General: No scleral icterus.        Right eye: No discharge.         Left eye: No discharge.      Conjunctiva/sclera: Conjunctivae normal.      Pupils: Pupils are equal, round, and reactive to light.   Neck:      Vascular: No JVD.   Cardiovascular:      Rate and Rhythm: Normal rate and regular rhythm.      Heart sounds: Normal heart sounds. No murmur heard.     No friction rub. No gallop.   Pulmonary:      Effort: Pulmonary effort is normal. No respiratory distress.      Breath sounds: Normal breath sounds. No wheezing, rhonchi or rales.   Chest:      Chest wall: No tenderness.   Abdominal:      General: Bowel sounds are normal. There is no distension.      Palpations: Abdomen is soft.      Tenderness: There is no abdominal tenderness. There is no guarding or rebound.   Musculoskeletal:         General: No tenderness or deformity. Normal range of motion.      Cervical back: Normal range of motion and neck supple.      Right lower leg: No tenderness. No edema.      Left lower leg: No tenderness. No edema.   Skin:     General: Skin is warm and dry.      Coloration: Skin is not pale.      Findings: No erythema or rash.   Neurological:      Mental Status: He is alert and oriented to person, place, and time.      Cranial Nerves: No cranial nerve deficit.   Psychiatric:         Behavior: Behavior normal.         Vital Signs  ED Triage Vitals   Temperature Pulse Respirations Blood Pressure SpO2   02/23/24 1859 02/23/24 1859 02/23/24 1859 02/23/24 1859 02/23/24 1859   (!) 97.4 °F (36.3 °C) 81 18 (!) 194/89 99 %      Temp Source Heart Rate Source Patient Position -  Orthostatic VS BP Location FiO2 (%)   02/23/24 1859 02/23/24 1859 02/24/24 0530 02/23/24 1859 --   Temporal Monitor Lying Left arm       Pain Score       02/23/24 1859       4           Vitals:    02/24/24 1530 02/24/24 1734 02/25/24 0100 02/25/24 0808   BP: 144/89 135/88 130/80 138/80   Pulse: 96  98 89   Patient Position - Orthostatic VS: Lying   Lying         Visual Acuity      ED Medications  Medications   hydrALAZINE (APRESOLINE) injection 10 mg (10 mg Intravenous Given 2/23/24 1932)   insulin glargine (LANTUS) subcutaneous injection 10 Units 0.1 mL (10 Units Subcutaneous Given 2/24/24 1717)   magnesium sulfate 2 g/50 mL IVPB (premix) 2 g (0 g Intravenous Stopped 2/24/24 1934)       Diagnostic Studies  Results Reviewed       Procedure Component Value Units Date/Time    Fingerstick Glucose (POCT) [[id number]]  (Abnormal) Collected: 02/25/24 1101    Lab Status: Final result Updated: 02/25/24 1102     POC Glucose 269 mg/dl     Fingerstick Glucose (POCT) [[id number]]  (Abnormal) Collected: 02/25/24 0756    Lab Status: Final result Updated: 02/25/24 0757     POC Glucose 212 mg/dl     Fingerstick Glucose (POCT) [[id number]]  (Abnormal) Collected: 02/24/24 2114    Lab Status: Final result Updated: 02/24/24 2115     POC Glucose 265 mg/dl     Fingerstick Glucose (POCT) [[id number]]  (Abnormal) Collected: 02/24/24 1716    Lab Status: Final result Updated: 02/24/24 1719     POC Glucose 216 mg/dl     Comprehensive metabolic panel [[id number]]  (Abnormal) Collected: 02/24/24 1534    Lab Status: Final result Specimen: Blood from Arm, Right Updated: 02/24/24 1615     Sodium 133 mmol/L      Potassium 4.1 mmol/L      Chloride 101 mmol/L      CO2 22 mmol/L      ANION GAP 10 mmol/L      BUN 12 mg/dL      Creatinine 0.75 mg/dL      Glucose 293 mg/dL      Calcium 9.2 mg/dL      AST 15 U/L      ALT 27 U/L      Alkaline Phosphatase 76 U/L      Total Protein 6.8 g/dL      Albumin 3.7 g/dL      Total Bilirubin 0.46 mg/dL      eGFR 101  ml/min/1.73sq m     Narrative:      National Kidney Disease Foundation guidelines for Chronic Kidney Disease (CKD):     Stage 1 with normal or high GFR (GFR > 90 mL/min/1.73 square meters)    Stage 2 Mild CKD (GFR = 60-89 mL/min/1.73 square meters)    Stage 3A Moderate CKD (GFR = 45-59 mL/min/1.73 square meters)    Stage 3B Moderate CKD (GFR = 30-44 mL/min/1.73 square meters)    Stage 4 Severe CKD (GFR = 15-29 mL/min/1.73 square meters)    Stage 5 End Stage CKD (GFR <15 mL/min/1.73 square meters)  Note: GFR calculation is accurate only with a steady state creatinine    Magnesium [[id number]]  (Abnormal) Collected: 02/24/24 1534    Lab Status: Final result Specimen: Blood from Arm, Right Updated: 02/24/24 1614     Magnesium 1.7 mg/dL     CBC [[id number]]  (Normal) Collected: 02/24/24 1534    Lab Status: Final result Specimen: Blood from Arm, Right Updated: 02/24/24 1546     WBC 8.39 Thousand/uL      RBC 4.79 Million/uL      Hemoglobin 13.9 g/dL      Hematocrit 41.3 %      MCV 86 fL      MCH 29.0 pg      MCHC 33.7 g/dL      RDW 14.4 %      Platelets 236 Thousands/uL      MPV 9.7 fL     Fingerstick Glucose (POCT) [[id number]]  (Abnormal) Collected: 02/24/24 1533    Lab Status: Final result Updated: 02/24/24 1534     POC Glucose 268 mg/dl     HS Troponin I 4hr [[id number]]  (Normal) Collected: 02/23/24 2325    Lab Status: Final result Specimen: Blood from Arm, Right Updated: 02/24/24 0003     hs TnI 4hr 16 ng/L      Delta 4hr hsTnI -1 ng/L     HS Troponin I 2hr [[id number]]  (Normal) Collected: 02/23/24 2115    Lab Status: Final result Specimen: Blood from Arm, Right Updated: 02/23/24 2152     hs TnI 2hr 15 ng/L      Delta 2hr hsTnI -2 ng/L     HS Troponin 0hr (reflex protocol) [[id number]]  (Normal) Collected: 02/23/24 1926    Lab Status: Final result Specimen: Blood from Arm, Right Updated: 02/23/24 2005     hs TnI 0hr 17 ng/L     Comprehensive metabolic panel [[id number]]  (Abnormal) Collected: 02/23/24 1926    Lab Status:  Final result Specimen: Blood from Arm, Right Updated: 02/23/24 2000     Sodium 135 mmol/L      Potassium 3.7 mmol/L      Chloride 104 mmol/L      CO2 23 mmol/L      ANION GAP 8 mmol/L      BUN 10 mg/dL      Creatinine 0.67 mg/dL      Glucose 200 mg/dL      Calcium 9.3 mg/dL      AST 19 U/L      ALT 33 U/L      Alkaline Phosphatase 81 U/L      Total Protein 7.4 g/dL      Albumin 4.0 g/dL      Total Bilirubin 0.48 mg/dL      eGFR 106 ml/min/1.73sq m     Narrative:      National Kidney Disease Foundation guidelines for Chronic Kidney Disease (CKD):     Stage 1 with normal or high GFR (GFR > 90 mL/min/1.73 square meters)    Stage 2 Mild CKD (GFR = 60-89 mL/min/1.73 square meters)    Stage 3A Moderate CKD (GFR = 45-59 mL/min/1.73 square meters)    Stage 3B Moderate CKD (GFR = 30-44 mL/min/1.73 square meters)    Stage 4 Severe CKD (GFR = 15-29 mL/min/1.73 square meters)    Stage 5 End Stage CKD (GFR <15 mL/min/1.73 square meters)  Note: GFR calculation is accurate only with a steady state creatinine    Magnesium [[id number]]  (Abnormal) Collected: 02/23/24 1926    Lab Status: Final result Specimen: Blood from Arm, Right Updated: 02/23/24 2000     Magnesium 1.8 mg/dL     CBC and differential [[id number]]  (Abnormal) Collected: 02/23/24 1926    Lab Status: Final result Specimen: Blood from Arm, Right Updated: 02/23/24 1936     WBC 8.25 Thousand/uL      RBC 4.89 Million/uL      Hemoglobin 14.1 g/dL      Hematocrit 41.8 %      MCV 86 fL      MCH 28.8 pg      MCHC 33.7 g/dL      RDW 14.4 %      MPV 9.3 fL      Platelets 255 Thousands/uL      nRBC 0 /100 WBCs      Neutrophils Relative 43 %      Immat GRANS % 0 %      Lymphocytes Relative 45 %      Monocytes Relative 9 %      Eosinophils Relative 3 %      Basophils Relative 0 %      Neutrophils Absolute 3.58 Thousands/µL      Immature Grans Absolute 0.03 Thousand/uL      Lymphocytes Absolute 3.65 Thousands/µL      Monocytes Absolute 0.73 Thousand/µL      Eosinophils Absolute 0.23  Thousand/µL      Basophils Absolute 0.03 Thousands/µL                    X-ray chest 1 view portable   Final Result by Raymond Nixon MD (02/25 0145)      No acute cardiopulmonary disease.            Workstation performed: [id number]                    Procedures  Procedures         ED Course                                             Medical Decision Making  Sinus pause identified on loop recorder.  Cardiac workup performed and normal.  Patient will require admission for pacer placement by cardiology.    Amount and/or Complexity of Data Reviewed  Labs: ordered.  Radiology: ordered.    Risk  Prescription drug management.  Decision regarding hospitalization.             Disposition  Final diagnoses:   Sinus pause   Syncope     Time reflects when diagnosis was documented in both MDM as applicable and the Disposition within this note       Time User Action Codes Description Comment    2/23/2024  8:57 PM Monalisa Cole Add [I45.5] Sinus pause     2/24/2024  3:22 PM Jose Eduardo Ybarra Add [R55] Syncope           ED Disposition       ED Disposition   Admit    Condition   Stable    Date/Time   Sun Feb 25, 2024 11:01 AM    Comment   Nahid DÍAZ Ailyn should be transferred out to [facility].               MD Documentation      Flowsheet Row Most Recent Value   Patient Condition The patient has been stabilized such that within reasonable medical probability, no material deterioration of the patient condition or the condition of the unborn child(joaquin) is likely to result from the transfer   Reason for Transfer Level of Care needed not available at this facility   Benefits of Transfer Specialized equipment and/or services available at the receiving facility (Include comment)________________________   Risks of Transfer Potential for delay in receiving treatment   Accepting Physician Dr. Russo   Accepting Facility Name, City & State  [facility]    (Name & Tel number) [facility]   Sending MD Jose Eduardo Ybarra PA-C/Dr. Lepe  "  Provider Certification General risk, such as traffic hazards, adverse weather conditions, rough terrain or turbulence, possible failure of equipment (including vehicle or aircraft), or consequences of actions of persons outside the control of the transport personnel          RN Documentation      Flowsheet Row Most Recent Value   Accepting Facility Name, City & State  [facility]    (Name & Tel number) [facility]          Follow-up Information    None         Discharge Medication List as of 2/25/2024  1:39 PM        CONTINUE these medications which have NOT CHANGED    Details   albuterol (PROVENTIL HFA,VENTOLIN HFA) 90 mcg/act inhaler Inhale 2 puffs every 4 (four) hours as needed for wheezing, Starting Mon 9/17/2018, Print      atorvastatin (LIPITOR) 40 mg tablet TAKE 1 TABLET BY MOUTH EVERY DAY AT NIGHT, Historical Med      fenofibrate (TRIGLIDE) 160 MG tablet Take 160 mg by mouth daily Not taking, Starting Mon 3/22/2021, Historical Med      insulin glulisine (APIDRA) 100 units/mL injection TO BE USED VIA INSULIN PUMP-- UP  UNITS/DAY,, Historical Med      Insulin Pen Needle (PEN NEEDLES 29GX1/2\") 29G X 12MM MISC 1 Device by Does not apply route, Starting Thu 11/30/2017, Historical Med      Insulin Syringe-Needle U-100 31G X 1/4\" 0.5 ML MISC 1 Syringe by Does not apply route, Starting Mon 10/8/2018, Historical Med      Lancet Devices (LANCING DEVICE) MISC Inject 1 Device under the skin, Starting Mon 3/5/2018, Historical Med      lisinopril (ZESTRIL) 20 mg tablet Take 20 mg by mouth daily, Historical Med      Riboflavin (Vitamin B-2) 50 MG TABS Take 1 tablet by mouth daily, Starting Tue 1/2/2024, Historical Med      semaglutide, 0.25 or 0.5 mg/dose, (Ozempic, 0.25 or 0.5 MG/DOSE,) 2 mg/3 mL injection pen Inject 0.5 mg under the skin Once a week, Starting Tue 1/2/2024, Historical Med      Evolocumab 140 MG/ML SOAJ Inject 140 mg under the skin every 14 (fourteen) days, Starting Wed 9/27/2023, Historical " Med      sildenafil (VIAGRA) 100 mg tablet Take 100 mg by mouth, Starting Mon 8/1/2022, Until Sat 2/24/2024 at 2359, Historical Med             No discharge procedures on file.    PDMP Review         Value Time User    PDMP Reviewed  Yes 9/22/2022  9:10 PM Lobo Gentile PA-C            ED Provider  Electronically Signed by             Monalisa Cole DO  02/27/24 [id number]

## 2024-02-24 NOTE — EMTALA/ACUTE CARE TRANSFER
[facility] EMERGENCY DEPARTMENT  [address]  [place] [address]  Dept: [phone]      EMTALA TRANSFER CONSENT    NAME Nahid Robertson                                         1965                              MRN [id number]    I have been informed of my rights regarding examination, treatment, and transfer   by Dr. Muna Lepe DO    Benefits: Specialized equipment and/or services available at the receiving facility (Include comment)________________________    Risks: Potential for delay in receiving treatment      Consent for Transfer:  I acknowledge that my medical condition has been evaluated and explained to me by the emergency department physician or other qualified medical person and/or my attending physician, who has recommended that I be transferred to the service of  Accepting Physician: Dr. Russo at Accepting Facility Name, City & State : [facility]. The above potential benefits of such transfer, the potential risks associated with such transfer, and the probable risks of not being transferred have been explained to me, and I fully understand them.  The doctor has explained that, in my case, the benefits of transfer outweigh the risks.  I agree to be transferred.    I authorize the performance of emergency medical procedures and treatments upon me in both transit and upon arrival at the receiving facility.  Additionally, I authorize the release of any and all medical records to the receiving facility and request they be transported with me, if possible.  I understand that the safest mode of transportation during a medical emergency is an ambulance and that the Hospital advocates the use of this mode of transport. Risks of traveling to the receiving facility by car, including absence of medical control, life sustaining equipment, such as oxygen, and medical personnel has been explained to me and I fully understand them.    (AFSHIN CORRECT BOX BELOW)  [ X ]  I consent to  the stated transfer and to be transported by ambulance/helicopter.  [  ]  I consent to the stated transfer, but refuse transportation by ambulance and accept full responsibility for my transportation by car.  I understand the risks of non-ambulance transfers and I exonerate the Hospital and its staff from any deterioration in my condition that results from this refusal.    X___________________________________________    DATE  24  TIME________  Signature of patient or legally responsible individual signing on patient behalf           RELATIONSHIP TO PATIENT_________________________          Provider Certification    NAME Nahid Robertson                                         1965                              MRN [id number]    A medical screening exam was performed on the above named patient.  Based on the examination:    Condition Necessitating Transfer The encounter diagnosis was Sinus pause.    Patient Condition: The patient has been stabilized such that within reasonable medical probability, no material deterioration of the patient condition or the condition of the unborn child(joaquin) is likely to result from the transfer    Reason for Transfer: Level of Care needed not available at this facility    Transfer Requirements: Facility [facility]   Space available and qualified personnel available for treatment as acknowledged by [facility]  Agreed to accept transfer and to provide appropriate medical treatment as acknowledged by       Dr. Russo  Appropriate medical records of the examination and treatment of the patient are provided at the time of transfer   STAFF INITIAL WHEN COMPLETED _______  Transfer will be performed by qualified personnel from    and appropriate transfer equipment as required, including the use of necessary and appropriate life support measures.    Provider Certification: I have examined the patient and explained the following risks and benefits of being transferred/refusing transfer to the  patient/family:  General risk, such as traffic hazards, adverse weather conditions, rough terrain or turbulence, possible failure of equipment (including vehicle or aircraft), or consequences of actions of persons outside the control of the transport personnel      Based on these reasonable risks and benefits to the patient and/or the unborn child(joaquin), and based upon the information available at the time of the patient’s examination, I certify that the medical benefits reasonably to be expected from the provision of appropriate medical treatments at another medical facility outweigh the increasing risks, if any, to the individual’s medical condition, and in the case of labor to the unborn child, from effecting the transfer.    X____________________________________________ DATE 02/23/24        TIME_______      ORIGINAL - SEND TO MEDICAL RECORDS   COPY - SEND WITH PATIENT DURING TRANSFER

## 2024-02-24 NOTE — ED NOTES
Pt given food/drinks/warm blankets. Per provider pt can eat and drink.       Makayla Lopez RN  02/23/24 8668

## 2024-02-25 VITALS
DIASTOLIC BLOOD PRESSURE: 80 MMHG | OXYGEN SATURATION: 97 % | RESPIRATION RATE: 18 BRPM | TEMPERATURE: 97.6 F | SYSTOLIC BLOOD PRESSURE: 138 MMHG | HEART RATE: 89 BPM

## 2024-02-25 LAB
GLUCOSE SERPL-MCNC: 212 MG/DL (ref 65–140)
GLUCOSE SERPL-MCNC: 269 MG/DL (ref 65–140)

## 2024-02-25 PROCEDURE — 82948 REAGENT STRIP/BLOOD GLUCOSE: CPT

## 2024-02-25 PROCEDURE — 99223 1ST HOSP IP/OBS HIGH 75: CPT | Performed by: STUDENT IN AN ORGANIZED HEALTH CARE EDUCATION/TRAINING PROGRAM

## 2024-02-25 PROCEDURE — 96372 THER/PROPH/DIAG INJ SC/IM: CPT

## 2024-02-25 RX ORDER — INSULIN LISPRO 100 [IU]/ML
5 INJECTION, SOLUTION INTRAVENOUS; SUBCUTANEOUS
Status: DISCONTINUED | OUTPATIENT
Start: 2024-02-25 | End: 2024-02-25 | Stop reason: HOSPADM

## 2024-02-25 RX ADMIN — INSULIN LISPRO 3 UNITS: 100 INJECTION, SOLUTION INTRAVENOUS; SUBCUTANEOUS at 08:56

## 2024-02-25 RX ADMIN — INSULIN LISPRO 5 UNITS: 100 INJECTION, SOLUTION INTRAVENOUS; SUBCUTANEOUS at 12:32

## 2024-02-25 RX ADMIN — FENOFIBRATE 145 MG: 145 TABLET, FILM COATED ORAL at 08:54

## 2024-02-25 RX ADMIN — NICOTINE 1 PATCH: 14 PATCH, EXTENDED RELEASE TRANSDERMAL at 09:03

## 2024-02-25 RX ADMIN — INSULIN LISPRO 3 UNITS: 100 INJECTION, SOLUTION INTRAVENOUS; SUBCUTANEOUS at 12:33

## 2024-02-25 RX ADMIN — HEPARIN SODIUM 5000 UNITS: 5000 INJECTION INTRAVENOUS; SUBCUTANEOUS at 08:55

## 2024-02-25 RX ADMIN — INSULIN LISPRO 2 UNITS: 100 INJECTION, SOLUTION INTRAVENOUS; SUBCUTANEOUS at 08:56

## 2024-02-25 RX ADMIN — LISINOPRIL 20 MG: 10 TABLET ORAL at 08:54

## 2024-02-25 NOTE — ASSESSMENT & PLAN NOTE
Patient presented to the ED with complaints of palpitations, that he described as a feeling like his heart stopped, with chest discomfort.  Patient states that he has had syncopal episodes as well.  Patient was instructed to come to the ER because his loop recorder was noting sinus pauses for up to 5 seconds.  He is currently awaiting transfer to [facility] for EP evaluation.  Monitor on telemetry, may not be off telemetry for any reason.  Monitor electrolytes - keep K >4, Mag >2

## 2024-02-25 NOTE — ASSESSMENT & PLAN NOTE
As per patient he uses 2 units/h of basal insulin, his carb ratio is 1 unit of insulin per 7 g of carbohydrate  He ran out of his insulin in his pump.  Will switch to subcutaneous insulin while hospitalized  Ozempic had been ordered and an Endocrinology consult placed.  Monitor blood glucose AC/HS  Hypoglycemia protocol  Start on 10 units Lantus, 3-3-3 Premeal Humalog, sliding scale insulin and carb controlled diet  Increased lispro to 5 units with meals

## 2024-02-25 NOTE — QUICK NOTE
Patient wants to leave AMA. I counseled the patient that he is at risk of syncope and/or cardiac arrest. He is aware. He reports that he wants to go to [facility] because his cardiology team is located there. He currently is asymptomatic. Thus discharged AMA.

## 2024-02-26 NOTE — UTILIZATION REVIEW
NOTIFICATION OF INPATIENT ADMISSION   AUTHORIZATION REQUEST   SERVICING FACILITY:   [place] [address]  Tax ID: [id number]  NPI: [id number] ATTENDING PROVIDER:  Attending Name and NPI#: Jorge A Jesus Md [[id number]]  Address: [address] [place] [address]  Phone: [phone]     ADMISSION INFORMATION:  Place of Service: Inpatient [facility]  Place of Service Code: 21  Inpatient Admission Date/Time: 2/25/24 11:44 AM  Discharge Date/Time: 2/25/2024  2:16 PM  Admitting Diagnosis Code/Description:  Chest pain [R07.9]     UTILIZATION REVIEW CONTACT:  Norma Arndt Utilization   Network Utilization Review Department  Phone: [phone]  Fax [phone]  Email: [email or web address]  Contact for approvals/pending authorizations, clinical reviews, and discharge.     PHYSICIAN ADVISORY SERVICES:  Medical Necessity Denial & Unoz-se-Egst Review  Phone: [phone]  Fax: [phone]  Email: [email or web address]     DISCHARGE SUPPORT TEAM:  For Patients Discharge Needs & Updates  Phone: [phone] opt. 2 Fax: [phone]  Email: [email or web address]

## 2024-02-26 NOTE — UTILIZATION REVIEW
"Initial Clinical Review      ED arrival 2/23 1852    Admission: Date/Time/Statement:   Admission Orders (From admission, onward)       Ordered        02/25/24 1144  Inpatient Admission  Once            02/25/24 1102  Place in Observation  Once                           Inpatient Admission     Standing Status:   Standing     Number of Occurrences:   1     Order Specific Question:   Level of Care     Answer:   Med Surg [16]     Order Specific Question:   Estimated length of stay     Answer:   More than 2 Midnights     Order Specific Question:   Certification     Answer:   I certify that inpatient services are medically necessary for this patient for a duration of greater than two midnights. See H&P and MD Progress Notes for additional information about the patient's course of treatment.     ED Arrival Information       Expected   -    Arrival   2/23/2024 18:52    Acuity   Emergent              Means of arrival   Walk-In    Escorted by   Family Member    Service   Hospitalist    Admission type   Emergency              Arrival complaint   Chest Pain             Chief Complaint   Patient presents with    Chest Pain     Pt states over the past couple of days he felt his heart \"stop\", began with chest pain today. Advised by cardiology to come in, loop recorder placed 2 years ago        Initial Presentation: 58 y.o. male to ED from home w/  PMH of htn, dm, hld who presents with sinus pause noted on loop recorder by his outpatient cardiologist. Had 5 second pause. ED discussed case with EP at [facility] and recommended transfer for pacemaker evaluation. Was in the ED for almost 2 days . Pt admitted IP status while awaiting transfer to [facility] for sinus pause and EP evaluation . Plan for tele , monitor lytes . Keep K >4 , mg >2 . Mg was repleted in ED , recheck in am . DM Start on 10 units Lantus, 3-3-3 Premeal Humalog, sliding scale insulin and carb controlled diet. Increased lispro to 5 units with meals . HTN controlled, monitor susy " current regimen .     2/25 Pt left AMA   ED Triage Vitals   Temperature Pulse Respirations Blood Pressure SpO2   02/23/24 1859 02/23/24 1859 02/23/24 1859 02/23/24 1859 02/23/24 1859   (!) 97.4 °F (36.3 °C) 81 18 (!) 194/89 99 %      Temp Source Heart Rate Source Patient Position - Orthostatic VS BP Location FiO2 (%)   02/23/24 1859 02/23/24 1859 02/24/24 0530 02/23/24 1859 --   Temporal Monitor Lying Left arm       Pain Score       02/23/24 1859       4          Wt Readings from Last 1 Encounters:   09/03/23 83.1 kg (183 lb 3.2 oz)     Additional Vital Signs:   02/25/24 0808 -- 89 18 138/80 -- 97 % None (Room air) Lying   02/25/24 0100 -- 98 16 130/80 -- 99 % -- --   02/24/24 1734 -- -- -- 135/88 -- -- -- --   02/24/24 1530 97.6 °F (36.4 °C) 96 22 144/89 109 97 % None (Room air) Lying   02/24/24 1030 -- 83 19 -- -- 100 % -- --   02/24/24 0900 -- 87 18 177/101 Abnormal  133 98 % -- --   02/24/24 0845 -- 99 18 -- -- 99 % -- --   02/24/24 0725 -- 85 20 157/87 -- 95 % None (Room air) --   02/24/24 0700 -- 91 17 157/87 115 97 % -- --   02/24/24 0600 -- 95 19 153/72 103 97 % None (Room air) --   02/24/24 0530 -- 80 20 134/64 92 100 % None (Room air) Lying   02/24/24 0100 -- 93 20 165/87 119 97 % None (Room air) --   02/23/24 2300 -- 100 18 169/83 119 98 % None (Room air) --   02/23/24 2230 -- 102 20 165/74 107 99 % -- --   02/23/24 2215 -- 98 20 -- -- 99 % None (Room air) --   02/23/24 2200 -- 97 20 186/97 Abnormal  133 99 % -- --   02/23/24 2130 -- 90 17 186/86 Abnormal  127 99 % None (Room air) --   02/23/24 2100 -- 92 20 183/91 Abnormal  130 99 % None (Room air) --   02/23/24 2045 -- 91 20 -- -- 99 % -- --   02/23/24 2030 -- 97 20 179/84 Abnormal  121 99 % None (Room air) --   02/23/24 2015 -- 93 20 -- -- 100 % None (Room air) --   02/23/24 1930 -- 76 18 172/95 Abnormal  -- 98 % None (Room air) --   02/23/24 1915 -- 76 18 207/99 Abnormal  141 97 % --      Pertinent Labs/Diagnostic Test Results:     2/23 EKG NSR    X-ray chest 1 view portable   Final Result by Raymond Nixon MD (02/25 0145)      No acute cardiopulmonary disease.            Workstation performed: [id number]               Results from last 7 days   Lab Units 02/24/24  1534 02/23/24  1926   WBC Thousand/uL 8.39 8.25   HEMOGLOBIN g/dL 13.9 14.1   HEMATOCRIT % 41.3 41.8   PLATELETS Thousands/uL 236 255   NEUTROS ABS Thousands/µL  --  3.58         Results from last 7 days   Lab Units 02/24/24  1534 02/23/24  1926   SODIUM mmol/L 133* 135   POTASSIUM mmol/L 4.1 3.7   CHLORIDE mmol/L 101 104   CO2 mmol/L 22 23   ANION GAP mmol/L 10 8   BUN mg/dL 12 10   CREATININE mg/dL 0.75 0.67   EGFR ml/min/1.73sq m 101 106   CALCIUM mg/dL 9.2 9.3   MAGNESIUM mg/dL 1.7* 1.8*     Results from last 7 days   Lab Units 02/24/24  1534 02/23/24 1926   AST U/L 15 19   ALT U/L 27 33   ALK PHOS U/L 76 81   TOTAL PROTEIN g/dL 6.8 7.4   ALBUMIN g/dL 3.7 4.0   TOTAL BILIRUBIN mg/dL 0.46 0.48     Results from last 7 days   Lab Units 02/25/24  1101 02/25/24  0756 02/24/24 2114 02/24/24  1716 02/24/24  1533   POC GLUCOSE mg/dl 269* 212* 265* 216* 268*     Results from last 7 days   Lab Units 02/24/24  1534 02/23/24  1926   GLUCOSE RANDOM mg/dL 293* 200*         BETA-HYDROXYBUTYRATE   Date Value Ref Range Status   08/31/2023 0.4 <0.6 mmol/L Final   06/19/2021 0.1 <0.6 mmol/L Final         Results from last 7 days   Lab Units 02/23/24  2325 02/23/24 2115 02/23/24 1926   HS TNI 0HR ng/L  --   --  17   HS TNI 2HR ng/L  --  15  --    HSTNI D2 ng/L  --  -2  --    HS TNI 4HR ng/L 16  --   --    HSTNI D4 ng/L -1  --   --          ED Treatment:   Medication Administration from 02/23/2024 1852 to 02/26/2024 0708         Date/Time Order Dose Route Action     02/23/2024 1932 EST hydrALAZINE (APRESOLINE) injection 10 mg 10 mg Intravenous Given     02/24/2024 1734 EST atorvastatin (LIPITOR) tablet 40 mg 40 mg Oral Given     02/25/2024 0854 EST fenofibrate (TRICOR) tablet 145 mg 145 mg Oral Given      02/24/2024 1754 EST fenofibrate (TRICOR) tablet 145 mg 145 mg Oral Given     02/25/2024 0903 EST nicotine (NICODERM CQ) 14 mg/24hr TD 24 hr patch 1 patch 1 patch Transdermal Medication Applied     02/25/2024 0902 EST nicotine (NICODERM CQ) 14 mg/24hr TD 24 hr patch 1 patch 1 patch Transdermal Patch Removed     02/24/2024 1734 EST nicotine (NICODERM CQ) 14 mg/24hr TD 24 hr patch 1 patch 1 patch Transdermal Medication Applied     02/25/2024 0855 EST heparin (porcine) subcutaneous injection 5,000 Units 5,000 Units Subcutaneous Given     02/24/2024 2327 EST heparin (porcine) subcutaneous injection 5,000 Units 5,000 Units Subcutaneous Given     02/24/2024 1739 EST heparin (porcine) subcutaneous injection 5,000 Units 5,000 Units Subcutaneous Given     02/24/2024 2327 EST insulin lispro (HumaLOG) 100 units/mL subcutaneous injection 1-6 Units 3 Units Subcutaneous Given     02/25/2024 0854 EST lisinopril (ZESTRIL) tablet 20 mg 20 mg Oral Given     02/24/2024 1734 EST lisinopril (ZESTRIL) tablet 20 mg 20 mg Oral Given     02/24/2024 1717 EST insulin glargine (LANTUS) subcutaneous injection 10 Units 0.1 mL 10 Units Subcutaneous Given     02/25/2024 0856 EST insulin lispro (HumaLOG) 100 units/mL subcutaneous injection 3 Units 3 Units Subcutaneous Given     02/24/2024 1902 EST insulin lispro (HumaLOG) 100 units/mL subcutaneous injection 3 Units 3 Units Subcutaneous Given     02/24/2024 1734 EST magnesium sulfate 2 g/50 mL IVPB (premix) 2 g 2 g Intravenous New Bag     02/25/2024 1233 EST insulin lispro (HumaLOG) 100 units/mL subcutaneous injection 1-6 Units 3 Units Subcutaneous Given     02/25/2024 0856 EST insulin lispro (HumaLOG) 100 units/mL subcutaneous injection 1-6 Units 2 Units Subcutaneous Given     02/25/2024 1232 EST insulin lispro (HumaLOG) 100 units/mL subcutaneous injection 5 Units 5 Units Subcutaneous Given          Past Medical History:   Diagnosis Date    FLORENCE (acute kidney injury) (HCC)     Asthma     Cardiac  murmur     COPD (chronic obstructive pulmonary disease) (HCC)     Diabetes mellitus (HCC)     Hyperlipidemia     Hypertension      Present on Admission:   Sinus pause   Hyperlipidemia   Hypertension   Type 2 diabetes mellitus (HCC)   Tobacco use      Admitting Diagnosis: Chest pain [R07.9]  Age/Sex: 58 y.o. male  Admission Orders:  Scheduled Medications:  Insulin humalog 3 3 TID w/ meals   Lantus insulin 10 u once  Zestril 20 mg qd  Lantus insulin 5 u HS   Humalog 1-6 u HS   Humalog 1-6 u TID ac  Sq heparin q8hr   Nicoderm patch qd  Tricor  145 mg qd  Lipitor 40 mg qd  Albuterol 2 puffs q4hr prn     Tele   Fingerstick ac and hs   Up and OOB   Reg diet   Cont pulse ox         IP CONSULT TO INTERNAL MEDICINE    Network Utilization Review Department  ATTENTION: Please call with any questions or concerns to [phone] and carefully listen to the prompts so that you are directed to the right person. All voicemails are confidential.   For Discharge needs, contact Care Management DC Support Team at [phone] opt. 2  Send all requests for admission clinical reviews, approved or denied determinations and any other requests to dedicated fax number below belonging to the campus where the patient is receiving treatment. List of dedicated fax numbers for the Facilities:  FACILITY NAME UR FAX NUMBER   ADMISSION DENIALS (Administrative/Medical Necessity) [phone]   DISCHARGE SUPPORT TEAM (NETWORK) [phone]   PARENT CHILD HEALTH (Maternity/NICU/Pediatrics) [phone]   [facility] [phone]   [facility] [phone]   [facility], [facility] [phone]   [facility] [phone]   [facility] [phone]   [facility] [phone]   [facility] [phone]   [facility] [phone]   [facility]  [facility] [phone]   [facility] [phone]   [facility] [phone]   [facility] [phone]

## 2024-09-03 ENCOUNTER — HOSPITAL ENCOUNTER (EMERGENCY)
Facility: HOSPITAL | Age: 59
Discharge: HOME | End: 2024-09-03
Attending: EMERGENCY MEDICINE
Payer: COMMERCIAL

## 2024-09-03 ENCOUNTER — APPOINTMENT (EMERGENCY)
Dept: CT IMAGING | Facility: HOSPITAL | Age: 59
End: 2024-09-03
Payer: COMMERCIAL

## 2024-09-03 VITALS
HEIGHT: 71 IN | BODY MASS INDEX: 25.2 KG/M2 | TEMPERATURE: 98 F | DIASTOLIC BLOOD PRESSURE: 89 MMHG | HEART RATE: 76 BPM | OXYGEN SATURATION: 95 % | WEIGHT: 180 LBS | SYSTOLIC BLOOD PRESSURE: 151 MMHG | RESPIRATION RATE: 18 BRPM

## 2024-09-03 DIAGNOSIS — K29.70 GASTRITIS: Primary | ICD-10-CM

## 2024-09-03 LAB
ALBUMIN SERPL BCG-MCNC: 4.3 G/DL (ref 3.5–5)
ALP SERPL-CCNC: 106 U/L (ref 34–104)
ALT SERPL W P-5'-P-CCNC: 34 U/L (ref 7–52)
ANION GAP SERPL CALCULATED.3IONS-SCNC: 13 MMOL/L (ref 4–13)
AST SERPL W P-5'-P-CCNC: 18 U/L (ref 13–39)
BASOPHILS # BLD AUTO: 0.02 THOUSANDS/ÂΜL (ref 0–0.1)
BASOPHILS NFR BLD AUTO: 0 % (ref 0–1)
BILIRUB SERPL-MCNC: 0.64 MG/DL (ref 0.2–1)
BUN SERPL-MCNC: 28 MG/DL (ref 5–25)
CALCIUM SERPL-MCNC: 9 MG/DL (ref 8.4–10.2)
CARDIAC TROPONIN I PNL SERPL HS: 7 NG/L
CHLORIDE SERPL-SCNC: 97 MMOL/L (ref 96–108)
CO2 SERPL-SCNC: 26 MMOL/L (ref 21–32)
CREAT SERPL-MCNC: 1.12 MG/DL (ref 0.6–1.3)
EOSINOPHIL # BLD AUTO: 0.1 THOUSAND/ÂΜL (ref 0–0.61)
EOSINOPHIL NFR BLD AUTO: 1 % (ref 0–6)
ERYTHROCYTE [DISTWIDTH] IN BLOOD BY AUTOMATED COUNT: 13.8 % (ref 11.6–15.1)
GFR SERPL CREATININE-BSD FRML MDRD: 72 ML/MIN/1.73SQ M
GLUCOSE SERPL-MCNC: 259 MG/DL (ref 65–140)
HCT VFR BLD AUTO: 45.2 % (ref 36.5–49.3)
HGB BLD-MCNC: 14.5 G/DL (ref 12–17)
IMM GRANULOCYTES # BLD AUTO: 0.04 THOUSAND/UL (ref 0–0.2)
IMM GRANULOCYTES NFR BLD AUTO: 0 % (ref 0–2)
LACTATE SERPL-SCNC: 2.7 MMOL/L (ref 0.5–2)
LIPASE SERPL-CCNC: 27 U/L (ref 11–82)
LYMPHOCYTES # BLD AUTO: 2.64 THOUSANDS/ÂΜL (ref 0.6–4.47)
LYMPHOCYTES NFR BLD AUTO: 25 % (ref 14–44)
MCH RBC QN AUTO: 27.9 PG (ref 26.8–34.3)
MCHC RBC AUTO-ENTMCNC: 32.1 G/DL (ref 31.4–37.4)
MCV RBC AUTO: 87 FL (ref 82–98)
MONOCYTES # BLD AUTO: 0.96 THOUSAND/ÂΜL (ref 0.17–1.22)
MONOCYTES NFR BLD AUTO: 9 % (ref 4–12)
NEUTROPHILS # BLD AUTO: 6.99 THOUSANDS/ÂΜL (ref 1.85–7.62)
NEUTS SEG NFR BLD AUTO: 65 % (ref 43–75)
NRBC BLD AUTO-RTO: 0 /100 WBCS
PLATELET # BLD AUTO: 244 THOUSANDS/UL (ref 149–390)
PMV BLD AUTO: 9.1 FL (ref 8.9–12.7)
POTASSIUM SERPL-SCNC: 3.3 MMOL/L (ref 3.5–5.3)
PROT SERPL-MCNC: 8 G/DL (ref 6.4–8.4)
RBC # BLD AUTO: 5.2 MILLION/UL (ref 3.88–5.62)
SODIUM SERPL-SCNC: 136 MMOL/L (ref 135–147)
WBC # BLD AUTO: 10.75 THOUSAND/UL (ref 4.31–10.16)

## 2024-09-03 PROCEDURE — 96374 THER/PROPH/DIAG INJ IV PUSH: CPT

## 2024-09-03 PROCEDURE — 80053 COMPREHEN METABOLIC PANEL: CPT | Performed by: EMERGENCY MEDICINE

## 2024-09-03 PROCEDURE — 36415 COLL VENOUS BLD VENIPUNCTURE: CPT | Performed by: EMERGENCY MEDICINE

## 2024-09-03 PROCEDURE — 96361 HYDRATE IV INFUSION ADD-ON: CPT

## 2024-09-03 PROCEDURE — 74177 CT ABD & PELVIS W/CONTRAST: CPT

## 2024-09-03 PROCEDURE — 93005 ELECTROCARDIOGRAM TRACING: CPT

## 2024-09-03 PROCEDURE — 99284 EMERGENCY DEPT VISIT MOD MDM: CPT | Performed by: EMERGENCY MEDICINE

## 2024-09-03 PROCEDURE — 83690 ASSAY OF LIPASE: CPT | Performed by: EMERGENCY MEDICINE

## 2024-09-03 PROCEDURE — 85025 COMPLETE CBC W/AUTO DIFF WBC: CPT | Performed by: EMERGENCY MEDICINE

## 2024-09-03 PROCEDURE — 99284 EMERGENCY DEPT VISIT MOD MDM: CPT

## 2024-09-03 PROCEDURE — 83605 ASSAY OF LACTIC ACID: CPT | Performed by: EMERGENCY MEDICINE

## 2024-09-03 PROCEDURE — 84484 ASSAY OF TROPONIN QUANT: CPT | Performed by: EMERGENCY MEDICINE

## 2024-09-03 RX ORDER — SUCRALFATE 1 G/1
1 TABLET ORAL 4 TIMES DAILY
Qty: 28 TABLET | Refills: 0 | Status: SHIPPED | OUTPATIENT
Start: 2024-09-03 | End: 2024-09-10

## 2024-09-03 RX ORDER — ONDANSETRON 4 MG/1
1 TABLET, ORALLY DISINTEGRATING ORAL ONCE
Status: COMPLETED | OUTPATIENT
Start: 2024-09-03 | End: 2024-09-03

## 2024-09-03 RX ORDER — HYDROMORPHONE HCL/PF 1 MG/ML
0.5 SYRINGE (ML) INJECTION ONCE
Status: COMPLETED | OUTPATIENT
Start: 2024-09-03 | End: 2024-09-03

## 2024-09-03 RX ORDER — ONDANSETRON 4 MG/1
4 TABLET, FILM COATED ORAL EVERY 8 HOURS PRN
Qty: 12 TABLET | Refills: 0 | Status: SHIPPED | OUTPATIENT
Start: 2024-09-03

## 2024-09-03 RX ORDER — FAMOTIDINE 20 MG/1
20 TABLET, FILM COATED ORAL 2 TIMES DAILY
Qty: 14 TABLET | Refills: 0 | Status: SHIPPED | OUTPATIENT
Start: 2024-09-03

## 2024-09-03 RX ADMIN — SODIUM CHLORIDE 1000 ML: 0.9 INJECTION, SOLUTION INTRAVENOUS at 12:37

## 2024-09-03 RX ADMIN — IOHEXOL 100 ML: 350 INJECTION, SOLUTION INTRAVENOUS at 13:08

## 2024-09-03 RX ADMIN — HYDROMORPHONE HYDROCHLORIDE 0.5 MG: 1 INJECTION, SOLUTION INTRAMUSCULAR; INTRAVENOUS; SUBCUTANEOUS at 12:48

## 2024-09-03 NOTE — ED PROVIDER NOTES
"Final Diagnosis:  1. Gastritis        Chief Complaint   Patient presents with    Abdominal Pain     Patient reports three days of n/v/d and upper abdominal pain     HPI  Patient presents for evaluation of nausea, vomiting, epigastric abdominal pain.  Patient states has been going on for the last 2 to 3 days.  This is worse after eating as as so he has had a decreased appetite.  States that he has not had anything to eat in about 2 days.  He states he has also had some runny bowel movements.  Denies any history of abdominal surgeries with exception of an appendectomy.  Patient states that he does have a history of pancreatitis and this feels similar.  He states sometimes he needs to be hospitalized and sometimes he can ride that out at home.  Review of records show that he had a pacemaker placed a couple months ago.  He states that this has been working without issue.  It shows that he was diagnosed with pancreatitis in 2022 and 2021 he based on CT imaging.      Unless otherwise specified:  - No language barrier.   - History obtained from patient.   - There are no limitations to the history obtained.  - Previous charting was reviewed    PMH:   has a past medical history of FLORENCE (acute kidney injury) (HCC), Asthma, Cardiac murmur, COPD (chronic obstructive pulmonary disease) (HCC), Diabetes mellitus (HCC), Hyperlipidemia, and Hypertension.    PSH:   has a past surgical history that includes Appendectomy.       ROS:  Review of Systems   - 13 point ROS was performed and all are normal unless stated in the history above.   - Nursing note reviewed. Vitals reviewed.   - Orders placed by myself and/or advanced practitioner / resident.        PE:   Vitals:    09/03/24 1225 09/03/24 1400   BP: 144/85 151/89   BP Location: Right arm Right arm   Pulse: 83 76   Resp: 18 18   Temp: 98 °F (36.7 °C)    TempSrc: Temporal    SpO2: 96% 95%   Weight: 81.6 kg (180 lb)    Height: 5' 11\" (1.803 m)      Vitals reviewed by me.     Patient " with mild, epigastric abdominal pain.  No rebound guarding.  No actual work of breathing.  Unless otherwise specified above:    General: VS reviewed  Appears in NAD    Head: Normocephalic, atraumatic  Eyes: EOM-I.     ENT: Atraumatic external nose and ears.    No drooling.     Neck: No JVD.    CV: No pallor noted  Lungs:   No tachypnea  No respiratory distress    Abdomen:  Soft, non-tender, non-distended    MSK:   No obvious deformity    Skin: Dry, intact. No obvious rash.    Psychiatric/Behavioral: Appropriate mood and affect   Exam: deferred    Physical Exam     Procedures   A:  - Nursing note reviewed.                      CT abdomen pelvis with contrast   Final Result      Edematous gastric antral wall thickening consistent with gastritis.   No obvious associated lesion/ulceration identified, noting that CT is not sensitive for this evaluation.   GI consult with endoscopy recommended for further evaluation.      Fluid-filled nondistended loops of small bowel in the right hemiabdomen are nonspecific.   A mild enteritis versus diarrheal GI illness could be considered.      The study was marked in EPIC for immediate notification.         Workstation performed: [id number]           Orders Placed This Encounter   Procedures    CT abdomen pelvis with contrast    CBC and differential    Comprehensive metabolic panel    Lipase    Lactic acid, plasma (w/reflex if result > 2.0)    HS Troponin 0hr (reflex protocol)    Lactic acid 2 Hours    Insert peripheral IV    ECG 12 lead    ECG 12 lead     Labs Reviewed   CBC AND DIFFERENTIAL - Abnormal       Result Value Ref Range Status    WBC 10.75 (*) 4.31 - 10.16 Thousand/uL Final    RBC 5.20  3.88 - 5.62 Million/uL Final    Hemoglobin 14.5  12.0 - 17.0 g/dL Final    Hematocrit 45.2  36.5 - 49.3 % Final    MCV 87  82 - 98 fL Final    MCH 27.9  26.8 - 34.3 pg Final    MCHC 32.1  31.4 - 37.4 g/dL Final    RDW 13.8  11.6 - 15.1 % Final    MPV 9.1  8.9 - 12.7 fL Final     Platelets 244  149 - 390 Thousands/uL Final    nRBC 0  /100 WBCs Final    Segmented % 65  43 - 75 % Final    Immature Grans % 0  0 - 2 % Final    Lymphocytes % 25  14 - 44 % Final    Monocytes % 9  4 - 12 % Final    Eosinophils Relative 1  0 - 6 % Final    Basophils Relative 0  0 - 1 % Final    Absolute Neutrophils 6.99  1.85 - 7.62 Thousands/µL Final    Absolute Immature Grans 0.04  0.00 - 0.20 Thousand/uL Final    Absolute Lymphocytes 2.64  0.60 - 4.47 Thousands/µL Final    Absolute Monocytes 0.96  0.17 - 1.22 Thousand/µL Final    Eosinophils Absolute 0.10  0.00 - 0.61 Thousand/µL Final    Basophils Absolute 0.02  0.00 - 0.10 Thousands/µL Final   COMPREHENSIVE METABOLIC PANEL - Abnormal    Sodium 136  135 - 147 mmol/L Final    Potassium 3.3 (*) 3.5 - 5.3 mmol/L Final    Chloride 97  96 - 108 mmol/L Final    CO2 26  21 - 32 mmol/L Final    ANION GAP 13  4 - 13 mmol/L Final    BUN 28 (*) 5 - 25 mg/dL Final    Creatinine 1.12  0.60 - 1.30 mg/dL Final    Comment: Standardized to IDMS reference method    Glucose 259 (*) 65 - 140 mg/dL Final    Comment: If the patient is fasting, the ADA then defines impaired fasting glucose as > 100 mg/dL and diabetes as > or equal to 123 mg/dL.    Calcium 9.0  8.4 - 10.2 mg/dL Final    AST 18  13 - 39 U/L Final    ALT 34  7 - 52 U/L Final    Comment: Specimen collection should occur prior to Sulfasalazine administration due to the potential for falsely depressed results.     Alkaline Phosphatase 106 (*) 34 - 104 U/L Final    Total Protein 8.0  6.4 - 8.4 g/dL Final    Albumin 4.3  3.5 - 5.0 g/dL Final    Total Bilirubin 0.64  0.20 - 1.00 mg/dL Final    Comment: Use of this assay is not recommended for patients undergoing treatment with eltrombopag due to the potential for falsely elevated results.  N-acetyl-p-benzoquinone imine (metabolite of Acetaminophen) will generate erroneously low results in samples for patients that have taken an overdose of Acetaminophen.    eGFR 72   "ml/min/1.73sq m Final    Narrative:     National Kidney Disease Foundation guidelines for Chronic Kidney Disease (CKD):     Stage 1 with normal or high GFR (GFR > 90 mL/min/1.73 square meters)    Stage 2 Mild CKD (GFR = 60-89 mL/min/1.73 square meters)    Stage 3A Moderate CKD (GFR = 45-59 mL/min/1.73 square meters)    Stage 3B Moderate CKD (GFR = 30-44 mL/min/1.73 square meters)    Stage 4 Severe CKD (GFR = 15-29 mL/min/1.73 square meters)    Stage 5 End Stage CKD (GFR <15 mL/min/1.73 square meters)  Note: GFR calculation is accurate only with a steady state creatinine   LACTIC ACID, PLASMA (W/REFLEX IF RESULT > 2.0) - Abnormal    LACTIC ACID 2.7 (*) 0.5 - 2.0 mmol/L Final    Narrative:     Result may be elevated if tourniquet was used during collection.   LIPASE - Normal    Lipase 27  11 - 82 u/L Final   HS TROPONIN I 0HR - Normal    hs TnI 0hr 7  \"Refer to ACS Flowchart\"- see link ng/L Final    Comment:                                              Initial (time 0) result  If >=50 ng/L, Myocardial injury suggested ;  Type of myocardial injury and treatment strategy  to be determined.  If 5-49 ng/L, a delta result at 2 hours and or 4 hours will be needed to further evaluate.  If <4 ng/L, and chest pain has been >3 hours since onset, patient may qualify for discharge based on the HEART score in the ED.  If <5 ng/L and <3hours since onset of chest pain, a delta result at 2 hours will be needed to further evaluate.    HS Troponin 99th Percentile URL of a Health Population=12 ng/L with a 95% Confidence Interval of 8-18 ng/L.    Second Troponin (time 2 hours)  If calculated delta >= 20 ng/L,  Myocardial injury suggested ; Type of myocardial injury and treatment strategy to be determined.  If 5-49 ng/L and the calculated delta is 5-19 ng/L, consult medical service for evaluation.  Continue evaluation for ischemia on ecg and other possible etiology and repeat hs troponin at 4 hours.  If delta is <5 ng/L at 2 hours, " consider discharge based on risk stratification via the HEART score (if in ED), or SLY risk score in IP/Observation.    HS Troponin 99th Percentile URL of a Health Population=12 ng/L with a 95% Confidence Interval of 8-18 ng/L.   LACTIC ACID 2 HOUR         Final Diagnosis:  1. Gastritis        P:  -Will evaluate for ACS, arrhythmia, pancreatitis, biliary disease.  CT imaging.  - Findings on CT imaging.  Discussed this with the patient.  Discussed possible ulcer causing his current discomfort.  He states he did have an EGD done about 2 years ago.  I discussed with him symptom management.  Will provide Carafate as well as Pepcid.  Reviewed follow-up with GI team.  Reviewed return precautions such as worsening of pain, vomiting.      Unless otherwise noted the patient's medications were reviewed and they should continue as directed.    Unless otherwise specified:  CC is exclusive from any separately billable procedures  CC is exclusive of treating other patients  CC is exclusive of teaching time   All splints are static    Medications   ondansetron (FOR EMS ONLY) (ZOFRAN-ODT) 4 mg dispersible tablet 4 mg (0 mg Does not apply Given to EMS 9/3/24 1229)   sodium chloride 0.9 % bolus 1,000 mL (0 mL Intravenous Stopped 9/3/24 1337)   HYDROmorphone (DILAUDID) injection 0.5 mg (0.5 mg Intravenous Given 9/3/24 1248)   iohexol (OMNIPAQUE) 350 MG/ML injection (MULTI-DOSE) 100 mL (100 mL Intravenous Given 9/3/24 1308)     Time reflects when diagnosis was documented in both MDM as applicable and the Disposition within this note       Time User Action Codes Description Comment    9/3/2024  2:19 PM Lester Santos Add [K29.70] Gastritis           ED Disposition       ED Disposition   Discharge    Condition   Stable    Date/Time   Tue Sep 3, 2024  2:18 PM    Comment   Nahid Robertson discharge to home/self care.                   Follow-up Information       Follow up With Specialties Details Why Contact Info    [facility] GI Team   "Schedule an appointment as soon as possible for a visit             Patient's Medications   Discharge Prescriptions    FAMOTIDINE (PEPCID) 20 MG TABLET    Take 1 tablet (20 mg total) by mouth 2 (two) times a day       Start Date: 9/3/2024  End Date: --       Order Dose: 20 mg       Quantity: 14 tablet    Refills: 0    ONDANSETRON (ZOFRAN) 4 MG TABLET    Take 1 tablet (4 mg total) by mouth every 8 (eight) hours as needed for vomiting or nausea       Start Date: 9/3/2024  End Date: --       Order Dose: 4 mg       Quantity: 12 tablet    Refills: 0    SUCRALFATE (CARAFATE) 1 G TABLET    Take 1 tablet (1 g total) by mouth 4 (four) times a day for 7 days       Start Date: 9/3/2024  End Date: 9/10/2024       Order Dose: 1 g       Quantity: 28 tablet    Refills: 0     No discharge procedures on file.  Prior to Admission Medications   Prescriptions Last Dose Informant Patient Reported? Taking?   Evolocumab 140 MG/ML SOAJ   Yes No   Sig: Inject 140 mg under the skin every 14 (fourteen) days   Insulin Pen Needle (PEN NEEDLES 29GX1/2\") 29G X 12MM MISC  Self Yes No   Si Device by Does not apply route   Insulin Syringe-Needle U-100 31G X 1/4\" 0.5 ML MISC  Self Yes No   Si Syringe by Does not apply route   Lancet Devices (LANCING DEVICE) MISC  Self Yes No   Sig: Inject 1 Device under the skin   Riboflavin (Vitamin B-2) 50 MG TABS   Yes No   Sig: Take 1 tablet by mouth daily   albuterol (PROVENTIL HFA,VENTOLIN HFA) 90 mcg/act inhaler  Self No No   Sig: Inhale 2 puffs every 4 (four) hours as needed for wheezing   atorvastatin (LIPITOR) 40 mg tablet  Self Yes No   Sig: TAKE 1 TABLET BY MOUTH EVERY DAY AT NIGHT   fenofibrate (TRIGLIDE) 160 MG tablet   Yes No   Sig: Take 160 mg by mouth daily Not taking   insulin glulisine (APIDRA) 100 units/mL injection   Yes No   Sig: TO BE USED VIA INSULIN PUMP-- UP  UNITS/DAY,   lisinopril (ZESTRIL) 20 mg tablet  Self Yes No   Sig: Take 20 mg by mouth daily   semaglutide, 0.25 or 0.5 " "mg/dose, (Ozempic, 0.25 or 0.5 MG/DOSE,) 2 mg/3 mL injection pen   Yes No   Sig: Inject 0.5 mg under the skin Once a week   sildenafil (VIAGRA) 100 mg tablet   Yes No   Sig: Take 100 mg by mouth      Facility-Administered Medications: None       Portions of the record may have been created with voice recognition software. Occasional wrong word or \"sound a like\" substitutions may have occurred due to the inherent limitations of voice recognition software. Read the chart carefully and recognize, using context, where substitutions have occurred.    Electronically signed by:  MD Lester Bourne MD  09/03/24 1424    "

## 2024-09-04 LAB
ATRIAL RATE: 81 BPM
P AXIS: 71 DEGREES
PR INTERVAL: 166 MS
QRS AXIS: -45 DEGREES
QRSD INTERVAL: 100 MS
QT INTERVAL: 380 MS
QTC INTERVAL: 441 MS
T WAVE AXIS: 84 DEGREES
VENTRICULAR RATE: 81 BPM

## 2024-09-04 PROCEDURE — 93010 ELECTROCARDIOGRAM REPORT: CPT | Performed by: INTERNAL MEDICINE

## 2024-09-09 ENCOUNTER — APPOINTMENT (EMERGENCY)
Dept: RADIOLOGY | Facility: HOSPITAL | Age: 59
End: 2024-09-09
Payer: COMMERCIAL

## 2024-09-09 ENCOUNTER — APPOINTMENT (EMERGENCY)
Dept: ULTRASOUND IMAGING | Facility: HOSPITAL | Age: 59
End: 2024-09-09
Payer: COMMERCIAL

## 2024-09-09 ENCOUNTER — HOSPITAL ENCOUNTER (EMERGENCY)
Facility: HOSPITAL | Age: 59
Discharge: HOME | End: 2024-09-09
Attending: EMERGENCY MEDICINE
Payer: COMMERCIAL

## 2024-09-09 VITALS
DIASTOLIC BLOOD PRESSURE: 71 MMHG | HEART RATE: 67 BPM | SYSTOLIC BLOOD PRESSURE: 134 MMHG | RESPIRATION RATE: 13 BRPM | TEMPERATURE: 97.6 F | OXYGEN SATURATION: 99 %

## 2024-09-09 DIAGNOSIS — R10.13 EPIGASTRIC PAIN: Primary | ICD-10-CM

## 2024-09-09 LAB
ALBUMIN SERPL BCG-MCNC: 4.2 G/DL (ref 3.5–5)
ALP SERPL-CCNC: 90 U/L (ref 34–104)
ALT SERPL W P-5'-P-CCNC: 25 U/L (ref 7–52)
ANION GAP SERPL CALCULATED.3IONS-SCNC: 10 MMOL/L (ref 4–13)
AST SERPL W P-5'-P-CCNC: 16 U/L (ref 13–39)
BASOPHILS # BLD AUTO: 0.02 THOUSANDS/ÂΜL (ref 0–0.1)
BASOPHILS NFR BLD AUTO: 0 % (ref 0–1)
BILIRUB SERPL-MCNC: 0.72 MG/DL (ref 0.2–1)
BILIRUB UR QL STRIP: NEGATIVE
BUN SERPL-MCNC: 14 MG/DL (ref 5–25)
CALCIUM SERPL-MCNC: 9.6 MG/DL (ref 8.4–10.2)
CARDIAC TROPONIN I PNL SERPL HS: 7 NG/L (ref 8–18)
CHLORIDE SERPL-SCNC: 97 MMOL/L (ref 96–108)
CLARITY UR: CLEAR
CO2 SERPL-SCNC: 27 MMOL/L (ref 21–32)
COLOR UR: YELLOW
CREAT SERPL-MCNC: 0.98 MG/DL (ref 0.6–1.3)
EOSINOPHIL # BLD AUTO: 0.15 THOUSAND/ÂΜL (ref 0–0.61)
EOSINOPHIL NFR BLD AUTO: 1 % (ref 0–6)
ERYTHROCYTE [DISTWIDTH] IN BLOOD BY AUTOMATED COUNT: 13.6 % (ref 11.6–15.1)
GFR SERPL CREATININE-BSD FRML MDRD: 84 ML/MIN/1.73SQ M
GLUCOSE SERPL-MCNC: 145 MG/DL (ref 65–140)
GLUCOSE UR STRIP-MCNC: ABNORMAL MG/DL
HCT VFR BLD AUTO: 46.3 % (ref 36.5–49.3)
HGB BLD-MCNC: 14.7 G/DL (ref 12–17)
HGB UR QL STRIP.AUTO: NEGATIVE
IMM GRANULOCYTES # BLD AUTO: 0.02 THOUSAND/UL (ref 0–0.2)
IMM GRANULOCYTES NFR BLD AUTO: 0 % (ref 0–2)
KETONES UR STRIP-MCNC: ABNORMAL MG/DL
LACTATE SERPL-SCNC: 1.2 MMOL/L (ref 0.5–2)
LEUKOCYTE ESTERASE UR QL STRIP: NEGATIVE
LIPASE SERPL-CCNC: 92 U/L (ref 11–82)
LYMPHOCYTES # BLD AUTO: 3.53 THOUSANDS/ÂΜL (ref 0.6–4.47)
LYMPHOCYTES NFR BLD AUTO: 34 % (ref 14–44)
MAGNESIUM SERPL-MCNC: 2 MG/DL (ref 1.9–2.7)
MCH RBC QN AUTO: 27.8 PG (ref 26.8–34.3)
MCHC RBC AUTO-ENTMCNC: 31.7 G/DL (ref 31.4–37.4)
MCV RBC AUTO: 88 FL (ref 82–98)
MONOCYTES # BLD AUTO: 0.68 THOUSAND/ÂΜL (ref 0.17–1.22)
MONOCYTES NFR BLD AUTO: 7 % (ref 4–12)
NEUTROPHILS # BLD AUTO: 5.99 THOUSANDS/ÂΜL (ref 1.85–7.62)
NEUTS SEG NFR BLD AUTO: 58 % (ref 43–75)
NITRITE UR QL STRIP: NEGATIVE
NRBC BLD AUTO-RTO: 0 /100 WBCS
PH UR STRIP.AUTO: 6.5 [PH]
PLATELET # BLD AUTO: 306 THOUSANDS/UL (ref 149–390)
PMV BLD AUTO: 9.2 FL (ref 8.9–12.7)
POTASSIUM SERPL-SCNC: 3.8 MMOL/L (ref 3.5–5.3)
PROT SERPL-MCNC: 8 G/DL (ref 6.4–8.4)
PROT UR STRIP-MCNC: NEGATIVE MG/DL
RBC # BLD AUTO: 5.28 MILLION/UL (ref 3.88–5.62)
SODIUM SERPL-SCNC: 134 MMOL/L (ref 135–147)
SP GR UR STRIP.AUTO: 1.02 (ref 1–1.03)
UROBILINOGEN UR STRIP-ACNC: <2 MG/DL
WBC # BLD AUTO: 10.39 THOUSAND/UL (ref 4.31–10.16)

## 2024-09-09 PROCEDURE — 96375 TX/PRO/DX INJ NEW DRUG ADDON: CPT

## 2024-09-09 PROCEDURE — 85025 COMPLETE CBC W/AUTO DIFF WBC: CPT | Performed by: EMERGENCY MEDICINE

## 2024-09-09 PROCEDURE — 99285 EMERGENCY DEPT VISIT HI MDM: CPT | Performed by: EMERGENCY MEDICINE

## 2024-09-09 PROCEDURE — 99285 EMERGENCY DEPT VISIT HI MDM: CPT

## 2024-09-09 PROCEDURE — 96365 THER/PROPH/DIAG IV INF INIT: CPT

## 2024-09-09 PROCEDURE — 96366 THER/PROPH/DIAG IV INF ADDON: CPT

## 2024-09-09 PROCEDURE — 74022 RADEX COMPL AQT ABD SERIES: CPT

## 2024-09-09 PROCEDURE — 93005 ELECTROCARDIOGRAM TRACING: CPT

## 2024-09-09 PROCEDURE — 36415 COLL VENOUS BLD VENIPUNCTURE: CPT | Performed by: EMERGENCY MEDICINE

## 2024-09-09 PROCEDURE — 83690 ASSAY OF LIPASE: CPT | Performed by: EMERGENCY MEDICINE

## 2024-09-09 PROCEDURE — 80053 COMPREHEN METABOLIC PANEL: CPT | Performed by: EMERGENCY MEDICINE

## 2024-09-09 PROCEDURE — 84484 ASSAY OF TROPONIN QUANT: CPT | Performed by: EMERGENCY MEDICINE

## 2024-09-09 PROCEDURE — 83735 ASSAY OF MAGNESIUM: CPT | Performed by: EMERGENCY MEDICINE

## 2024-09-09 PROCEDURE — 76705 ECHO EXAM OF ABDOMEN: CPT

## 2024-09-09 PROCEDURE — 83605 ASSAY OF LACTIC ACID: CPT | Performed by: EMERGENCY MEDICINE

## 2024-09-09 RX ORDER — PANTOPRAZOLE SODIUM 40 MG/10ML
40 INJECTION, POWDER, LYOPHILIZED, FOR SOLUTION INTRAVENOUS ONCE
Status: COMPLETED | OUTPATIENT
Start: 2024-09-09 | End: 2024-09-09

## 2024-09-09 RX ORDER — PANTOPRAZOLE SODIUM 40 MG/1
40 TABLET, DELAYED RELEASE ORAL DAILY
Qty: 30 TABLET | Refills: 0 | Status: SHIPPED | OUTPATIENT
Start: 2024-09-09 | End: 2024-10-09

## 2024-09-09 RX ORDER — SODIUM CHLORIDE, SODIUM GLUCONATE, SODIUM ACETATE, POTASSIUM CHLORIDE, MAGNESIUM CHLORIDE, SODIUM PHOSPHATE, DIBASIC, AND POTASSIUM PHOSPHATE .53; .5; .37; .037; .03; .012; .00082 G/100ML; G/100ML; G/100ML; G/100ML; G/100ML; G/100ML; G/100ML
1000 INJECTION, SOLUTION INTRAVENOUS ONCE
Status: COMPLETED | OUTPATIENT
Start: 2024-09-09 | End: 2024-09-09

## 2024-09-09 RX ADMIN — PANTOPRAZOLE SODIUM 40 MG: 40 INJECTION, POWDER, FOR SOLUTION INTRAVENOUS at 15:00

## 2024-09-09 RX ADMIN — SODIUM CHLORIDE, SODIUM GLUCONATE, SODIUM ACETATE, POTASSIUM CHLORIDE, MAGNESIUM CHLORIDE, SODIUM PHOSPHATE, DIBASIC, AND POTASSIUM PHOSPHATE 1000 ML: .53; .5; .37; .037; .03; .012; .00082 INJECTION, SOLUTION INTRAVENOUS at 14:26

## 2024-09-09 NOTE — DISCHARGE INSTRUCTIONS
Finish the famotidine as prescribed (additional 3-4 days)  Add pantopraxole  protonix 40mg daily  Plenty of fliuds 2 quarts dailyu   Mirlax (generic) over the counter 17g (1 capful) in 8oz of liquid daily  to help with constipation avoid laxiatives  Return with fever chest pain shortness of breath worsening or changing abdominal pain inability keep fluids down not peeing every 6-8 hours black stools red stools lightheadedness or any new or worsening symptoms.

## 2024-09-09 NOTE — ED PROVIDER NOTES
"History  Chief Complaint   Patient presents with    Abdominal Pain     Patient complains of generalized abdominal pain. Was seen here the other day for the same and states the pain isn't any better. Patient also states he hasn't had a bowel movement in about a week. Pain 8/10 on arrival.      59-year-old male presents with ongoing epigastric pain which he describes as burning.  Was evaluated on 9/3/2024 underwent labs EKG and CT abdomen pelvis evaluation for the same pain.  He was diagnosed with gastritis started on famotidine and Carafate and given Zofran on a as needed basis.  Patient presents due to the fact that he has not improved.  States he has been compliant with the Carafate and famotidine but is not helping the pain reminds him of potential pancreatitis that he is experienced in the past.  Denies any fever chills he said no nausea or vomiting when he attempts to eat something he states he has been hungry it makes the pain worse he has been bloated he states he has not passed any flatus he has been burping but denies heartburn pain is located in the epigastric region he has no chest pain shortness of breath or pleuritic pain he said no cough or upper respiratory complaints.  No history of trauma or fall he states his last bowel movement is greater than a week ago he denies any lightheadedness.  He tried taking some laxatives yesterday it did not help.  Patient self-reports an EGD 2 to 3 years ago he was not told that there were any abnormalities;   Medical history FLORENCE asthma murmur COPD pancreatitis diabetes hypertension hyperlipidemia  Past surgical history appendectomy and pacemaker placement for symptomatic bradycardia        Prior to Admission Medications   Prescriptions Last Dose Informant Patient Reported? Taking?   Evolocumab 140 MG/ML SOAJ   Yes No   Sig: Inject 140 mg under the skin every 14 (fourteen) days   Insulin Pen Needle (PEN NEEDLES 29GX1/2\") 29G X 12MM MISC  Self Yes No   Si Device by " "Does not apply route   Insulin Syringe-Needle U-100 31G X 1/\" 0.5 ML MISC  Self Yes No   Si Syringe by Does not apply route   Lancet Devices (LANCING DEVICE) MISC  Self Yes No   Sig: Inject 1 Device under the skin   Riboflavin (Vitamin B-2) 50 MG TABS   Yes No   Sig: Take 1 tablet by mouth daily   albuterol (PROVENTIL HFA,VENTOLIN HFA) 90 mcg/act inhaler  Self No No   Sig: Inhale 2 puffs every 4 (four) hours as needed for wheezing   atorvastatin (LIPITOR) 40 mg tablet  Self Yes No   Sig: TAKE 1 TABLET BY MOUTH EVERY DAY AT NIGHT   famotidine (PEPCID) 20 mg tablet   No No   Sig: Take 1 tablet (20 mg total) by mouth 2 (two) times a day   fenofibrate (TRIGLIDE) 160 MG tablet   Yes No   Sig: Take 160 mg by mouth daily Not taking   insulin glulisine (APIDRA) 100 units/mL injection   Yes No   Sig: TO BE USED VIA INSULIN PUMP-- UP  UNITS/DAY,   lisinopril (ZESTRIL) 20 mg tablet  Self Yes No   Sig: Take 20 mg by mouth daily   ondansetron (ZOFRAN) 4 mg tablet   No No   Sig: Take 1 tablet (4 mg total) by mouth every 8 (eight) hours as needed for vomiting or nausea   semaglutide, 0.25 or 0.5 mg/dose, (Ozempic, 0.25 or 0.5 MG/DOSE,) 2 mg/3 mL injection pen   Yes No   Sig: Inject 0.5 mg under the skin Once a week   sildenafil (VIAGRA) 100 mg tablet   Yes No   Sig: Take 100 mg by mouth   sucralfate (CARAFATE) 1 g tablet   No No   Sig: Take 1 tablet (1 g total) by mouth 4 (four) times a day for 7 days      Facility-Administered Medications: None       Past Medical History:   Diagnosis Date    FLORENCE (acute kidney injury) (HCC)     Asthma     Cardiac murmur     COPD (chronic obstructive pulmonary disease) (HCC)     Diabetes mellitus (HCC)     Hyperlipidemia     Hypertension        Past Surgical History:   Procedure Laterality Date    APPENDECTOMY      teenager        Family History   Problem Relation Age of Onset    Hypertension Mother     Hypertension Father      I have reviewed and agree with the history as " documented.    E-Cigarette/Vaping    E-Cigarette Use Never User      E-Cigarette/Vaping Substances    Nicotine No     THC No     CBD No     Flavoring No     Other No     Unknown No      Social History     Tobacco Use    Smoking status: Some Days     Current packs/day: 0.25     Average packs/day: 0.3 packs/day for 25.0 years (6.3 ttl pk-yrs)     Types: Cigarettes    Smokeless tobacco: Never   Vaping Use    Vaping status: Never Used   Substance Use Topics    Alcohol use: Yes     Comment: occassional    Drug use: No       Review of Systems   Constitutional:  Positive for activity change. Negative for appetite change, chills and fever.   HENT:  Negative for congestion, ear pain, rhinorrhea, sneezing and sore throat.    Eyes:  Negative for discharge.   Respiratory:  Negative for cough and shortness of breath.    Cardiovascular:  Negative for chest pain and leg swelling.   Gastrointestinal:  Positive for abdominal distention, abdominal pain and constipation. Negative for blood in stool, diarrhea, nausea and vomiting.   Endocrine: Negative for polyuria.   Genitourinary:  Negative for difficulty urinating, dysuria, flank pain, frequency and urgency.   Musculoskeletal:  Positive for back pain (radiates to the back). Negative for myalgias, neck pain and neck stiffness.   Skin:  Negative for rash.   Neurological:  Negative for dizziness, weakness, light-headedness, numbness and headaches.   Hematological:  Negative for adenopathy.   Psychiatric/Behavioral:  Negative for confusion.    All other systems reviewed and are negative.      Physical Exam  Physical Exam  Vitals and nursing note reviewed.   Constitutional:       General: He is not in acute distress.     Appearance: He is well-developed. He is not ill-appearing, toxic-appearing or diaphoretic.   HENT:      Head: Normocephalic and atraumatic.      Right Ear: Tympanic membrane and external ear normal.      Left Ear: Tympanic membrane and external ear normal.      Nose:  Nose normal.      Mouth/Throat:      Mouth: Oropharynx is clear and moist. Mucous membranes are moist.      Pharynx: No oropharyngeal exudate or posterior oropharyngeal erythema.   Eyes:      General: No scleral icterus.        Right eye: No discharge.         Left eye: No discharge.      Extraocular Movements: Extraocular movements intact and EOM normal.      Conjunctiva/sclera: Conjunctivae normal.      Pupils: Pupils are equal, round, and reactive to light.   Cardiovascular:      Rate and Rhythm: Normal rate and regular rhythm.      Heart sounds: Normal heart sounds.   Pulmonary:      Effort: Pulmonary effort is normal. No respiratory distress.      Breath sounds: Normal breath sounds.   Abdominal:      General: Bowel sounds are normal. There is no distension.      Palpations: Abdomen is soft. There is no mass.      Tenderness: There is abdominal tenderness (epigastric; mild to RUQ and LUQ no chandler's sign). There is no right CVA tenderness, left CVA tenderness, guarding or rebound.      Comments: Back: no mildline or CVA tenderness   Musculoskeletal:         General: No tenderness, deformity or edema. Normal range of motion.      Cervical back: Normal range of motion and neck supple.      Right lower leg: No edema.      Left lower leg: No edema.   Lymphadenopathy:      Cervical: No cervical adenopathy.   Skin:     General: Skin is warm and dry.      Capillary Refill: Capillary refill takes less than 2 seconds.      Findings: No rash.   Neurological:      General: No focal deficit present.      Mental Status: He is alert and oriented to person, place, and time.      Cranial Nerves: No cranial nerve deficit.      Sensory: No sensory deficit.      Motor: No weakness or abnormal muscle tone.      Coordination: Coordination normal.      Gait: Gait normal.   Psychiatric:         Mood and Affect: Mood and affect and mood normal.         Vital Signs  ED Triage Vitals [09/09/24 1405]   Temperature Pulse Respirations  Blood Pressure SpO2   97.6 °F (36.4 °C) 67 18 134/71 99 %      Temp Source Heart Rate Source Patient Position - Orthostatic VS BP Location FiO2 (%)   Temporal Monitor -- -- --      Pain Score       8           Vitals:    09/09/24 1405 09/09/24 1530 09/09/24 1715 09/09/24 1730   BP: 134/71      Pulse: 67 63 64 67         Visual Acuity      ED Medications  Medications   multi-electrolyte (ISOLYTE-S PH 7.4) bolus 1,000 mL (0 mL Intravenous Stopped 9/9/24 1656)   pantoprazole (PROTONIX) injection 40 mg (40 mg Intravenous Given 9/9/24 1500)       Diagnostic Studies  Results Reviewed       Procedure Component Value Units Date/Time    UA w Reflex to Microscopic w Reflex to Culture [[id number]]  (Abnormal) Collected: 09/09/24 1702    Lab Status: Final result Specimen: Urine, Clean Catch Updated: 09/09/24 1713     Color, UA Yellow     Clarity, UA Clear     Specific Gravity, UA 1.020     pH, UA 6.5     Leukocytes, UA Negative     Nitrite, UA Negative     Protein, UA Negative mg/dl      Glucose,  (3/10%) mg/dl      Ketones, UA 10 (1+) mg/dl      Urobilinogen, UA <2.0 mg/dl      Bilirubin, UA Negative     Occult Blood, UA Negative    High Sensitivity Troponin I Random [[id number]]  (Abnormal) Collected: 09/09/24 1426    Lab Status: Final result Specimen: Blood from Arm, Left Updated: 09/09/24 1502     HS TnI random 7 ng/L     Comprehensive metabolic panel [[id number]]  (Abnormal) Collected: 09/09/24 1426    Lab Status: Final result Specimen: Blood from Arm, Left Updated: 09/09/24 1458     Sodium 134 mmol/L      Potassium 3.8 mmol/L      Chloride 97 mmol/L      CO2 27 mmol/L      ANION GAP 10 mmol/L      BUN 14 mg/dL      Creatinine 0.98 mg/dL      Glucose 145 mg/dL      Calcium 9.6 mg/dL      AST 16 U/L      ALT 25 U/L      Alkaline Phosphatase 90 U/L      Total Protein 8.0 g/dL      Albumin 4.2 g/dL      Total Bilirubin 0.72 mg/dL      eGFR 84 ml/min/1.73sq m     Narrative:      National Kid[employer] Foundation guidelines  for Chronic Kidney Disease (CKD):     Stage 1 with normal or high GFR (GFR > 90 mL/min/1.73 square meters)    Stage 2 Mild CKD (GFR = 60-89 mL/min/1.73 square meters)    Stage 3A Moderate CKD (GFR = 45-59 mL/min/1.73 square meters)    Stage 3B Moderate CKD (GFR = 30-44 mL/min/1.73 square meters)    Stage 4 Severe CKD (GFR = 15-29 mL/min/1.73 square meters)    Stage 5 End Stage CKD (GFR <15 mL/min/1.73 square meters)  Note: GFR calculation is accurate only with a steady state creatinine    Lipase [[id number]]  (Abnormal) Collected: 09/09/24 1426    Lab Status: Final result Specimen: Blood from Arm, Left Updated: 09/09/24 1458     Lipase 92 u/L     Magnesium [[id number]]  (Normal) Collected: 09/09/24 1426    Lab Status: Final result Specimen: Blood from Arm, Left Updated: 09/09/24 1458     Magnesium 2.0 mg/dL     Lactic acid, plasma (w/reflex if result > 2.0) [[id number]]  (Normal) Collected: 09/09/24 1426    Lab Status: Final result Specimen: Blood from Arm, Left Updated: 09/09/24 1454     LACTIC ACID 1.2 mmol/L     Narrative:      Result may be elevated if tourniquet was used during collection.    CBC and differential [[id number]]  (Abnormal) Collected: 09/09/24 1426    Lab Status: Final result Specimen: Blood from Arm, Left Updated: 09/09/24 1438     WBC 10.39 Thousand/uL      RBC 5.28 Million/uL      Hemoglobin 14.7 g/dL      Hematocrit 46.3 %      MCV 88 fL      MCH 27.8 pg      MCHC 31.7 g/dL      RDW 13.6 %      MPV 9.2 fL      Platelets 306 Thousands/uL      nRBC 0 /100 WBCs      Segmented % 58 %      Immature Grans % 0 %      Lymphocytes % 34 %      Monocytes % 7 %      Eosinophils Relative 1 %      Basophils Relative 0 %      Absolute Neutrophils 5.99 Thousands/µL      Absolute Immature Grans 0.02 Thousand/uL      Absolute Lymphocytes 3.53 Thousands/µL      Absolute Monocytes 0.68 Thousand/µL      Eosinophils Absolute 0.15 Thousand/µL      Basophils Absolute 0.02 Thousands/µL                    [facility]  quadrant   Final Result by Thad Sosa MD (09/09 1624)      No gallstones. The common duct is normal in caliber.            Workstation performed: [id number]         XR abdomen obstruction series   ED Interpretation by Caitlyn Hopkins MD (09/09 1621)   Per my independent interpretation. Radiologist to provide formal read. CXR no acute process no free air nonspecific BG pattern; stool in left hemicolon and rectum      Final Result by Cameron Torres MD (09/09 2050)      Nonobstructive bowel gas pattern.         Workstation performed: [id number]                    Procedures  ECG 12 Lead Documentation Only    Date/Time: 9/9/2024 4:21 PM    Performed by: Caitlyn Hopkins MD  Authorized by: Caitlyn Hopkins MD    Indications / Diagnosis:  Epigastric pain  ECG reviewed by me, the ED Provider: yes    Patient location:  ED  Previous ECG:     Previous ECG:  Compared to current    Comparison ECG info:  9/3/2024 1241    Similarity:  Changes noted  Rate:     ECG rate:  67    ECG rate assessment: normal    Rhythm:     Rhythm: sinus rhythm    QRS:     QRS axis:  Left  Comments:      : Specific T wave changes waves appear to be more upright in leads V5 and V6 versus prior           ED Course  ED Course as of 09/09/24 2251   Mon Sep 09, 2024   1749 Feels improved after Protonix and IV fluid hydration.  Extensively reviewed labs including stable hemoglobin normal white cell count with no left shift no evidence of transaminitis lipase slightly elevated at 92 but is not 3 times low normal not consistent with acute pancreatitis.  Evidence of mild dehydration in the urine.  Discussed his prior EGD and colonoscopy findings from 2019 commend follow-up with his family doctor as well as gastroenterologist as he is likely due for repeat colonoscopy and may require repeat EGD.  Opponent is negative will switch patient to a proton pump inhibitor amended plenty of fluids.  To avoid alcohol bland  diet reviewed ultrasound findings of no cholecystitis and a normal and common normal common bile duct.  Also reviewed acute abdominal series there is evidence of perhaps mild constipation recommended MiraLAX over-the-counter patient was comfortable with plan.  Sensibly reviewed reasons for return               HEART Risk Score      Flowsheet Row Most Recent Value   Heart Score Risk Calculator    History 1 Filed at: 09/09/2024 1629   ECG 1 Filed at: 09/09/2024 1629   Age 1 Filed at: 09/09/2024 1629   Risk Factors 2 Filed at: 09/09/2024 1629   Troponin 0 Filed at: 09/09/2024 1629   HEART Score 5 Filed at: 09/09/2024 1629                          SBIRT 20yo+      Flowsheet Row Most Recent Value   Initial Alcohol Screen: US AUDIT-C     1. How often do you have a drink containing alcohol? 0 Filed at: 09/09/2024 1404   2. How many drinks containing alcohol do you have on a typical day you are drinking?  0 Filed at: 09/09/2024 1404   3a. Male UNDER 65: How often do you have five or more drinks on one occasion? 0 Filed at: 09/09/2024 1404   3b. FEMALE Any Age, or MALE 65+: How often do you have 4 or more drinks on one occassion? 0 Filed at: 09/09/2024 1404   Audit-C Score 0 Filed at: 09/09/2024 1404   MARCUS: How many times in the past year have you...    Used an illegal drug or used a prescription medication for non-medical reasons? Never Filed at: 09/09/2024 1404                      Medical Decision Making  Mdm: 59-year-old male presents with ongoing epigastric pain which occasionally radiates to his back.  He was evaluated on 9/3/2024 in the emergency department this note was reviewed CT abdomen pelvis was remarkable for evidence of gastritis as well as nonspecific fluid-filled but not dilated loops of bowel rtpatient does have evidence of vascular disease.  Has had ongoing symptoms of pain has been constant he complains of bloating not passing gas and not having a bowel movement for the last week the pain is burning and  reminds him of his prior episodes of pancreatitis his lipase last week was normal.  Has had no fever or chills he has been taking his famotidine and Carafate as prescribed.  CT findings were extensively reviewed.  Differential diagnosis includes ongoing gastritis, pancreatitis, cholecystitis patient had no evidence of constipation and his prior CT scan physical exam is not suggestive of obstruction did not have a surgical abdomen is not consistent with colitis as he does not have pain out of proportion will evaluate with EKG to reassess for possibility of coronary syndrome, check electrolytes LFTs evaluate for pancreatitis.  Will hold off on imaging until lab evaluation is back.  Initiate symptomatic management with IV fluid hydration and PPI IV.    Reviewed EGD, colonscopy 4/12/19 EGD was normal polyp polyps were removed from the colon recommending repeat colonoscopy in 5 years    Amount and/or Complexity of Data Reviewed  Labs: ordered.  Radiology: ordered and independent interpretation performed.    Risk  Prescription drug management.                 Disposition  Final diagnoses:   Epigastric pain - gastritis     Time reflects when diagnosis was documented in both MDM as applicable and the Disposition within this note       Time User Action Codes Description Comment    9/9/2024  5:53 PM Caitlyn Hopkins Add [R10.13] Epigastric pain     9/9/2024  5:53 PM Caitlyn Hopkins Modify [R10.13] Epigastric pain gastritis          ED Disposition       ED Disposition   Discharge    Condition   Stable    Date/Time   Mon Sep 9, 2024 9111    Comment   Nahid Robertson discharge to home/self care.                   Follow-up Information       Follow up With Specialties Details Why Contact Info    [facility]  [address] of [address]  [address]  [place] [id number] [phone]      Pravin Kruse DO Gastroenterology Call in 1 day discuss need for repeat endoscopy followup on 5 year  "colonscopy [address]  [address]  [facility] [address]  [phone]              Discharge Medication List as of 9/9/2024  5:59 PM        START taking these medications    Details   pantoprazole (PROTONIX) 40 mg tablet Take 1 tablet (40 mg total) by mouth daily for 30 doses, Starting Mon 9/9/2024, Until Wed 10/9/2024, Normal           CONTINUE these medications which have NOT CHANGED    Details   albuterol (PROVENTIL HFA,VENTOLIN HFA) 90 mcg/act inhaler Inhale 2 puffs every 4 (four) hours as needed for wheezing, Starting Mon 9/17/2018, Print      atorvastatin (LIPITOR) 40 mg tablet TAKE 1 TABLET BY MOUTH EVERY DAY AT NIGHT, Historical Med      Evolocumab 140 MG/ML SOAJ Inject 140 mg under the skin every 14 (fourteen) days, Starting Wed 9/27/2023, Historical Med      famotidine (PEPCID) 20 mg tablet Take 1 tablet (20 mg total) by mouth 2 (two) times a day, Starting Tue 9/3/2024, Print      fenofibrate (TRIGLIDE) 160 MG tablet Take 160 mg by mouth daily Not taking, Starting Mon 3/22/2021, Historical Med      insulin glulisine (APIDRA) 100 units/mL injection TO BE USED VIA INSULIN PUMP-- UP  UNITS/DAY,, Historical Med      Insulin Pen Needle (PEN NEEDLES 29GX1/2\") 29G X 12MM MISC 1 Device by Does not apply route, Starting Thu 11/30/2017, Historical Med      Insulin Syringe-Needle U-100 31G X 1/4\" 0.5 ML MISC 1 Syringe by Does not apply route, Starting Mon 10/8/2018, Historical Med      Lancet Devices (LANCING DEVICE) MISC Inject 1 Device under the skin, Starting Mon 3/5/2018, Historical Med      lisinopril (ZESTRIL) 20 mg tablet Take 20 mg by mouth daily, Historical Med      ondansetron (ZOFRAN) 4 mg tablet Take 1 tablet (4 mg total) by mouth every 8 (eight) hours as needed for vomiting or nausea, Starting Tue 9/3/2024, Print      Riboflavin (Vitamin B-2) 50 MG TABS Take 1 tablet by mouth daily, Starting Tue 1/2/2024, Historical Med      semaglutide, 0.25 or 0.5 mg/dose, (Ozempic, 0.25 or 0.5 MG/DOSE,) 2 " mg/3 mL injection pen Inject 0.5 mg under the skin Once a week, Starting Tue 1/2/2024, Historical Med      sildenafil (VIAGRA) 100 mg tablet Take 100 mg by mouth, Starting Mon 8/1/2022, Until Sat 2/24/2024 at 2359, Historical Med      sucralfate (CARAFATE) 1 g tablet Take 1 tablet (1 g total) by mouth 4 (four) times a day for 7 days, Starting Tue 9/3/2024, Until Tue 9/10/2024, Print             No discharge procedures on file.    PDMP Review         Value Time User    PDMP Reviewed  Yes 9/22/2022  9:10 PM Lobo Gentile PA-C            ED Provider  Electronically Signed by             Caitlyn Hopkins MD  09/09/24 6341

## 2024-09-10 LAB
ATRIAL RATE: 67 BPM
P AXIS: 54 DEGREES
PR INTERVAL: 166 MS
QRS AXIS: 1 DEGREES
QRSD INTERVAL: 92 MS
QT INTERVAL: 392 MS
QTC INTERVAL: 414 MS
T WAVE AXIS: 76 DEGREES
VENTRICULAR RATE: 67 BPM

## 2024-09-10 PROCEDURE — 93010 ELECTROCARDIOGRAM REPORT: CPT | Performed by: INTERNAL MEDICINE

## 2024-12-10 ENCOUNTER — OFFICE VISIT (OUTPATIENT)
Dept: PODIATRY | Facility: CLINIC | Age: 59
End: 2024-12-10
Payer: COMMERCIAL

## 2024-12-10 ENCOUNTER — TELEPHONE (OUTPATIENT)
Age: 59
End: 2024-12-10

## 2024-12-10 VITALS
BODY MASS INDEX: 27.06 KG/M2 | SYSTOLIC BLOOD PRESSURE: 142 MMHG | WEIGHT: 194 LBS | HEART RATE: 76 BPM | DIASTOLIC BLOOD PRESSURE: 77 MMHG

## 2024-12-10 DIAGNOSIS — B35.1 ONYCHOMYCOSIS: ICD-10-CM

## 2024-12-10 DIAGNOSIS — M79.675 PAIN IN TOES OF BOTH FEET: ICD-10-CM

## 2024-12-10 DIAGNOSIS — M79.674 PAIN IN TOES OF BOTH FEET: ICD-10-CM

## 2024-12-10 DIAGNOSIS — L85.3 XEROSIS OF SKIN: ICD-10-CM

## 2024-12-10 DIAGNOSIS — E11.42 DIABETIC POLYNEUROPATHY ASSOCIATED WITH TYPE 2 DIABETES MELLITUS (HCC): Primary | ICD-10-CM

## 2024-12-10 PROCEDURE — 99203 OFFICE O/P NEW LOW 30 MIN: CPT | Performed by: PODIATRIST

## 2024-12-10 RX ORDER — NAPROXEN 500 MG/1
500 TABLET ORAL
COMMUNITY
Start: 2024-07-22 | End: 2025-07-22

## 2024-12-10 RX ORDER — LISINOPRIL AND HYDROCHLOROTHIAZIDE 20; 25 MG/1; MG/1
TABLET ORAL
COMMUNITY
Start: 2024-11-27

## 2024-12-10 RX ORDER — INSULIN ASPART 100 [IU]/ML
INJECTION, SOLUTION INTRAVENOUS; SUBCUTANEOUS
COMMUNITY
Start: 2024-11-11

## 2024-12-10 RX ORDER — INSULIN GLARGINE 100 [IU]/ML
30 INJECTION, SOLUTION SUBCUTANEOUS DAILY
COMMUNITY
Start: 2024-06-28

## 2024-12-10 RX ORDER — GLUCAGON INJECTION, SOLUTION 1 MG/.2ML
1 INJECTION, SOLUTION SUBCUTANEOUS
COMMUNITY
Start: 2024-06-28

## 2024-12-10 RX ORDER — EZETIMIBE 10 MG/1
TABLET ORAL
COMMUNITY

## 2024-12-10 RX ORDER — PREGABALIN 50 MG/1
50 CAPSULE ORAL DAILY
Qty: 30 CAPSULE | Refills: 2 | Status: SHIPPED | OUTPATIENT
Start: 2024-12-10 | End: 2025-03-10

## 2024-12-10 NOTE — PROGRESS NOTES
Name: Nahid Robertson      : 1965      MRN: [id number]  Encounter Provider: Regis Kirk DPM  Encounter Date: 12/10/2024   Encounter department: [facility] PODIATRY [place]  :  Assessment & Plan  Diabetic polyneuropathy associated with type 2 diabetes mellitus (HCC)    Lab Results   Component Value Date    HGBA1C 9.2 (H) 2023       Orders:    pregabalin (LYRICA) 50 mg capsule; Take 1 capsule (50 mg total) by mouth daily    Xerosis of skin       Pt was instructed to use lotion once a day on both feet such as cerave, Cetaphil or similar thick style of lotion.   He is to also increase his water intake to 6 to 8 16 ounce bottles of water in a day.  Pain in toes of both feet         Discussed proper shoe gear, daily inspections of feet, and general foot health with patient. Patient has Q9  findings and is recommended for at risk foot care every 9-10 weeks.    Patients most recent complete clinical foot exam was on: 2024      Return in about 10 weeks (around 2025).     History of Present Illness   HPI  Nahid Robertson is a 59 y.o. male who presents with chief complaint of pain in both of his feet along with numbing of the toes and stabbing in the midfoot area.  He is a diabetic who sugar is now stabilizing at around 150.  He had been on gabapentin several years ago for a period of approximately 2 to 3 months and noticed that the pain was increasing as far as neuropathy goes so he stopped the medication.  History obtained from: patient    Review of Systems  Medical History Reviewed by provider this encounter:     .  Current Outpatient Medications on File Prior to Visit   Medication Sig Dispense Refill    albuterol (PROVENTIL HFA,VENTOLIN HFA) 90 mcg/act inhaler Inhale 2 puffs every 4 (four) hours as needed for wheezing 1 Inhaler 0    Evolocumab 140 MG/ML SOAJ Inject 140 mg under the skin every 14 (fourteen) days      ezetimibe (ZETIA) 10 mg tablet TAKE ONE TABLET BY MOUTH DAILY AT 9AM    "   famotidine (PEPCID) 20 mg tablet Take 1 tablet (20 mg total) by mouth 2 (two) times a day 14 tablet 0    Glucagon (Gvoke HypoPen 2-Pack) 1 MG/0.2ML SOAJ Inject 1 mg under the skin      Insulin Glargine Solostar (Lantus SoloStar) 100 UNIT/ML SOPN Inject 30 Units under the skin daily      insulin glulisine (APIDRA) 100 units/mL injection TO BE USED VIA INSULIN PUMP-- UP  UNITS/DAY,      Insulin Pen Needle (PEN NEEDLES 29GX1/2\") 29G X 12MM MISC 1 Device by Does not apply route      Insulin Syringe-Needle U-100 31G X 1/4\" 0.5 ML MISC 1 Syringe by Does not apply route      Lancet Devices (LANCING DEVICE) MISC Inject 1 Device under the skin      lisinopril-hydrochlorothiazide (PRINZIDE,ZESTORETIC) 20-25 MG per tablet TAKE ONE TABLET BY MOUTH DAILY AT 9AM      naproxen (NAPROSYN) 500 mg tablet Take 500 mg by mouth      NovoLOG 100 UNIT/ML injection TO BE USED VIA INSULIN PUMP-UP  UNITS PER DAY      ondansetron (ZOFRAN) 4 mg tablet Take 1 tablet (4 mg total) by mouth every 8 (eight) hours as needed for vomiting or nausea 12 tablet 0    pantoprazole (PROTONIX) 40 mg tablet Take 1 tablet (40 mg total) by mouth daily for 30 doses 30 tablet 0    Riboflavin (Vitamin B-2) 50 MG TABS Take 1 tablet by mouth daily      semaglutide, 0.25 or 0.5 mg/dose, (Ozempic, 0.25 or 0.5 MG/DOSE,) 2 mg/3 mL injection pen Inject 0.5 mg under the skin Once a week      sucralfate (CARAFATE) 1 g tablet Take 1 tablet (1 g total) by mouth 4 (four) times a day for 7 days 28 tablet 0    sildenafil (VIAGRA) 100 mg tablet Take 100 mg by mouth      [DISCONTINUED] atorvastatin (LIPITOR) 40 mg tablet TAKE 1 TABLET BY MOUTH EVERY DAY AT NIGHT  3    [DISCONTINUED] fenofibrate (TRIGLIDE) 160 MG tablet Take 160 mg by mouth daily Not taking      [DISCONTINUED] lisinopril (ZESTRIL) 20 mg tablet Take 20 mg by mouth daily       No current facility-administered medications on file prior to visit.      Social History     Tobacco Use    Smoking status: " Former     Current packs/day: 0.25     Average packs/day: 0.3 packs/day for 25.0 years (6.3 ttl pk-yrs)     Types: Cigarettes    Smokeless tobacco: Never   Vaping Use    Vaping status: Never Used   Substance and Sexual Activity    Alcohol use: Yes     Comment: occassional    Drug use: No    Sexual activity: Yes     Partners: Female        Objective   /77 (BP Location: Left arm, Patient Position: Sitting, Cuff Size: Standard)   Pulse 76   Wt 88 kg (194 lb)   BMI 27.06 kg/m²      Physical Exam  Cardiovascular:      Pulses: Pulses are weak.           Dorsalis pedis pulses are 1+ on the right side and 1+ on the left side.        Posterior tibial pulses are 1+ on the right side and 1+ on the left side.   Feet:      Right foot:      Skin integrity: Callus and dry skin present. No ulcer, skin breakdown, erythema or warmth.      Left foot:      Skin integrity: Callus and dry skin present. No ulcer, skin breakdown, erythema or warmth.       Vascular status is 1/4 DP PT negative digital hair normal distal cooling immediate capillary refill bilaterally.  Capillary refill is approximately 2 seconds.    Derm nails are brittle elongated whitish-yellow discoloration with subungual debris x 2.  There is an increased thickness and the nails are approximately 1 to 2 mm.  There is white flaky tissue present on the dorsal and plantar aspect of both feet especially in the area of the heels bilaterally no signs of any fissures or dried blood present within the tissue.  There is loss of turgor noted bilaterally and thinning of the skin also seen bilaterally.    Ortho mild hammertoe deformities are present fourth and fifth digits bilaterally with the fifth digit being in adductovarus position and mild pes planus is seen bilaterally.    Neuro light touch and 0.5 monofilament test was intact and equal bilaterally.  The sites that were tested were the plantar aspect of the hallux, plantar aspect of the third and fourth toes, submet  3, and the plantar aspect of the arch.  Patient is experiencing numbing in his toes also cold feeling to both feet and severe stabbing present in the midfoot area.    Diabetic Foot Exam    Patient's shoes and socks removed.    Right Foot/Ankle   Right Foot Inspection  Skin Exam: dry skin, callus and callus. Skin not intact, no warmth, no erythema, no maceration, no abnormal color, no pre-ulcer and no ulcer.     Toe Exam: right toe deformity. No swelling, no tenderness and erythema    Sensory   Vibration: intact  Proprioception: intact  Monofilament testing: intact    Vascular  Capillary refills: < 3 seconds  The right DP pulse is 1+. The right PT pulse is 1+.     Left Foot/Ankle  Left Foot Inspection  Skin Exam: dry skin and callus. Skin not intact, no warmth, no erythema, no maceration, normal color, no pre-ulcer and no ulcer.     Toe Exam: left toe deformity. No swelling, no tenderness and no erythema.     Sensory   Vibration: intact  Proprioception: intact  Monofilament testing: intact    Vascular  Capillary refills: < 3 seconds  The left DP pulse is 1+. The left PT pulse is 1+.     Assign Risk Category  Deformity present  No loss of protective sensation  Weak pulses  Risk: 2    Administrative Statements   I have spent a total time of 15 minutes in caring for this patient on the day of the visit/encounter including Risks and benefits of tx options, Instructions for management, Patient and family education, Importance of tx compliance, Risk factor reductions, Counseling / Coordination of care, Documenting in the medical record, and Obtaining or reviewing history  .

## 2024-12-10 NOTE — TELEPHONE ENCOUNTER
PA for pregabalin 50mg SUBMITTED to t    via    [x]CMM-KEY:[id number]  []Surescripts-Case ID #   []Availity-Auth ID # NDC #   []Faxed to plan   []Other website   []Phone call Case ID #     [x]PA sent as URGENT    All office notes, labs and other pertaining documents and studies sent. Clinical questions answered. Awaiting determination from insurance company.     Turnaround time for your insurance to make a decision on your Prior Authorization can take 7-21 business days.

## 2024-12-12 NOTE — TELEPHONE ENCOUNTER
PA for PRGABALIN 50MG  APPROVED     Date(s) approved 2/1/2024-12/31/2025    Patient advised by          []MyChart Message  []Phone call   []LMOM  []L/M to call office as no active Communication consent on file  []Unable to leave detailed message as VM not approved on Communication consent   CALLED PT WAS NOT ABLE TO HEAR ME     Pharmacy advised by    [x]Fax  []Phone call    Approval letter scanned into Media Yes

## 2025-03-09 ENCOUNTER — HOSPITAL ENCOUNTER (EMERGENCY)
Facility: HOSPITAL | Age: 60
Discharge: HOME | End: 2025-03-09
Payer: COMMERCIAL

## 2025-03-09 ENCOUNTER — APPOINTMENT (EMERGENCY)
Dept: RADIOLOGY | Facility: HOSPITAL | Age: 60
End: 2025-03-09
Payer: COMMERCIAL

## 2025-03-09 VITALS
HEART RATE: 79 BPM | TEMPERATURE: 98 F | SYSTOLIC BLOOD PRESSURE: 193 MMHG | DIASTOLIC BLOOD PRESSURE: 88 MMHG | WEIGHT: 196 LBS | BODY MASS INDEX: 27.34 KG/M2 | OXYGEN SATURATION: 92 % | RESPIRATION RATE: 24 BRPM

## 2025-03-09 DIAGNOSIS — J45.901 ASTHMA EXACERBATION: Primary | ICD-10-CM

## 2025-03-09 LAB
ANION GAP SERPL CALCULATED.3IONS-SCNC: 7 MMOL/L (ref 4–13)
BASE EX.OXY STD BLDV CALC-SCNC: 89.5 % (ref 60–80)
BASE EXCESS BLDV CALC-SCNC: 1.6 MMOL/L
BASOPHILS # BLD AUTO: 0.01 THOUSANDS/ÂΜL (ref 0–0.1)
BASOPHILS NFR BLD AUTO: 0 % (ref 0–1)
BUN SERPL-MCNC: 15 MG/DL (ref 5–25)
CALCIUM SERPL-MCNC: 9.2 MG/DL (ref 8.4–10.2)
CHLORIDE SERPL-SCNC: 103 MMOL/L (ref 96–108)
CO2 SERPL-SCNC: 27 MMOL/L (ref 21–32)
CREAT SERPL-MCNC: 0.84 MG/DL (ref 0.6–1.3)
EOSINOPHIL # BLD AUTO: 0.47 THOUSAND/ÂΜL (ref 0–0.61)
EOSINOPHIL NFR BLD AUTO: 6 % (ref 0–6)
ERYTHROCYTE [DISTWIDTH] IN BLOOD BY AUTOMATED COUNT: 15 % (ref 11.6–15.1)
FLUAV RNA RESP QL NAA+PROBE: NEGATIVE
FLUBV RNA RESP QL NAA+PROBE: NEGATIVE
GFR SERPL CREATININE-BSD FRML MDRD: 96 ML/MIN/1.73SQ M
GLUCOSE SERPL-MCNC: 99 MG/DL (ref 65–140)
HCO3 BLDV-SCNC: 26.7 MMOL/L (ref 24–30)
HCT VFR BLD AUTO: 43.8 % (ref 36.5–49.3)
HGB BLD-MCNC: 14.1 G/DL (ref 12–17)
IMM GRANULOCYTES # BLD AUTO: 0.02 THOUSAND/UL (ref 0–0.2)
IMM GRANULOCYTES NFR BLD AUTO: 0 % (ref 0–2)
LYMPHOCYTES # BLD AUTO: 3.33 THOUSANDS/ÂΜL (ref 0.6–4.47)
LYMPHOCYTES NFR BLD AUTO: 41 % (ref 14–44)
MCH RBC QN AUTO: 28.5 PG (ref 26.8–34.3)
MCHC RBC AUTO-ENTMCNC: 32.2 G/DL (ref 31.4–37.4)
MCV RBC AUTO: 89 FL (ref 82–98)
MONOCYTES # BLD AUTO: 1.02 THOUSAND/ÂΜL (ref 0.17–1.22)
MONOCYTES NFR BLD AUTO: 12 % (ref 4–12)
NEUTROPHILS # BLD AUTO: 3.37 THOUSANDS/ÂΜL (ref 1.85–7.62)
NEUTS SEG NFR BLD AUTO: 41 % (ref 43–75)
NRBC BLD AUTO-RTO: 0 /100 WBCS
O2 CT BLDV-SCNC: 18.6 ML/DL
PCO2 BLDV: 43.9 MM HG (ref 42–50)
PH BLDV: 7.4 [PH] (ref 7.3–7.4)
PLATELET # BLD AUTO: 235 THOUSANDS/UL (ref 149–390)
PMV BLD AUTO: 8.8 FL (ref 8.9–12.7)
PO2 BLDV: 63.8 MM HG (ref 35–45)
POTASSIUM SERPL-SCNC: 3.8 MMOL/L (ref 3.5–5.3)
RBC # BLD AUTO: 4.94 MILLION/UL (ref 3.88–5.62)
RSV RNA RESP QL NAA+PROBE: NEGATIVE
SARS-COV-2 RNA RESP QL NAA+PROBE: NEGATIVE
SODIUM SERPL-SCNC: 137 MMOL/L (ref 135–147)
WBC # BLD AUTO: 8.22 THOUSAND/UL (ref 4.31–10.16)

## 2025-03-09 PROCEDURE — 96365 THER/PROPH/DIAG IV INF INIT: CPT

## 2025-03-09 PROCEDURE — 96375 TX/PRO/DX INJ NEW DRUG ADDON: CPT

## 2025-03-09 PROCEDURE — 82805 BLOOD GASES W/O2 SATURATION: CPT

## 2025-03-09 PROCEDURE — 36415 COLL VENOUS BLD VENIPUNCTURE: CPT

## 2025-03-09 PROCEDURE — 0241U HB NFCT DS VIR RESP RNA 4 TRGT: CPT

## 2025-03-09 PROCEDURE — 80048 BASIC METABOLIC PNL TOTAL CA: CPT

## 2025-03-09 PROCEDURE — 94644 CONT INHLJ TX 1ST HOUR: CPT

## 2025-03-09 PROCEDURE — 99291 CRITICAL CARE FIRST HOUR: CPT

## 2025-03-09 PROCEDURE — 94664 DEMO&/EVAL PT USE INHALER: CPT

## 2025-03-09 PROCEDURE — 85025 COMPLETE CBC W/AUTO DIFF WBC: CPT

## 2025-03-09 PROCEDURE — 96366 THER/PROPH/DIAG IV INF ADDON: CPT

## 2025-03-09 PROCEDURE — 94760 N-INVAS EAR/PLS OXIMETRY 1: CPT

## 2025-03-09 PROCEDURE — 99285 EMERGENCY DEPT VISIT HI MDM: CPT

## 2025-03-09 PROCEDURE — 93005 ELECTROCARDIOGRAM TRACING: CPT

## 2025-03-09 PROCEDURE — 71045 X-RAY EXAM CHEST 1 VIEW: CPT

## 2025-03-09 RX ORDER — METHYLPREDNISOLONE SODIUM SUCCINATE 125 MG/2ML
125 INJECTION, POWDER, LYOPHILIZED, FOR SOLUTION INTRAMUSCULAR; INTRAVENOUS ONCE
Status: COMPLETED | OUTPATIENT
Start: 2025-03-09 | End: 2025-03-09

## 2025-03-09 RX ORDER — ALBUTEROL SULFATE 90 UG/1
1-2 INHALANT RESPIRATORY (INHALATION) EVERY 6 HOURS PRN
Qty: 18 G | Refills: 0 | Status: SHIPPED | OUTPATIENT
Start: 2025-03-09

## 2025-03-09 RX ORDER — PREDNISONE 20 MG/1
40 TABLET ORAL DAILY
Qty: 8 TABLET | Refills: 0 | Status: SHIPPED | OUTPATIENT
Start: 2025-03-09 | End: 2025-03-13

## 2025-03-09 RX ORDER — ALBUTEROL SULFATE 90 UG/1
2 INHALANT RESPIRATORY (INHALATION) ONCE
Status: COMPLETED | OUTPATIENT
Start: 2025-03-09 | End: 2025-03-09

## 2025-03-09 RX ORDER — ALBUTEROL SULFATE 0.83 MG/ML
2.5 SOLUTION RESPIRATORY (INHALATION) ONCE
Status: COMPLETED | OUTPATIENT
Start: 2025-03-09 | End: 2025-03-09

## 2025-03-09 RX ORDER — ALBUTEROL SULFATE 5 MG/ML
2.5 SOLUTION RESPIRATORY (INHALATION) ONCE
Status: DISCONTINUED | OUTPATIENT
Start: 2025-03-09 | End: 2025-03-09

## 2025-03-09 RX ORDER — ALBUTEROL SULFATE 5 MG/ML
10 SOLUTION RESPIRATORY (INHALATION) ONCE
Status: COMPLETED | OUTPATIENT
Start: 2025-03-09 | End: 2025-03-09

## 2025-03-09 RX ORDER — MAGNESIUM SULFATE HEPTAHYDRATE 40 MG/ML
2 INJECTION, SOLUTION INTRAVENOUS ONCE
Status: COMPLETED | OUTPATIENT
Start: 2025-03-09 | End: 2025-03-09

## 2025-03-09 RX ORDER — SODIUM CHLORIDE FOR INHALATION 0.9 %
12 VIAL, NEBULIZER (ML) INHALATION ONCE
Status: COMPLETED | OUTPATIENT
Start: 2025-03-09 | End: 2025-03-09

## 2025-03-09 RX ADMIN — IPRATROPIUM BROMIDE 1 MG: 0.5 SOLUTION RESPIRATORY (INHALATION) at 03:58

## 2025-03-09 RX ADMIN — ISODIUM CHLORIDE 12 ML: 0.03 SOLUTION RESPIRATORY (INHALATION) at 03:58

## 2025-03-09 RX ADMIN — ALBUTEROL SULFATE 2 PUFF: 90 AEROSOL, METERED RESPIRATORY (INHALATION) at 07:06

## 2025-03-09 RX ADMIN — METHYLPREDNISOLONE SODIUM SUCCINATE 125 MG: 125 INJECTION, POWDER, FOR SOLUTION INTRAMUSCULAR; INTRAVENOUS at 04:27

## 2025-03-09 RX ADMIN — ALBUTEROL SULFATE 2.5 MG: 2.5 SOLUTION RESPIRATORY (INHALATION) at 06:01

## 2025-03-09 RX ADMIN — ALBUTEROL SULFATE 10 MG: 2.5 SOLUTION RESPIRATORY (INHALATION) at 03:58

## 2025-03-09 RX ADMIN — MAGNESIUM SULFATE HEPTAHYDRATE 2 G: 40 INJECTION, SOLUTION INTRAVENOUS at 04:26

## 2025-03-09 NOTE — ED PROVIDER NOTES
"Time reflects when diagnosis was documented in both MDM as applicable and the Disposition within this note       Time User Action Codes Description Comment    3/9/2025  6:52 AM Aj Spivey [J45.21] Mild intermittent asthma with exacerbation     3/9/2025  6:52 AM Aj Spivey Remove [J45.21] Mild intermittent asthma with exacerbation     3/9/2025  6:52 AM Aj Spivey [J45.901] Asthma exacerbation           ED Disposition       ED Disposition   Discharge    Condition   Stable    Date/Time   Sun Mar 9, 2025  6:51 AM    Comment   Nahid Robertson discharge to home/self care.                   Assessment & Plan       Medical Decision Making  Medical complexity: 59-year-old male presenting with chest tightness, shortness of breath, and symptoms consistent with prior asthma exacerbations.  He believes he has been in the exacerbation for approximately 3 days now.  He arrives in mild respiratory distress.  Will administer long nebulizer treatment with albuterol and ipratropium, will load with steroid, and will administer IV magnesium given the severity of his symptoms.  Patient will need close observation after that, if he were to need frequent nebulizer, or supplemental oxygen, may need admission.  Otherwise will require several days of ongoing steroids, frequent scheduled albuterol at home, and close follow-up with strict return precautions.  Will watch on pulse oximetry monitoring, and dispo appropriately.    Reassessment/disposition: Patient responded well to first nebulizer treatment, and had significantly increased wheezing with better air entry on reassessment.  After about an hour, the patient began to have increasing wheezing and therefore I did administer a second nebulizer treatment.  This cleared the patient almost entirely.  He states that his breathing feels \"normal\" now.  He was observed for some time after his nebulizer treatment.  He then was ambulated around the emergency department and " states that he feels significantly better and would like to be discharged home.  I offered the patient further observation here in the emergency department but he would prefer to self observe his symptoms at home and come back to the emergency department if he begins to feel short of breath again.  Patient was discharged in no acute distress with normal work of breathing, he had inhaler in hand at time of discharge, and prescription for ongoing steroid treatment at home.  He understands that if his breathing were to become worse at home then he should come immediately back to the emergency department as he is high risk for decompensation if his asthma were to become uncontrolled.     Critical Care Time Statement: Upon my evaluation, this patient had a high probability of imminent or life-threatening deterioration due to Acute Respiratory Failure/ Asthma exacerbation, which required my direct attention, intervention, and personal management.  I spent a total of 32 minutes directly providing critical care services, including interpretation of complex medical databases, evaluating for the presence of life-threatening injuries or illnesses, management of organ system failure(s) , and complex medical decision making (to support/prevent further life-threatening deterioration).. This time is exclusive of procedures, teaching, treating other patients, family meetings, and any prior time recorded by providers other than myself.       Amount and/or Complexity of Data Reviewed  Labs: ordered. Decision-making details documented in ED Course.  Radiology: ordered and independent interpretation performed.    Risk  Prescription drug management.        ED Course as of 03/09/25 0752   Sun Mar 09, 2025   2462 Patient reassessed at bedside, he is currently breathing more comfortably, maintains oxygen saturation greater than 90% on room air with facemask.  He has approximately one half of his nebulizer remaining in canister.  Patient  seated up comfortably on stretcher watching television currently.   0435 Blood gas, venous(!)  Reassuring     0609 Potassium: 3.8   0649 Patient was ambulated around the emergency department, over 200 steps.  He maintained his SpO2 greater than 94% at all times.       Medications   methylPREDNISolone sodium succinate (Solu-MEDROL) injection 125 mg (125 mg Intravenous Given 3/9/25 0427)   magnesium sulfate 2 g/50 mL IVPB (premix) 2 g (0 g Intravenous Stopped 3/9/25 0558)   albuterol inhalation solution 10 mg (10 mg Nebulization Given 3/9/25 0358)   ipratropium (ATROVENT) 0.02 % inhalation solution 1 mg (1 mg Nebulization Given 3/9/25 0358)   sodium chloride 0.9 % inhalation solution 12 mL (12 mL Nebulization Given 3/9/25 0358)   albuterol inhalation solution 2.5 mg (2.5 mg Nebulization Given 3/9/25 0601)   albuterol (PROVENTIL HFA,VENTOLIN HFA) inhaler 2 puff (2 puffs Inhalation Given 3/9/25 0706)       ED Risk Strat Scores                            SBIRT 22yo+      Flowsheet Row Most Recent Value   Initial Alcohol Screen: US AUDIT-C     1. How often do you have a drink containing alcohol? 0 Filed at: 03/09/2025 0414   2. How many drinks containing alcohol do you have on a typical day you are drinking?  0 Filed at: 03/09/2025 0414   3a. Male UNDER 65: How often do you have five or more drinks on one occasion? 0 Filed at: 03/09/2025 0414   3b. FEMALE Any Age, or MALE 65+: How often do you have 4 or more drinks on one occassion? 0 Filed at: 03/09/2025 0414   Audit-C Score 0 Filed at: 03/09/2025 0414   MARCUS: How many times in the past year have you...    Used an illegal drug or used a prescription medication for non-medical reasons? Never Filed at: 03/09/2025 0414                            History of Present Illness       Chief Complaint   Patient presents with    Respiratory Distress     Pt has HX of asthma been SOB few days family made him come to the er via EMS        Past Medical History:   Diagnosis Date    FLORENCE  (acute kidney injury) (HCC) 12/8/2017    Asthma     Cardiac murmur     COPD (chronic obstructive pulmonary disease) (HCC)     Diabetes mellitus (HCC)     Hyperlipidemia     Hypertension       Past Surgical History:   Procedure Laterality Date    APPENDECTOMY      teenager       Family History   Problem Relation Age of Onset    Hypertension Mother     Hypertension Father       Social History     Tobacco Use    Smoking status: Former     Current packs/day: 0.25     Average packs/day: 0.3 packs/day for 25.0 years (6.3 ttl pk-yrs)     Types: Cigarettes    Smokeless tobacco: Never   Vaping Use    Vaping status: Never Used   Substance Use Topics    Alcohol use: Yes     Comment: occassional    Drug use: No      E-Cigarette/Vaping    E-Cigarette Use Never User       E-Cigarette/Vaping Substances    Nicotine No     THC No     CBD No     Flavoring No     Other No     Unknown No       I have reviewed and agree with the history as documented.     This is a 59-year-old male who arrives in mild respiratory distress with complaint of shortness of breath, chest tightness.  Patient reports for the last few days, he is a been experiencing worsening asthma symptoms which include chest tightness and shortness of breath.  He has been utilizing his asthma MDI (albuterol) much more frequently than he typically does.  He notes a mild cough which is nonproductive of sputum over the past few days as well.  EMS reports that they found the patient to be hypoxic upon arrival, placed him on a nebulizer treatment and brought him to the hospital.  Patient states that he currently feels minimal improvement after starting the nebulizer.  He is denying any fevers or chills.  He denies any chest pain, but endorses chest tightness symptoms.  He denies any leg swelling, history of malignancy, or history of blood clot.  Patient does have a history of hypertension, hyperlipidemia, and type 2 diabetes.  Patient does have a pacemaker in place due to prior  history of frequent sinus pauses.        Review of Systems   Constitutional:  Negative for chills, fatigue and fever.   HENT:  Negative for congestion and sore throat.    Eyes:  Negative for pain and visual disturbance.   Respiratory:  Positive for cough, chest tightness and shortness of breath.    Cardiovascular:  Negative for chest pain and palpitations.   Gastrointestinal:  Negative for abdominal pain, blood in stool, constipation, diarrhea, nausea and vomiting.   Genitourinary:  Negative for dysuria, flank pain and hematuria.   Musculoskeletal:  Negative for arthralgias, back pain and neck pain.   Skin:  Negative for color change and rash.   Neurological:  Negative for dizziness, syncope and light-headedness.   Hematological:  Negative for adenopathy. Does not bruise/bleed easily.   All other systems reviewed and are negative.          Objective       ED Triage Vitals [03/09/25 0355]   Temperature Pulse Blood Pressure Respirations SpO2 Patient Position - Orthostatic VS   98 °F (36.7 °C) 79 (!) 193/88 (!) 24 92 % Sitting      Temp Source Heart Rate Source BP Location FiO2 (%) Pain Score    Temporal Monitor Right arm -- No Pain      Vitals      Date and Time Temp Pulse SpO2 Resp BP Pain Score FACES Pain Rating User   03/09/25 0355 98 °F (36.7 °C) 79 -- 24 193/88 No Pain -- RJP   03/09/25 0355 -- -- 92 % -- -- -- -- MD            Physical Exam  Vitals and nursing note reviewed.   Constitutional:       General: He is in acute distress.      Appearance: He is well-developed and normal weight. He is not toxic-appearing or diaphoretic.   HENT:      Head: Normocephalic and atraumatic.      Right Ear: External ear normal.      Left Ear: External ear normal.      Nose: Nose normal. No congestion or rhinorrhea.      Mouth/Throat:      Mouth: Mucous membranes are moist.      Pharynx: No oropharyngeal exudate or posterior oropharyngeal erythema.   Eyes:      General: No scleral icterus.     Extraocular Movements: Extraocular  movements intact.      Conjunctiva/sclera: Conjunctivae normal.      Pupils: Pupils are equal, round, and reactive to light.   Cardiovascular:      Rate and Rhythm: Normal rate and regular rhythm.      Pulses: Normal pulses.      Heart sounds: No murmur heard.  Pulmonary:      Effort: Respiratory distress present.      Breath sounds: Wheezing present. No rales.      Comments: Diminished breath sounds bilaterally, significant wheezing heard in all lung fields, tachypnea noted, mild accessory muscle usage.  Patient does not have any conversational dyspnea, able to speak in full sentences without stopping to catch his breath.  Patient maintains SpO2 over goal of 90% throughout initial interview on room air.  Abdominal:      General: Abdomen is flat. There is no distension.   Musculoskeletal:         General: No swelling. Normal range of motion.      Cervical back: Normal range of motion and neck supple.      Right lower leg: No edema.      Left lower leg: No edema.   Skin:     General: Skin is warm and dry.      Capillary Refill: Capillary refill takes less than 2 seconds.   Neurological:      General: No focal deficit present.      Mental Status: He is alert and oriented to person, place, and time.   Psychiatric:         Mood and Affect: Mood normal.         Behavior: Behavior normal.         Results Reviewed       Procedure Component Value Units Date/Time    FLU/RSV/COVID - if FLU/RSV clinically relevant (2hr TAT) [[id number]]  (Normal) Collected: 03/09/25 0409    Lab Status: Final result Specimen: Nares from Nose Updated: 03/09/25 0524     SARS-CoV-2 Negative     INFLUENZA A PCR Negative     INFLUENZA B PCR Negative     RSV PCR Negative    Narrative:      This test has been performed using the CoV-2/Flu/RSV plus assay on the [employer] Gene[employer]pert platform. This test has been validated by the  and verified by the performing laboratory.     This test is designed to amplify and detect the following: nucleocapsid  (N), envelope (E), and RNA-dependent RNA polymerase (RdRP) genes of the SARS-CoV-2 genome; matrix (M), basic polymerase (PB2), and acidic protein (PA) segments of the influenza A genome; matrix (M) and non-structural protein (NS) segments of the influenza B genome, and the nucleocapsid genes of RSV A and RSV B.     Positive results are indicative of the presence of Flu A, Flu B, RSV, and/or SARS-CoV-2 RNA. Positive results for SARS-CoV-2 or suspected novel influenza should be reported to state, local, or federal health departments according to local reporting requirements.      All results should be assessed in conjunction with clinical presentation and other laboratory markers for clinical management.     FOR PEDIATRIC PATIENTS - copy/paste COVID Guidelines URL to browser: [email or web address]       Basic metabolic panel [[id number]] Collected: 03/09/25 0409    Lab Status: Final result Specimen: Blood from Arm, Left Updated: 03/09/25 0437     Sodium 137 mmol/L      Potassium 3.8 mmol/L      Chloride 103 mmol/L      CO2 27 mmol/L      ANION GAP 7 mmol/L      BUN 15 mg/dL      Creatinine 0.84 mg/dL      Glucose 99 mg/dL      Calcium 9.2 mg/dL      eGFR 96 ml/min/1.73sq m     Narrative:      National Kidney Disease Foundation guidelines for Chronic Kidney Disease (CKD):     Stage 1 with normal or high GFR (GFR > 90 mL/min/1.73 square meters)    Stage 2 Mild CKD (GFR = 60-89 mL/min/1.73 square meters)    Stage 3A Moderate CKD (GFR = 45-59 mL/min/1.73 square meters)    Stage 3B Moderate CKD (GFR = 30-44 mL/min/1.73 square meters)    Stage 4 Severe CKD (GFR = 15-29 mL/min/1.73 square meters)    Stage 5 End Stage CKD (GFR <15 mL/min/1.73 square meters)  Note: GFR calculation is accurate only with a steady state creatinine    Blood gas, venous [[id number]]  (Abnormal) Collected: 03/09/25 0409    Lab Status: Final result Specimen: Blood from Arm, Left Updated: 03/09/25  "0426     pH, Humphrey 7.402     pCO2, Humphrey 43.9 mm Hg      pO2, Humphrey 63.8 mm Hg      HCO3, Humphrey 26.7 mmol/L      Base Excess, Humphrey 1.6 mmol/L      O2 Content, Humphrey 18.6 ml/dL      O2 HGB, VENOUS 89.5 %     CBC and differential [[id number]]  (Abnormal) Collected: 25 0409    Lab Status: Final result Specimen: Blood from Arm, Left Updated: 25     WBC 8.22 Thousand/uL      RBC 4.94 Million/uL      Hemoglobin 14.1 g/dL      Hematocrit 43.8 %      MCV 89 fL      MCH 28.5 pg      MCHC 32.2 g/dL      RDW 15.0 %      MPV 8.8 fL      Platelets 235 Thousands/uL      nRBC 0 /100 WBCs      Segmented % 41 %      Immature Grans % 0 %      Lymphocytes % 41 %      Monocytes % 12 %      Eosinophils Relative 6 %      Basophils Relative 0 %      Absolute Neutrophils 3.37 Thousands/µL      Absolute Immature Grans 0.02 Thousand/uL      Absolute Lymphocytes 3.33 Thousands/µL      Absolute Monocytes 1.02 Thousand/µL      Eosinophils Absolute 0.47 Thousand/µL      Basophils Absolute 0.01 Thousands/µL             XR chest portable   ED Interpretation by Aj Spivey MD ([id number])   No focal infiltrate, no pneumomediastinum or pneumothorax, no other acute cardiopulmonary disease noted          Procedures    ED Medication and Procedure Management   Prior to Admission Medications   Prescriptions Last Dose Informant Patient Reported? Taking?   Evolocumab 140 MG/ML SOAJ  Self Yes No   Sig: Inject 140 mg under the skin every 14 (fourteen) days   Glucagon (Gvoke HypoPen 2-Pack) 1 MG/0.2ML SOAJ  Self Yes No   Sig: Inject 1 mg under the skin   Insulin Glargine Solostar (Lantus SoloStar) 100 UNIT/ML SOPN  Self Yes No   Sig: Inject 30 Units under the skin daily   Insulin Pen Needle (PEN NEEDLES 29GX1/2\") 29G X 12MM MISC  Self Yes No   Si Device by Does not apply route   Insulin Syringe-Needle U-100 31G X 1/4\" 0.5 ML MISC  Self Yes No   Si Syringe by Does not apply route   Lancet Devices (LANCING DEVICE) MISC  Self Yes No   Sig: " Inject 1 Device under the skin   NovoLOG 100 UNIT/ML injection  Self Yes No   Sig: TO BE USED VIA INSULIN PUMP-UP  UNITS PER DAY   Riboflavin (Vitamin B-2) 50 MG TABS  Self Yes No   Sig: Take 1 tablet by mouth daily   albuterol (PROVENTIL HFA,VENTOLIN HFA) 90 mcg/act inhaler  Self No No   Sig: Inhale 2 puffs every 4 (four) hours as needed for wheezing   ezetimibe (ZETIA) 10 mg tablet  Self Yes No   Sig: TAKE ONE TABLET BY MOUTH DAILY AT 9AM   famotidine (PEPCID) 20 mg tablet  Self No No   Sig: Take 1 tablet (20 mg total) by mouth 2 (two) times a day   insulin glulisine (APIDRA) 100 units/mL injection  Self Yes No   Sig: TO BE USED VIA INSULIN PUMP-- UP  UNITS/DAY,   lisinopril-hydrochlorothiazide (PRINZIDE,ZESTORETIC) 20-25 MG per tablet  Self Yes No   Sig: TAKE ONE TABLET BY MOUTH DAILY AT 9AM   naproxen (NAPROSYN) 500 mg tablet  Self Yes No   Sig: Take 500 mg by mouth   ondansetron (ZOFRAN) 4 mg tablet  Self No No   Sig: Take 1 tablet (4 mg total) by mouth every 8 (eight) hours as needed for vomiting or nausea   pantoprazole (PROTONIX) 40 mg tablet  Self No No   Sig: Take 1 tablet (40 mg total) by mouth daily for 30 doses   pregabalin (LYRICA) 50 mg capsule   No No   Sig: Take 1 capsule (50 mg total) by mouth daily   semaglutide, 0.25 or 0.5 mg/dose, (Ozempic, 0.25 or 0.5 MG/DOSE,) 2 mg/3 mL injection pen  Self Yes No   Sig: Inject 0.5 mg under the skin Once a week   sildenafil (VIAGRA) 100 mg tablet   Yes No   Sig: Take 100 mg by mouth   sucralfate (CARAFATE) 1 g tablet  Self No No   Sig: Take 1 tablet (1 g total) by mouth 4 (four) times a day for 7 days      Facility-Administered Medications: None     Discharge Medication List as of 3/9/2025  6:53 AM        START taking these medications    Details   !! albuterol (Ventolin HFA) 90 mcg/act inhaler Inhale 1-2 puffs every 6 (six) hours as needed for wheezing, Starting Sun 3/9/2025, Normal      predniSONE 20 mg tablet Take 2 tablets (40 mg total) by  "mouth daily for 4 days, Starting Sun 3/9/2025, Until Thu 3/13/2025, Normal       !! - Potential duplicate medications found. Please discuss with provider.        CONTINUE these medications which have NOT CHANGED    Details   !! albuterol (PROVENTIL HFA,VENTOLIN HFA) 90 mcg/act inhaler Inhale 2 puffs every 4 (four) hours as needed for wheezing, Starting Mon 9/17/2018, Print      Evolocumab 140 MG/ML SOAJ Inject 140 mg under the skin every 14 (fourteen) days, Starting Wed 9/27/2023, Historical Med      ezetimibe (ZETIA) 10 mg tablet TAKE ONE TABLET BY MOUTH DAILY AT 9AM, Historical Med      famotidine (PEPCID) 20 mg tablet Take 1 tablet (20 mg total) by mouth 2 (two) times a day, Starting Tue 9/3/2024, Print      Glucagon (Gvoke HypoPen 2-Pack) 1 MG/0.2ML SOAJ Inject 1 mg under the skin, Starting Fri 6/28/2024, Historical Med      Insulin Glargine Solostar (Lantus SoloStar) 100 UNIT/ML SOPN Inject 30 Units under the skin daily, Starting Fri 6/28/2024, Historical Med      insulin glulisine (APIDRA) 100 units/mL injection TO BE USED VIA INSULIN PUMP-- UP  UNITS/DAY,, Historical Med      Insulin Pen Needle (PEN NEEDLES 29GX1/2\") 29G X 12MM MISC 1 Device by Does not apply route, Starting Thu 11/30/2017, Historical Med      Insulin Syringe-Needle U-100 31G X 1/4\" 0.5 ML MISC 1 Syringe by Does not apply route, Starting Mon 10/8/2018, Historical Med      Lancet Devices (LANCING DEVICE) MISC Inject 1 Device under the skin, Starting Mon 3/5/2018, Historical Med      lisinopril-hydrochlorothiazide (PRINZIDE,ZESTORETIC) 20-25 MG per tablet TAKE ONE TABLET BY MOUTH DAILY AT 9AM, Historical Med      naproxen (NAPROSYN) 500 mg tablet Take 500 mg by mouth, Starting Mon 7/22/2024, Until Tue 7/22/2025 at 2359, Historical Med      NovoLOG 100 UNIT/ML injection TO BE USED VIA INSULIN PUMP-UP  UNITS PER DAY, Historical Med      ondansetron (ZOFRAN) 4 mg tablet Take 1 tablet (4 mg total) by mouth every 8 (eight) hours as " needed for vomiting or nausea, Starting Tue 9/3/2024, Print      pantoprazole (PROTONIX) 40 mg tablet Take 1 tablet (40 mg total) by mouth daily for 30 doses, Starting Mon 9/9/2024, Until Tue 12/10/2024, Normal      pregabalin (LYRICA) 50 mg capsule Take 1 capsule (50 mg total) by mouth daily, Starting Tue 12/10/2024, Until Mon 3/10/2025, Normal      Riboflavin (Vitamin B-2) 50 MG TABS Take 1 tablet by mouth daily, Starting Tue 1/2/2024, Historical Med      semaglutide, 0.25 or 0.5 mg/dose, (Ozempic, 0.25 or 0.5 MG/DOSE,) 2 mg/3 mL injection pen Inject 0.5 mg under the skin Once a week, Starting Tue 1/2/2024, Historical Med      sildenafil (VIAGRA) 100 mg tablet Take 100 mg by mouth, Starting Mon 8/1/2022, Until Sat 2/24/2024 at 2359, Historical Med      sucralfate (CARAFATE) 1 g tablet Take 1 tablet (1 g total) by mouth 4 (four) times a day for 7 days, Starting Tue 9/3/2024, Until Tue 12/10/2024, Print       !! - Potential duplicate medications found. Please discuss with provider.        No discharge procedures on file.  ED SEPSIS DOCUMENTATION   Time reflects when diagnosis was documented in both MDM as applicable and the Disposition within this note       Time User Action Codes Description Comment    3/9/2025  6:52 AM Aj Spivey [J45.21] Mild intermittent asthma with exacerbation     3/9/2025  6:52 AM Aj Spivey Remove [J45.21] Mild intermittent asthma with exacerbation     3/9/2025  6:52 AM Aj Spivey [J45.901] Asthma exacerbation                  Aj Spivey MD  03/09/25 0759

## 2025-03-09 NOTE — DISCHARGE INSTRUCTIONS
For the next 24 hours, please take scheduled albuterol treatments with your inhaler.  Take 4 puffs every 3 hours as scheduled.  You do not have to do this while you are sleeping.  If you feel very short of breath or having difficulty breathing come back to the emergency department immediately.  Please  and take your first dose of steroid today and take for the next 4 days until finished.

## 2025-03-10 LAB
ATRIAL RATE: 76 BPM
P AXIS: 67 DEGREES
PR INTERVAL: 164 MS
QRS AXIS: -35 DEGREES
QRSD INTERVAL: 96 MS
QT INTERVAL: 370 MS
QTC INTERVAL: 416 MS
T WAVE AXIS: 71 DEGREES
VENTRICULAR RATE: 76 BPM

## 2025-03-10 PROCEDURE — 93010 ELECTROCARDIOGRAM REPORT: CPT | Performed by: INTERNAL MEDICINE

## 2025-03-30 ENCOUNTER — HOSPITAL ENCOUNTER (EMERGENCY)
Facility: HOSPITAL | Age: 60
Discharge: HOME | End: 2025-03-30
Attending: EMERGENCY MEDICINE
Payer: COMMERCIAL

## 2025-03-30 ENCOUNTER — APPOINTMENT (EMERGENCY)
Dept: RADIOLOGY | Facility: HOSPITAL | Age: 60
End: 2025-03-30
Payer: COMMERCIAL

## 2025-03-30 VITALS
HEIGHT: 71 IN | TEMPERATURE: 98.7 F | BODY MASS INDEX: 27.44 KG/M2 | DIASTOLIC BLOOD PRESSURE: 71 MMHG | HEART RATE: 94 BPM | SYSTOLIC BLOOD PRESSURE: 122 MMHG | RESPIRATION RATE: 22 BRPM | WEIGHT: 196 LBS | OXYGEN SATURATION: 91 %

## 2025-03-30 DIAGNOSIS — J40 BRONCHITIS: ICD-10-CM

## 2025-03-30 DIAGNOSIS — J45.901 ASTHMA EXACERBATION: ICD-10-CM

## 2025-03-30 DIAGNOSIS — R06.00 DYSPNEA: Primary | ICD-10-CM

## 2025-03-30 LAB
ALBUMIN SERPL BCG-MCNC: 4.2 G/DL (ref 3.5–5)
ALP SERPL-CCNC: 96 U/L (ref 34–104)
ALT SERPL W P-5'-P-CCNC: 34 U/L (ref 7–52)
ANION GAP SERPL CALCULATED.3IONS-SCNC: 12 MMOL/L (ref 4–13)
AST SERPL W P-5'-P-CCNC: 33 U/L (ref 13–39)
BASOPHILS # BLD AUTO: 0.02 THOUSANDS/ÂΜL (ref 0–0.1)
BASOPHILS NFR BLD AUTO: 0 % (ref 0–1)
BILIRUB SERPL-MCNC: 0.9 MG/DL (ref 0.2–1)
BUN SERPL-MCNC: 15 MG/DL (ref 5–25)
CALCIUM SERPL-MCNC: 9.4 MG/DL (ref 8.4–10.2)
CHLORIDE SERPL-SCNC: 96 MMOL/L (ref 96–108)
CO2 SERPL-SCNC: 27 MMOL/L (ref 21–32)
CREAT SERPL-MCNC: 1 MG/DL (ref 0.6–1.3)
EOSINOPHIL # BLD AUTO: 0.77 THOUSAND/ÂΜL (ref 0–0.61)
EOSINOPHIL NFR BLD AUTO: 8 % (ref 0–6)
ERYTHROCYTE [DISTWIDTH] IN BLOOD BY AUTOMATED COUNT: 14.6 % (ref 11.6–15.1)
FLUAV AG UPPER RESP QL IA.RAPID: NEGATIVE
FLUBV AG UPPER RESP QL IA.RAPID: NEGATIVE
GFR SERPL CREATININE-BSD FRML MDRD: 82 ML/MIN/1.73SQ M
GLUCOSE SERPL-MCNC: 315 MG/DL (ref 65–140)
HCT VFR BLD AUTO: 43.6 % (ref 36.5–49.3)
HGB BLD-MCNC: 14 G/DL (ref 12–17)
IMM GRANULOCYTES # BLD AUTO: 0.01 THOUSAND/UL (ref 0–0.2)
IMM GRANULOCYTES NFR BLD AUTO: 0 % (ref 0–2)
LYMPHOCYTES # BLD AUTO: 3.29 THOUSANDS/ÂΜL (ref 0.6–4.47)
LYMPHOCYTES NFR BLD AUTO: 35 % (ref 14–44)
MCH RBC QN AUTO: 28.2 PG (ref 26.8–34.3)
MCHC RBC AUTO-ENTMCNC: 32.1 G/DL (ref 31.4–37.4)
MCV RBC AUTO: 88 FL (ref 82–98)
MONOCYTES # BLD AUTO: 0.73 THOUSAND/ÂΜL (ref 0.17–1.22)
MONOCYTES NFR BLD AUTO: 8 % (ref 4–12)
NEUTROPHILS # BLD AUTO: 4.7 THOUSANDS/ÂΜL (ref 1.85–7.62)
NEUTS SEG NFR BLD AUTO: 49 % (ref 43–75)
NRBC BLD AUTO-RTO: 0 /100 WBCS
PLATELET # BLD AUTO: 275 THOUSANDS/UL (ref 149–390)
PMV BLD AUTO: 9.3 FL (ref 8.9–12.7)
POTASSIUM SERPL-SCNC: 3.6 MMOL/L (ref 3.5–5.3)
PROT SERPL-MCNC: 8 G/DL (ref 6.4–8.4)
RBC # BLD AUTO: 4.96 MILLION/UL (ref 3.88–5.62)
SARS-COV+SARS-COV-2 AG RESP QL IA.RAPID: NEGATIVE
SODIUM SERPL-SCNC: 135 MMOL/L (ref 135–147)
WBC # BLD AUTO: 9.52 THOUSAND/UL (ref 4.31–10.16)

## 2025-03-30 PROCEDURE — 94644 CONT INHLJ TX 1ST HOUR: CPT

## 2025-03-30 PROCEDURE — 96366 THER/PROPH/DIAG IV INF ADDON: CPT

## 2025-03-30 PROCEDURE — 96365 THER/PROPH/DIAG IV INF INIT: CPT

## 2025-03-30 PROCEDURE — 80053 COMPREHEN METABOLIC PANEL: CPT | Performed by: EMERGENCY MEDICINE

## 2025-03-30 PROCEDURE — 93005 ELECTROCARDIOGRAM TRACING: CPT

## 2025-03-30 PROCEDURE — 99285 EMERGENCY DEPT VISIT HI MDM: CPT | Performed by: EMERGENCY MEDICINE

## 2025-03-30 PROCEDURE — 87804 INFLUENZA ASSAY W/OPTIC: CPT | Performed by: EMERGENCY MEDICINE

## 2025-03-30 PROCEDURE — 85025 COMPLETE CBC W/AUTO DIFF WBC: CPT | Performed by: EMERGENCY MEDICINE

## 2025-03-30 PROCEDURE — 87811 SARS-COV-2 COVID19 W/OPTIC: CPT | Performed by: EMERGENCY MEDICINE

## 2025-03-30 PROCEDURE — 71045 X-RAY EXAM CHEST 1 VIEW: CPT

## 2025-03-30 PROCEDURE — 96375 TX/PRO/DX INJ NEW DRUG ADDON: CPT

## 2025-03-30 PROCEDURE — 99285 EMERGENCY DEPT VISIT HI MDM: CPT

## 2025-03-30 PROCEDURE — 36415 COLL VENOUS BLD VENIPUNCTURE: CPT | Performed by: EMERGENCY MEDICINE

## 2025-03-30 RX ORDER — ALBUTEROL SULFATE 5 MG/ML
10 SOLUTION RESPIRATORY (INHALATION) ONCE
Status: COMPLETED | OUTPATIENT
Start: 2025-03-30 | End: 2025-03-30

## 2025-03-30 RX ORDER — SODIUM CHLORIDE FOR INHALATION 0.9 %
12 VIAL, NEBULIZER (ML) INHALATION ONCE
Status: COMPLETED | OUTPATIENT
Start: 2025-03-30 | End: 2025-03-30

## 2025-03-30 RX ORDER — PREDNISONE 20 MG/1
50 TABLET ORAL DAILY
Qty: 10 TABLET | Refills: 0 | Status: SHIPPED | OUTPATIENT
Start: 2025-03-30 | End: 2025-04-03

## 2025-03-30 RX ORDER — ALBUTEROL SULFATE 0.83 MG/ML
2.5 SOLUTION RESPIRATORY (INHALATION) EVERY 6 HOURS PRN
Qty: 75 ML | Refills: 0 | Status: SHIPPED | OUTPATIENT
Start: 2025-03-30

## 2025-03-30 RX ORDER — METHYLPREDNISOLONE SODIUM SUCCINATE 125 MG/2ML
80 INJECTION, POWDER, LYOPHILIZED, FOR SOLUTION INTRAMUSCULAR; INTRAVENOUS ONCE
Status: COMPLETED | OUTPATIENT
Start: 2025-03-30 | End: 2025-03-30

## 2025-03-30 RX ORDER — AZITHROMYCIN 250 MG/1
TABLET, FILM COATED ORAL
Qty: 6 TABLET | Refills: 0 | Status: SHIPPED | OUTPATIENT
Start: 2025-03-30 | End: 2025-04-03

## 2025-03-30 RX ORDER — MAGNESIUM SULFATE HEPTAHYDRATE 40 MG/ML
2 INJECTION, SOLUTION INTRAVENOUS ONCE
Status: COMPLETED | OUTPATIENT
Start: 2025-03-30 | End: 2025-03-30

## 2025-03-30 RX ORDER — GUAIFENESIN 1200 MG/1
1200 TABLET, EXTENDED RELEASE ORAL EVERY 12 HOURS SCHEDULED
Qty: 30 TABLET | Refills: 0 | Status: SHIPPED | OUTPATIENT
Start: 2025-03-30

## 2025-03-30 RX ADMIN — IPRATROPIUM BROMIDE 1 MG: 0.5 SOLUTION RESPIRATORY (INHALATION) at 14:18

## 2025-03-30 RX ADMIN — ALBUTEROL SULFATE 10 MG: 2.5 SOLUTION RESPIRATORY (INHALATION) at 14:18

## 2025-03-30 RX ADMIN — ISODIUM CHLORIDE 12 ML: 0.03 SOLUTION RESPIRATORY (INHALATION) at 14:18

## 2025-03-30 RX ADMIN — SODIUM CHLORIDE 1000 ML: 0.9 INJECTION, SOLUTION INTRAVENOUS at 14:20

## 2025-03-30 RX ADMIN — MAGNESIUM SULFATE HEPTAHYDRATE 2 G: 40 INJECTION, SOLUTION INTRAVENOUS at 14:21

## 2025-03-30 RX ADMIN — METHYLPREDNISOLONE SODIUM SUCCINATE 80 MG: 125 INJECTION, POWDER, FOR SOLUTION INTRAMUSCULAR; INTRAVENOUS at 14:21

## 2025-03-30 NOTE — ED PROVIDER NOTES
Time reflects when diagnosis was documented in both MDM as applicable and the Disposition within this note       Time User Action Codes Description Comment    3/30/2025  4:01 PM Rosa M Olivo Add [R06.00] Dyspnea     3/30/2025  4:02 PM Rosa M Olivo Add [J45.901] Asthma exacerbation     3/30/2025  4:04 PM Rosa M Olivo Add [J40] Bronchitis           ED Disposition       ED Disposition   Discharge    Condition   Stable    Date/Time   Sun Mar 30, 2025  4:01 PM    Comment   Nahid Robertson discharge to home/self care.                   Assessment & Plan       Medical Decision Making  59-year-old male presents for evaluation of dyspnea, has history of COPD and asthma.  Symptoms been ongoing for the last few days and he has been using his albuterol inhaler without significant relief.  Will start patient on a heart neb as well as provide a dose of steroids and magnesium for likely asthma or COPD exacerbation.  Will reassess after breathing treatment need for additional treatments.  Will additionally obtain a EKG to assess for ischemic changes, he denies chest pain.  He has no lower extremity swelling denies history of VTE, doubt PE as source.  Can check basic labs for electrolyte abnormality, anemia as a potential source.    Amount and/or Complexity of Data Reviewed  Labs: ordered. Decision-making details documented in ED Course.  Radiology: ordered. Decision-making details documented in ED Course.    Risk  OTC drugs.  Prescription drug management.        ED Course as of 03/30/25 1757   Sun Mar 30, 2025   1424 Procedure Note: EKG  Date/Time: 03/30/25 2:24 PM   Interpreted by: Rosa M Olivo  Indications / Diagnosis: dyspnea  ECG reviewed by me, the ED Provider: yes   The EKG demonstrates:  Rhythm: normal sinus  Intervals: normal intervals  Axis: left axis  QRS/Blocks: normal QRS  ST Changes: No acute ST Changes, no STD/BRANDON. No significant changes from previous in MUSE.       1524 FLU/COVID Rapid Antigen (30  min. TAT) - Preferred screening test in ED  Negative for covid/flu   1524 Comprehensive metabolic panel(!)  Hyperglycemia with normal electrolytes, bicarb, and no AG--no evidence for DKA   1524 CBC and differential(!)  Unremarkable   1524 XR chest 1 view portable  On my interpretation, no acute cardiopulmonary disease   1534 Doing well, aeration improving   1559 Pt feels significantly improved and wants to go home. Will send home with nebulizer, send nebs rx, steroid burst, zpack to pharmacy for him       Medications   albuterol inhalation solution 10 mg (10 mg Nebulization Given 3/30/25 1418)   ipratropium (ATROVENT) 0.02 % inhalation solution 1 mg (1 mg Nebulization Given 3/30/25 1418)   sodium chloride 0.9 % inhalation solution 12 mL (12 mL Nebulization Given 3/30/25 1418)   methylPREDNISolone sodium succinate (Solu-MEDROL) injection 80 mg (80 mg Intravenous Given 3/30/25 1421)   magnesium sulfate 2 g/50 mL IVPB (premix) 2 g (0 g Intravenous Stopped 3/30/25 1602)   sodium chloride 0.9 % bolus 1,000 mL (0 mL Intravenous Stopped 3/30/25 1602)       ED Risk Strat Scores                            SBIRT 20yo+      Flowsheet Row Most Recent Value   Initial Alcohol Screen: US AUDIT-C     1. How often do you have a drink containing alcohol? 0 Filed at: 03/30/2025 1414   2. How many drinks containing alcohol do you have on a typical day you are drinking?  0 Filed at: 03/30/2025 1414   3a. Male UNDER 65: How often do you have five or more drinks on one occasion? 0 Filed at: 03/30/2025 1414   3b. FEMALE Any Age, or MALE 65+: How often do you have 4 or more drinks on one occassion? 0 Filed at: 03/30/2025 1414   Audit-C Score 0 Filed at: 03/30/2025 1414   MARCUS: How many times in the past year have you...    Used an illegal drug or used a prescription medication for non-medical reasons? Never Filed at: 03/30/2025 1414                            History of Present Illness       Chief Complaint   Patient presents with     Shortness of Breath     Patient reports increasing sob for the past four days. Using albuterol inhaler without any relief.        Past Medical History:   Diagnosis Date    FLORENCE (acute kidney injury) (HCC) 12/8/2017    Asthma     Cardiac murmur     COPD (chronic obstructive pulmonary disease) (HCC)     Diabetes mellitus (HCC)     Hyperlipidemia     Hypertension       Past Surgical History:   Procedure Laterality Date    APPENDECTOMY      teenager       Family History   Problem Relation Age of Onset    Hypertension Mother     Hypertension Father       Social History     Tobacco Use    Smoking status: Former     Current packs/day: 0.25     Average packs/day: 0.3 packs/day for 25.0 years (6.3 ttl pk-yrs)     Types: Cigarettes    Smokeless tobacco: Never   Vaping Use    Vaping status: Never Used   Substance Use Topics    Alcohol use: Yes     Comment: occassional    Drug use: No      E-Cigarette/Vaping    E-Cigarette Use Never User       E-Cigarette/Vaping Substances    Nicotine No     THC No     CBD No     Flavoring No     Other No     Unknown No       I have reviewed and agree with the history as documented.     59-year-old male presents for evaluation of dyspnea, asthma exacerbation.  Patient reports symptoms started developing over the last few days, has been using his inhaler without significant relief, does not have a nebulizer machine at home.  He admits to a cough with increased sputum production, sputum is yellow and green, denies hemoptysis.  He denies significant chest pain, fevers, GI upset.  He denies lower extremity swelling or history of VTE.        Review of Systems   Constitutional:  Negative for activity change, appetite change and fever.   Respiratory:  Positive for cough and shortness of breath.    Cardiovascular:  Negative for chest pain and leg swelling.   Gastrointestinal:  Negative for abdominal pain.   All other systems reviewed and are negative.          Objective       ED Triage Vitals [03/30/25  1412]   Temperature Pulse Blood Pressure Respirations SpO2 Patient Position - Orthostatic VS   98.7 °F (37.1 °C) 105 163/74 (!) 24 (!) 88 % Sitting      Temp Source Heart Rate Source BP Location FiO2 (%) Pain Score    Temporal Monitor Right arm -- No Pain      Vitals      Date and Time Temp Pulse SpO2 Resp BP Pain Score FACES Pain Rating User   03/30/25 1611 -- 94 91 % 22 122/71 No Pain -- PP   03/30/25 1600 -- 88 92 % 20 127/69 -- -- PP   03/30/25 1500 -- 90 94 % 20 136/84 -- -- PP   03/30/25 1414 -- -- 91 % -- -- -- -- PP   03/30/25 1412 98.7 °F (37.1 °C) 105 88 % 24 163/74 No Pain -- PP            Physical Exam  Vitals reviewed.   Constitutional:       General: He is in acute distress.      Appearance: He is well-developed.   HENT:      Head: Normocephalic and atraumatic.      Right Ear: External ear normal.      Left Ear: External ear normal.      Nose: Nose normal.   Eyes:      General: No scleral icterus.        Right eye: No discharge.         Left eye: No discharge.   Cardiovascular:      Rate and Rhythm: Regular rhythm. Tachycardia present.   Pulmonary:      Effort: Tachypnea present.      Breath sounds: Decreased breath sounds present.   Abdominal:      General: There is no distension.      Palpations: Abdomen is soft.      Tenderness: There is no abdominal tenderness.   Musculoskeletal:      Right lower leg: No edema.      Left lower leg: No edema.   Skin:     General: Skin is warm.      Coloration: Skin is not jaundiced or pale.   Neurological:      General: No focal deficit present.      Mental Status: He is alert.         Results Reviewed       Procedure Component Value Units Date/Time    Comprehensive metabolic panel [[id number]]  (Abnormal) Collected: 03/30/25 1431    Lab Status: Final result Specimen: Blood from Arm, Left Updated: 03/30/25 1456     Sodium 135 mmol/L      Potassium 3.6 mmol/L      Chloride 96 mmol/L      CO2 27 mmol/L      ANION GAP 12 mmol/L      BUN 15 mg/dL      Creatinine 1.00  mg/dL      Glucose 315 mg/dL      Calcium 9.4 mg/dL      AST 33 U/L      ALT 34 U/L      Alkaline Phosphatase 96 U/L      Total Protein 8.0 g/dL      Albumin 4.2 g/dL      Total Bilirubin 0.90 mg/dL      eGFR 82 ml/min/1.73sq m     Narrative:      National Kidney Disease Foundation guidelines for Chronic Kidney Disease (CKD):     Stage 1 with normal or high GFR (GFR > 90 mL/min/1.73 square meters)    Stage 2 Mild CKD (GFR = 60-89 mL/min/1.73 square meters)    Stage 3A Moderate CKD (GFR = 45-59 mL/min/1.73 square meters)    Stage 3B Moderate CKD (GFR = 30-44 mL/min/1.73 square meters)    Stage 4 Severe CKD (GFR = 15-29 mL/min/1.73 square meters)    Stage 5 End Stage CKD (GFR <15 mL/min/1.73 square meters)  Note: GFR calculation is accurate only with a steady state creatinine    FLU/COVID Rapid Antigen (30 min. TAT) - Preferred screening test in ED [[id number]]  (Normal) Collected: 03/30/25 1431    Lab Status: Final result Specimen: Nares from Nose Updated: 03/30/25 1452     SARS COV Rapid Antigen Negative     Influenza A Rapid Antigen Negative     Influenza B Rapid Antigen Negative    Narrative:      This test has been performed using the Quidel Dahlia 2 FLU+SARS Antigen test under the Emergency Use Authorization (EUA). This test has been validated by the  and verified by the performing laboratory. The Dahlia uses lateral flow immunofluorescent sandwich assay to detect SARS-COV, Influenza A and Influenza B Antigen.     The Quidel Dahlia 2 SARS Antigen test does not differentiate between SARS-CoV and SARS-CoV-2.     Negative results are presumptive and may be confirmed with a molecular assay, if necessary, for patient management. Negative results do not rule out SARS-CoV-2 or influenza infection and should not be used as the sole basis for treatment or patient management decisions. A negative test result may occur if the level of antigen in a sample is below the limit of detection of this test.     Positive  "results are indicative of the presence of viral antigens, but do not rule out bacterial infection or co-infection with other viruses.     All test results should be used as an adjunct to clinical observations and other information available to the provider.    FOR PEDIATRIC PATIENTS - copy/paste COVID Guidelines URL to browser: [email or web address]    CBC and differential [[id number]]  (Abnormal) Collected: 25 1431    Lab Status: Final result Specimen: Blood from Arm, Left Updated: 25 1436     WBC 9.52 Thousand/uL      RBC 4.96 Million/uL      Hemoglobin 14.0 g/dL      Hematocrit 43.6 %      MCV 88 fL      MCH 28.2 pg      MCHC 32.1 g/dL      RDW 14.6 %      MPV 9.3 fL      Platelets 275 Thousands/uL      nRBC 0 /100 WBCs      Segmented % 49 %      Immature Grans % 0 %      Lymphocytes % 35 %      Monocytes % 8 %      Eosinophils Relative 8 %      Basophils Relative 0 %      Absolute Neutrophils 4.70 Thousands/µL      Absolute Immature Grans 0.01 Thousand/uL      Absolute Lymphocytes 3.29 Thousands/µL      Absolute Monocytes 0.73 Thousand/µL      Eosinophils Absolute 0.77 Thousand/µL      Basophils Absolute 0.02 Thousands/µL             XR chest 1 view portable    (Results Pending)       Procedures    ED Medication and Procedure Management   Prior to Admission Medications   Prescriptions Last Dose Informant Patient Reported? Taking?   Evolocumab 140 MG/ML SOAJ  Self Yes No   Sig: Inject 140 mg under the skin every 14 (fourteen) days   Glucagon (Gvoke HypoPen 2-Pack) 1 MG/0.2ML SOAJ  Self Yes No   Sig: Inject 1 mg under the skin   Insulin Glargine Solostar (Lantus SoloStar) 100 UNIT/ML SOPN  Self Yes No   Sig: Inject 30 Units under the skin daily   Insulin Pen Needle (PEN NEEDLES 29GX1/2\") 29G X 12MM MISC  Self Yes No   Si Device by Does not apply route   Insulin Syringe-Needle U-100 31G X 1/4\" 0.5 ML MISC  Self Yes No   Si Syringe by Does not " apply route   Lancet Devices (LANCING DEVICE) MISC  Self Yes No   Sig: Inject 1 Device under the skin   NovoLOG 100 UNIT/ML injection  Self Yes No   Sig: TO BE USED VIA INSULIN PUMP-UP  UNITS PER DAY   Riboflavin (Vitamin B-2) 50 MG TABS  Self Yes No   Sig: Take 1 tablet by mouth daily   albuterol (PROVENTIL HFA,VENTOLIN HFA) 90 mcg/act inhaler  Self No No   Sig: Inhale 2 puffs every 4 (four) hours as needed for wheezing   albuterol (Ventolin HFA) 90 mcg/act inhaler   No No   Sig: Inhale 1-2 puffs every 6 (six) hours as needed for wheezing   ezetimibe (ZETIA) 10 mg tablet  Self Yes No   Sig: TAKE ONE TABLET BY MOUTH DAILY AT 9AM   famotidine (PEPCID) 20 mg tablet  Self No No   Sig: Take 1 tablet (20 mg total) by mouth 2 (two) times a day   insulin glulisine (APIDRA) 100 units/mL injection  Self Yes No   Sig: TO BE USED VIA INSULIN PUMP-- UP  UNITS/DAY,   lisinopril-hydrochlorothiazide (PRINZIDE,ZESTORETIC) 20-25 MG per tablet  Self Yes No   Sig: TAKE ONE TABLET BY MOUTH DAILY AT 9AM   naproxen (NAPROSYN) 500 mg tablet  Self Yes No   Sig: Take 500 mg by mouth   ondansetron (ZOFRAN) 4 mg tablet  Self No No   Sig: Take 1 tablet (4 mg total) by mouth every 8 (eight) hours as needed for vomiting or nausea   pantoprazole (PROTONIX) 40 mg tablet  Self No No   Sig: Take 1 tablet (40 mg total) by mouth daily for 30 doses   pregabalin (LYRICA) 50 mg capsule   No No   Sig: Take 1 capsule (50 mg total) by mouth daily   semaglutide, 0.25 or 0.5 mg/dose, (Ozempic, 0.25 or 0.5 MG/DOSE,) 2 mg/3 mL injection pen  Self Yes No   Sig: Inject 0.5 mg under the skin Once a week   sildenafil (VIAGRA) 100 mg tablet   Yes No   Sig: Take 100 mg by mouth   sucralfate (CARAFATE) 1 g tablet  Self No No   Sig: Take 1 tablet (1 g total) by mouth 4 (four) times a day for 7 days      Facility-Administered Medications: None     Discharge Medication List as of 3/30/2025  4:04 PM        START taking these medications    Details   albuterol  "(2.5 mg/3 mL) 0.083 % nebulizer solution Take 3 mL (2.5 mg total) by nebulization every 6 (six) hours as needed for wheezing or shortness of breath, Starting Sun 3/30/2025, Normal      azithromycin (ZITHROMAX) 250 mg tablet Take 2 tablets today then 1 tablet daily x 4 days, Normal      predniSONE 20 mg tablet Take 2.5 tablets (50 mg total) by mouth daily for 4 days, Starting Sun 3/30/2025, Until Thu 4/3/2025, Normal           CONTINUE these medications which have NOT CHANGED    Details   !! albuterol (PROVENTIL HFA,VENTOLIN HFA) 90 mcg/act inhaler Inhale 2 puffs every 4 (four) hours as needed for wheezing, Starting Mon 9/17/2018, Print      !! albuterol (Ventolin HFA) 90 mcg/act inhaler Inhale 1-2 puffs every 6 (six) hours as needed for wheezing, Starting Sun 3/9/2025, Normal      Evolocumab 140 MG/ML SOAJ Inject 140 mg under the skin every 14 (fourteen) days, Starting Wed 9/27/2023, Historical Med      ezetimibe (ZETIA) 10 mg tablet TAKE ONE TABLET BY MOUTH DAILY AT 9AM, Historical Med      famotidine (PEPCID) 20 mg tablet Take 1 tablet (20 mg total) by mouth 2 (two) times a day, Starting Tue 9/3/2024, Print      Glucagon (Gvoke HypoPen 2-Pack) 1 MG/0.2ML SOAJ Inject 1 mg under the skin, Starting Fri 6/28/2024, Historical Med      Insulin Glargine Solostar (Lantus SoloStar) 100 UNIT/ML SOPN Inject 30 Units under the skin daily, Starting Fri 6/28/2024, Historical Med      insulin glulisine (APIDRA) 100 units/mL injection TO BE USED VIA INSULIN PUMP-- UP  UNITS/DAY,, Historical Med      Insulin Pen Needle (PEN NEEDLES 29GX1/2\") 29G X 12MM MISC 1 Device by Does not apply route, Starting Thu 11/30/2017, Historical Med      Insulin Syringe-Needle U-100 31G X 1/4\" 0.5 ML MISC 1 Syringe by Does not apply route, Starting Mon 10/8/2018, Historical Med      Lancet Devices (LANCING DEVICE) MISC Inject 1 Device under the skin, Starting Mon 3/5/2018, Historical Med      lisinopril-hydrochlorothiazide (PRINZIDE,ZESTORETIC) " 20-25 MG per tablet TAKE ONE TABLET BY MOUTH DAILY AT 9AM, Historical Med      naproxen (NAPROSYN) 500 mg tablet Take 500 mg by mouth, Starting Mon 7/22/2024, Until Tue 7/22/2025 at 2359, Historical Med      NovoLOG 100 UNIT/ML injection TO BE USED VIA INSULIN PUMP-UP  UNITS PER DAY, Historical Med      ondansetron (ZOFRAN) 4 mg tablet Take 1 tablet (4 mg total) by mouth every 8 (eight) hours as needed for vomiting or nausea, Starting Tue 9/3/2024, Print      pantoprazole (PROTONIX) 40 mg tablet Take 1 tablet (40 mg total) by mouth daily for 30 doses, Starting Mon 9/9/2024, Until Tue 12/10/2024, Normal      pregabalin (LYRICA) 50 mg capsule Take 1 capsule (50 mg total) by mouth daily, Starting Tue 12/10/2024, Until Mon 3/10/2025, Normal      Riboflavin (Vitamin B-2) 50 MG TABS Take 1 tablet by mouth daily, Starting Tue 1/2/2024, Historical Med      semaglutide, 0.25 or 0.5 mg/dose, (Ozempic, 0.25 or 0.5 MG/DOSE,) 2 mg/3 mL injection pen Inject 0.5 mg under the skin Once a week, Starting Tue 1/2/2024, Historical Med      sildenafil (VIAGRA) 100 mg tablet Take 100 mg by mouth, Starting Mon 8/1/2022, Until Sat 2/24/2024 at 2359, Historical Med      sucralfate (CARAFATE) 1 g tablet Take 1 tablet (1 g total) by mouth 4 (four) times a day for 7 days, Starting Tue 9/3/2024, Until Tue 12/10/2024, Print       !! - Potential duplicate medications found. Please discuss with provider.        No discharge procedures on file.  ED SEPSIS DOCUMENTATION   Time reflects when diagnosis was documented in both MDM as applicable and the Disposition within this note       Time User Action Codes Description Comment    3/30/2025  4:01 PM Rosa M Olivo Add [R06.00] Dyspnea     3/30/2025  4:02 PM Rosa M Olivo Add [J45.901] Asthma exacerbation     3/30/2025  4:04 PM Rosa M Olivo Add [J40] Bronchitis                  Rosa M Olivo,   03/30/25 1752

## 2025-04-01 ENCOUNTER — OFFICE VISIT (OUTPATIENT)
Dept: PODIATRY | Facility: CLINIC | Age: 60
End: 2025-04-01
Payer: COMMERCIAL

## 2025-04-01 VITALS — BODY MASS INDEX: 27.44 KG/M2 | HEIGHT: 71 IN | WEIGHT: 196 LBS

## 2025-04-01 DIAGNOSIS — B35.1 ONYCHOMYCOSIS: ICD-10-CM

## 2025-04-01 DIAGNOSIS — M79.675 PAIN IN TOES OF BOTH FEET: ICD-10-CM

## 2025-04-01 DIAGNOSIS — E11.42 DIABETIC POLYNEUROPATHY ASSOCIATED WITH TYPE 2 DIABETES MELLITUS (HCC): Primary | ICD-10-CM

## 2025-04-01 DIAGNOSIS — M79.674 PAIN IN TOES OF BOTH FEET: ICD-10-CM

## 2025-04-01 LAB
ATRIAL RATE: 91 BPM
P AXIS: 81 DEGREES
PR INTERVAL: 164 MS
QRS AXIS: -75 DEGREES
QRSD INTERVAL: 96 MS
QT INTERVAL: 348 MS
QTC INTERVAL: 428 MS
T WAVE AXIS: 73 DEGREES
VENTRICULAR RATE: 91 BPM

## 2025-04-01 PROCEDURE — 11720 DEBRIDE NAIL 1-5: CPT | Performed by: PODIATRIST

## 2025-04-01 PROCEDURE — 93010 ELECTROCARDIOGRAM REPORT: CPT | Performed by: INTERNAL MEDICINE

## 2025-04-01 PROCEDURE — RECHECK: Performed by: PODIATRIST

## 2025-04-01 RX ORDER — GABAPENTIN 300 MG/1
CAPSULE ORAL
COMMUNITY

## 2025-04-01 RX ORDER — FLUTICASONE FUROATE, UMECLIDINIUM BROMIDE AND VILANTEROL TRIFENATATE 200; 62.5; 25 UG/1; UG/1; UG/1
1 POWDER RESPIRATORY (INHALATION) DAILY
COMMUNITY
Start: 2025-03-28

## 2025-04-01 NOTE — PROGRESS NOTES
Name: Nahid Robertson      : 1965      MRN: [id number]  Encounter Provider: Regis Kirk DPM  Encounter Date: 2025   Encounter department: [facility] [place]  :  Assessment & Plan  Diabetic polyneuropathy associated with type 2 diabetes mellitus (HCC)    Lab Results   Component Value Date    HGBA1C 9.2 (H) 2023          He is to continue the gabapentin as directed.  Onychomycosis       Debride mycotic nails and thin the nail plates x 4 with the use of a nail nipper manually and an electric Dremel bur was used to reduce the thickness of the nail beds and smoothed the distal aspect of the nails.   Pain in toes of both feet         Pt was instructed to use lotion once a day on both feet such as cerave, Cetaphil or similar thick style of lotion.   Discussed proper shoe gear, daily inspections of feet, and general foot health with patient. Patient has Q9  findings and is recommended for at risk foot care every 9-10 weeks.    Patients most recent complete clinical foot exam was on: 12/10/2024      Return in about 10 weeks (around 6/10/2025).     History of Present Illness   HPI  Nahid Robertson is a 59 y.o. male who presents with chief complaint of painful thick nails on both feet and for follow-up on his gabapentin for his peripheral neuropathy.  He is a diabetic who states his sugars are running around 170.  His last A1c was 9.2 as of 2023.  History obtained from: patient    Review of Systems  Medical History Reviewed by provider this encounter:     .  Current Outpatient Medications on File Prior to Visit   Medication Sig Dispense Refill    albuterol (2.5 mg/3 mL) 0.083 % nebulizer solution Take 3 mL (2.5 mg total) by nebulization every 6 (six) hours as needed for wheezing or shortness of breath 75 mL 0    albuterol (PROVENTIL HFA,VENTOLIN HFA) 90 mcg/act inhaler Inhale 2 puffs every 4 (four) hours as needed for wheezing 1 Inhaler 0    albuterol (Ventolin HFA) 90 mcg/act  "inhaler Inhale 1-2 puffs every 6 (six) hours as needed for wheezing 18 g 0    azithromycin (ZITHROMAX) 250 mg tablet Take 2 tablets today then 1 tablet daily x 4 days 6 tablet 0    Evolocumab 140 MG/ML SOAJ Inject 140 mg under the skin every 14 (fourteen) days      ezetimibe (ZETIA) 10 mg tablet TAKE ONE TABLET BY MOUTH DAILY AT 9AM      famotidine (PEPCID) 20 mg tablet Take 1 tablet (20 mg total) by mouth 2 (two) times a day 14 tablet 0    fluticasone-umeclidinium-vilanterol (Trelegy Ellipta) 200-62.5-25 mcg/actuation AEPB inhaler Inhale 1 puff daily      gabapentin (NEURONTIN) 300 mg capsule TAKE ONE CAPSULE (300 MG TOTAL) BY MOUTH THREE TIMES DAILY AT 9AM, 3PM AND 9PM      Glucagon (Gvoke HypoPen 2-Pack) 1 MG/0.2ML SOAJ Inject 1 mg under the skin      guaiFENesin 1200 MG TB12 Take 1 tablet (1,200 mg total) by mouth every 12 (twelve) hours 30 tablet 0    Insulin Glargine Solostar (Lantus SoloStar) 100 UNIT/ML SOPN Inject 30 Units under the skin daily      insulin glulisine (APIDRA) 100 units/mL injection TO BE USED VIA INSULIN PUMP-- UP  UNITS/DAY,      Insulin Pen Needle (PEN NEEDLES 29GX1/2\") 29G X 12MM MISC 1 Device by Does not apply route      Insulin Syringe-Needle U-100 31G X 1/4\" 0.5 ML MISC 1 Syringe by Does not apply route      Lancet Devices (LANCING DEVICE) MISC Inject 1 Device under the skin      lisinopril-hydrochlorothiazide (PRINZIDE,ZESTORETIC) 20-25 MG per tablet TAKE ONE TABLET BY MOUTH DAILY AT 9AM      naproxen (NAPROSYN) 500 mg tablet Take 500 mg by mouth      NovoLOG 100 UNIT/ML injection TO BE USED VIA INSULIN PUMP-UP  UNITS PER DAY      ondansetron (ZOFRAN) 4 mg tablet Take 1 tablet (4 mg total) by mouth every 8 (eight) hours as needed for vomiting or nausea 12 tablet 0    predniSONE 20 mg tablet Take 2.5 tablets (50 mg total) by mouth daily for 4 days 10 tablet 0    Riboflavin (Vitamin B-2) 50 MG TABS Take 1 tablet by mouth daily      semaglutide, 0.25 or 0.5 mg/dose, (Ozempic, " "0.25 or 0.5 MG/DOSE,) 2 mg/3 mL injection pen Inject 0.5 mg under the skin Once a week      pantoprazole (PROTONIX) 40 mg tablet Take 1 tablet (40 mg total) by mouth daily for 30 doses 30 tablet 0    pregabalin (LYRICA) 50 mg capsule Take 1 capsule (50 mg total) by mouth daily (Patient not taking: Reported on 4/1/2025) 30 capsule 2    sildenafil (VIAGRA) 100 mg tablet Take 100 mg by mouth      sucralfate (CARAFATE) 1 g tablet Take 1 tablet (1 g total) by mouth 4 (four) times a day for 7 days 28 tablet 0     No current facility-administered medications on file prior to visit.      Social History     Tobacco Use    Smoking status: Former     Current packs/day: 0.25     Average packs/day: 0.3 packs/day for 25.0 years (6.3 ttl pk-yrs)     Types: Cigarettes    Smokeless tobacco: Never   Vaping Use    Vaping status: Never Used   Substance and Sexual Activity    Alcohol use: Yes     Comment: occassional    Drug use: No    Sexual activity: Yes     Partners: Female        Objective   Ht 5' 11\" (1.803 m)   Wt 88.9 kg (196 lb)   BMI 27.34 kg/m²      Physical Exam  Vascular status is 1/4 DP PT negative digital hair, normal distal cooling, immediate capillary refill bilaterally.  Capillary refill is approximately 2 seconds.    Derm nails are brittle elongated hypertrophic white-yellow brown discoloration with subungual debris x 4.  There is an increased thickness in the nails of approximately 1 to 2 mm.  There is yellow flaky tissue present on the plantar aspect of both feet especially in the area of the heels.  No signs of any fissures or dried blood present within the tissue.  There is thinning to the skin and shininess to the skin noted bilaterally and no loss of turgor is also seen bilaterally.    Neuro light touch and 0.5 monofilament test was intact and equal bilaterally. The sites that were tested were the plantar aspect of the hallux, plantar aspect of the third and fourth toes, submet 3, and the plantar aspect of the " arch. Patient is experiencing numbing in his toes also cold feeling to both feet and severe stabbing present in the midfoot area.  Patient relates that the neuropathy he is experiencing has slightly improved with the gabapentin.    Administrative Statements   I have spent a total time of 15 minutes in caring for this patient on the day of the visit/encounter including Risks and benefits of tx options, Instructions for management, Patient and family education, Importance of tx compliance, Risk factor reductions, Counseling / Coordination of care, Documenting in the medical record, and Obtaining or reviewing history  .

## 2025-07-01 ENCOUNTER — PROCEDURE VISIT (OUTPATIENT)
Dept: PODIATRY | Facility: CLINIC | Age: 60
End: 2025-07-01
Payer: COMMERCIAL

## 2025-07-01 VITALS — BODY MASS INDEX: 26.32 KG/M2 | HEIGHT: 71 IN | WEIGHT: 188 LBS

## 2025-07-01 DIAGNOSIS — M79.674 PAIN IN TOES OF BOTH FEET: ICD-10-CM

## 2025-07-01 DIAGNOSIS — E11.42 DIABETIC POLYNEUROPATHY ASSOCIATED WITH TYPE 2 DIABETES MELLITUS (HCC): ICD-10-CM

## 2025-07-01 DIAGNOSIS — B35.1 ONYCHOMYCOSIS: Primary | ICD-10-CM

## 2025-07-01 DIAGNOSIS — M79.675 PAIN IN TOES OF BOTH FEET: ICD-10-CM

## 2025-07-01 PROCEDURE — 11721 DEBRIDE NAIL 6 OR MORE: CPT | Performed by: PODIATRIST

## 2025-07-01 RX ORDER — INSULIN LISPRO 100 [IU]/ML
INJECTION, SOLUTION INTRAVENOUS; SUBCUTANEOUS
COMMUNITY
Start: 2025-06-30

## 2025-07-01 RX ORDER — ATORVASTATIN CALCIUM 80 MG/1
TABLET, FILM COATED ORAL
COMMUNITY
Start: 2025-06-26

## 2025-07-01 RX ORDER — PREDNISONE 20 MG/1
TABLET ORAL
COMMUNITY
Start: 2025-03-28

## 2025-07-01 NOTE — PROGRESS NOTES
"Name: Nahid Robertson      : 1965      MRN: [id number]  Encounter Provider: Regis Kirk DPM  Encounter Date: 2025   Encounter department: [facility] [place]  :  Assessment & Plan  Onychomycosis       Debride mycotic nails and thin the nail plates x 6 with the use of a nail nipper manually and an electric Dremel bur was used to reduce the thickness of the nail beds and smoothed the distal aspect of the nails.   Diabetic polyneuropathy associated with type 2 diabetes mellitus (HCC)    Lab Results   Component Value Date    HGBA1C 9.2 (H) 2023            Pain in toes of both feet         Pt was instructed to use lotion once a day on both feet such as cerave, Cetaphil or similar thick style of lotion.   Discussed proper shoe gear, daily inspections of feet, and general foot health with patient. Patient has Q9  findings and is recommended for at risk foot care every 9-10 weeks.    Patients most recent complete clinical foot exam was on: 2025    Return in about 10 weeks (around 2025).     History of Present Illness   HPI  Nahid Robertson is a 59 y.o. male who presents with chief complaint of painful thick nails on both feet.  No change in his medical history or medications since his last visit.  He is a diabetic whose last A1c was 9.2 drawn on 2023.  Patient presents for at-risk foot care.  Patient has no acute concerns today.  Patient has significant lower extremity risk due to neuropathy, parasthesia, edema, and trophic skin changes to the lower extremity.   History obtained from: patient    Review of Systems  Medical History Reviewed by provider this encounter:     .  Medications Ordered Prior to Encounter[1]   Social History[2]     Objective   Ht 5' 11\" (1.803 m)   Wt 85.3 kg (188 lb)   BMI 26.22 kg/m²      Physical Exam  Vascular status is 1/4 DP PT negative digital hair, normal distal cooling, immediate capillary refill bilaterally.  Capillary refill is " approximately 2 seconds.     Derm nails are brittle elongated hypertrophic white-yellow brown discoloration with subungual debris x 6.  There is an increased thickness in the nails of approximately 1 to 2 mm.  There is yellow flaky tissue present on the plantar aspect of both feet especially in the area of the heels.  No signs of any fissures or dried blood present within the tissue.  There is thinning to the skin and shininess to the skin noted bilaterally and no loss of turgor is also seen bilaterally.    Ortho and neuro are unchanged from his last visit on 4 1       [1]   Current Outpatient Medications on File Prior to Visit   Medication Sig Dispense Refill    albuterol (2.5 mg/3 mL) 0.083 % nebulizer solution Take 3 mL (2.5 mg total) by nebulization every 6 (six) hours as needed for wheezing or shortness of breath 75 mL 0    albuterol (PROVENTIL HFA,VENTOLIN HFA) 90 mcg/act inhaler Inhale 2 puffs every 4 (four) hours as needed for wheezing 1 Inhaler 0    albuterol (Ventolin HFA) 90 mcg/act inhaler Inhale 1-2 puffs every 6 (six) hours as needed for wheezing 18 g 0    atorvastatin (LIPITOR) 80 mg tablet       Evolocumab 140 MG/ML SOAJ Inject 140 mg under the skin every 14 (fourteen) days      ezetimibe (ZETIA) 10 mg tablet       famotidine (PEPCID) 20 mg tablet Take 1 tablet (20 mg total) by mouth 2 (two) times a day 14 tablet 0    fluticasone-umeclidinium-vilanterol (Trelegy Ellipta) 200-62.5-25 mcg/actuation AEPB inhaler Inhale 1 puff in the morning.      gabapentin (NEURONTIN) 300 mg capsule       Glucagon (Gvoke HypoPen 2-Pack) 1 MG/0.2ML SOAJ Inject 1 mg under the skin      guaiFENesin 1200 MG TB12 Take 1 tablet (1,200 mg total) by mouth every 12 (twelve) hours 30 tablet 0    Insulin Glargine Solostar (Lantus SoloStar) 100 UNIT/ML SOPN Inject 30 Units under the skin in the morning.      insulin glulisine (APIDRA) 100 units/mL injection       insulin lispro (HumALOG/ADMELOG) 100 units/mL injection To be used  "via pump-- up to 100 units/day--- or manually injected at ratio of 1 unit for 4 grams of carb and 1 unit for 20mg/dL lowering lowering to target glucose of 150mg/dL when off pump.      Insulin Pen Needle (PEN NEEDLES 29GX1/2\") 29G X 12MM MISC Use 1 Device      Insulin Syringe-Needle U-100 31G X 1/4\" 0.5 ML MISC Use 1 Syringe      Lancet Devices (LANCING DEVICE) MISC Inject 1 Device under the skin      lisinopril-hydrochlorothiazide (PRINZIDE,ZESTORETIC) 20-25 MG per tablet       naproxen (NAPROSYN) 500 mg tablet Take 500 mg by mouth      NovoLOG 100 UNIT/ML injection       ondansetron (ZOFRAN) 4 mg tablet Take 1 tablet (4 mg total) by mouth every 8 (eight) hours as needed for vomiting or nausea 12 tablet 0    predniSONE 20 mg tablet TAKE 3 TABS QD X 4 DAYS THEN 2 TABS QD X 3 DAYS THEN 1 TAB QD X 3 DAYS THEN 1/2 TAB QD X 3 DAYS (Patient taking differently: AS NEEDED FOR ASTHMA FLARE UPS)      Riboflavin (Vitamin B-2) 50 MG TABS Take 1 tablet by mouth in the morning.      semaglutide, 0.25 or 0.5 mg/dose, (Ozempic, 0.25 or 0.5 MG/DOSE,) 2 mg/3 mL injection pen Inject 0.5 mg under the skin Once a week      pantoprazole (PROTONIX) 40 mg tablet Take 1 tablet (40 mg total) by mouth daily for 30 doses (Patient not taking: Reported on 7/1/2025) 30 tablet 0    pregabalin (LYRICA) 50 mg capsule Take 1 capsule (50 mg total) by mouth daily (Patient not taking: Reported on 7/1/2025) 30 capsule 2    sildenafil (VIAGRA) 100 mg tablet Take 100 mg by mouth (Patient not taking: Reported on 7/1/2025)      sucralfate (CARAFATE) 1 g tablet Take 1 tablet (1 g total) by mouth 4 (four) times a day for 7 days (Patient not taking: Reported on 7/1/2025) 28 tablet 0     No current facility-administered medications on file prior to visit.   [2]   Social History  Tobacco Use    Smoking status: Former     Current packs/day: 0.25     Average packs/day: 0.3 packs/day for 25.0 years (6.3 ttl pk-yrs)     Types: Cigarettes    Smokeless tobacco: Never "   Vaping Use    Vaping status: Never Used   Substance and Sexual Activity    Alcohol use: Yes     Comment: occassional    Drug use: No    Sexual activity: Yes     Partners: Female

## 2025-07-07 ENCOUNTER — TELEPHONE (OUTPATIENT)
Age: 60
End: 2025-07-07

## 2025-07-07 ENCOUNTER — PREP FOR PROCEDURE (OUTPATIENT)
Age: 60
End: 2025-07-07

## 2025-07-07 DIAGNOSIS — Z12.11 ENCOUNTER FOR SCREENING COLONOSCOPY: Primary | ICD-10-CM

## 2025-07-07 NOTE — TELEPHONE ENCOUNTER
Scheduled date of colonoscopy (as of today): 8/6/25    Physician performing colonoscopy:  Dr. Castro    Location of colonoscopy:  [facility]    Bowel prep reviewed with patient:     [email or web address]   KELLEE/EWELINA

## 2025-07-07 NOTE — TELEPHONE ENCOUNTER
07/07/25  Screened by: Yanelis Zheng    Referring Provider Severiano Grimaldo    Pre- Screening:     There is no height or weight on file to calculate BMI.  26.22  Has patient been referred for a routine screening Colonoscopy? yes  Is the patient between 45-75 years old? yes      Previous Colonoscopy yes   If yes:    Date: 201    Facility:     Reason:         Does the patient want to see a Gastroenterologist prior to their procedure OR are they having any GI symptoms? no    Has the patient been hospitalized or had abdominal surgery in the past 6 months? no    Does the patient use supplemental oxygen? no    Does the patient take Coumadin, Lovenox, Plavix, Elliquis, Xarelto, or other blood thinning medication? no    Has the patient had a stroke, cardiac event, or stent placed in the past year? no      If patient is between 45yrs - 49yrs, please advise patient that we will have to confirm benefits & coverage with their insurance company for a routine screening colonoscopy.

## 2025-07-26 ENCOUNTER — APPOINTMENT (OUTPATIENT)
Dept: CT IMAGING | Facility: HOSPITAL | Age: 60
DRG: 189 | End: 2025-07-26
Payer: COMMERCIAL

## 2025-07-26 ENCOUNTER — APPOINTMENT (EMERGENCY)
Dept: RADIOLOGY | Facility: HOSPITAL | Age: 60
DRG: 189 | End: 2025-07-26
Payer: COMMERCIAL

## 2025-07-26 ENCOUNTER — HOSPITAL ENCOUNTER (INPATIENT)
Facility: HOSPITAL | Age: 60
LOS: 1 days | Discharge: HOME | DRG: 189 | End: 2025-07-28
Attending: EMERGENCY MEDICINE | Admitting: HOSPITALIST
Payer: COMMERCIAL

## 2025-07-26 PROBLEM — J96.01 ACUTE RESPIRATORY FAILURE WITH HYPOXIA (HCC): Status: ACTIVE | Noted: 2025-07-26

## 2025-07-26 PROBLEM — Z79.4 TYPE 2 DIABETES MELLITUS WITH HYPERGLYCEMIA, WITH LONG-TERM CURRENT USE OF INSULIN (HCC): Status: ACTIVE | Noted: 2017-12-08

## 2025-07-26 PROBLEM — E11.65 TYPE 2 DIABETES MELLITUS WITH HYPERGLYCEMIA, WITH LONG-TERM CURRENT USE OF INSULIN (HCC): Status: ACTIVE | Noted: 2017-12-08

## 2025-07-26 PROBLEM — J45.901 ASTHMA WITH ACUTE EXACERBATION: Status: ACTIVE | Noted: 2025-07-26

## 2025-07-26 PROBLEM — I70.201 FEMORAL ARTERY STENOSIS, RIGHT (HCC): Status: ACTIVE | Noted: 2025-07-26

## 2025-07-27 PROBLEM — J40 BRONCHITIS: Status: ACTIVE | Noted: 2025-07-27

## 2025-08-06 ENCOUNTER — ANESTHESIA (OUTPATIENT)
Dept: PERIOP | Facility: HOSPITAL | Age: 60
End: 2025-08-06
Payer: COMMERCIAL

## 2025-08-06 ENCOUNTER — HOSPITAL ENCOUNTER (OUTPATIENT)
Dept: PERIOP | Facility: HOSPITAL | Age: 60
Setting detail: OUTPATIENT SURGERY
Discharge: HOME | End: 2025-08-06
Attending: STUDENT IN AN ORGANIZED HEALTH CARE EDUCATION/TRAINING PROGRAM
Payer: COMMERCIAL

## 2025-08-06 ENCOUNTER — ANESTHESIA EVENT (OUTPATIENT)
Dept: PERIOP | Facility: HOSPITAL | Age: 60
End: 2025-08-06
Payer: COMMERCIAL

## 2025-08-06 ENCOUNTER — PREP FOR PROCEDURE (OUTPATIENT)
Dept: GASTROENTEROLOGY | Facility: CLINIC | Age: 60
End: 2025-08-06

## 2025-08-06 ENCOUNTER — TELEPHONE (OUTPATIENT)
Age: 60
End: 2025-08-06

## 2025-08-06 ENCOUNTER — TELEPHONE (OUTPATIENT)
Dept: PULMONOLOGY | Facility: CLINIC | Age: 60
End: 2025-08-06

## 2025-08-06 DIAGNOSIS — Z86.0100 HISTORY OF COLON POLYPS: Primary | ICD-10-CM

## 2025-08-06 RX ORDER — SODIUM CHLORIDE, SODIUM LACTATE, POTASSIUM CHLORIDE, CALCIUM CHLORIDE 600; 310; 30; 20 MG/100ML; MG/100ML; MG/100ML; MG/100ML
125 INJECTION, SOLUTION INTRAVENOUS CONTINUOUS
OUTPATIENT
Start: 2025-08-06

## 2025-08-06 RX ORDER — PROPOFOL 10 MG/ML
INJECTION, EMULSION INTRAVENOUS AS NEEDED
Status: DISCONTINUED | OUTPATIENT
Start: 2025-08-06 | End: 2025-08-06

## 2025-08-06 RX ORDER — LIDOCAINE HYDROCHLORIDE 10 MG/ML
INJECTION, SOLUTION EPIDURAL; INFILTRATION; INTRACAUDAL; PERINEURAL AS NEEDED
Status: DISCONTINUED | OUTPATIENT
Start: 2025-08-06 | End: 2025-08-06

## 2025-08-06 RX ORDER — LIDOCAINE HYDROCHLORIDE 20 MG/ML
INJECTION, SOLUTION EPIDURAL; INFILTRATION; INTRACAUDAL; PERINEURAL AS NEEDED
Status: DISCONTINUED | OUTPATIENT
Start: 2025-08-06 | End: 2025-08-06

## 2025-08-06 RX ORDER — POLYETHYLENE GLYCOL 3350, SODIUM SULFATE ANHYDROUS, SODIUM BICARBONATE, SODIUM CHLORIDE, POTASSIUM CHLORIDE 236; 22.74; 6.74; 5.86; 2.97 G/4L; G/4L; G/4L; G/4L; G/4L
4000 POWDER, FOR SOLUTION ORAL ONCE
Qty: 4000 ML | Refills: 0 | Status: SHIPPED | OUTPATIENT
Start: 2025-08-06 | End: 2025-08-06

## 2025-08-06 RX ORDER — SODIUM CHLORIDE, SODIUM LACTATE, POTASSIUM CHLORIDE, CALCIUM CHLORIDE 600; 310; 30; 20 MG/100ML; MG/100ML; MG/100ML; MG/100ML
INJECTION, SOLUTION INTRAVENOUS CONTINUOUS PRN
Status: DISCONTINUED | OUTPATIENT
Start: 2025-08-06 | End: 2025-08-06

## 2025-08-06 RX ORDER — POLYETHYLENE GLYCOL 3350 17 G/17G
238 POWDER, FOR SOLUTION ORAL ONCE
Qty: 238 G | Refills: 0 | Status: SHIPPED | OUTPATIENT
Start: 2025-08-06 | End: 2025-08-06

## 2025-08-06 RX ADMIN — PROPOFOL 50 MG: 10 INJECTION, EMULSION INTRAVENOUS at 10:37

## 2025-08-06 RX ADMIN — PROPOFOL 150 MG: 10 INJECTION, EMULSION INTRAVENOUS at 10:35

## 2025-08-06 RX ADMIN — LIDOCAINE HYDROCHLORIDE 100 MG: 20 INJECTION, SOLUTION EPIDURAL; INFILTRATION; INTRACAUDAL at 10:35

## 2025-08-06 RX ADMIN — SODIUM CHLORIDE, SODIUM LACTATE, POTASSIUM CHLORIDE, AND CALCIUM CHLORIDE: .6; .31; .03; .02 INJECTION, SOLUTION INTRAVENOUS at 10:32
